# Patient Record
Sex: MALE | Race: OTHER | HISPANIC OR LATINO | Employment: OTHER | URBAN - METROPOLITAN AREA
[De-identification: names, ages, dates, MRNs, and addresses within clinical notes are randomized per-mention and may not be internally consistent; named-entity substitution may affect disease eponyms.]

---

## 2020-05-01 ENCOUNTER — HOSPITAL ENCOUNTER (INPATIENT)
Facility: HOSPITAL | Age: 70
LOS: 7 days | Discharge: HOME HEALTH | DRG: 871 | End: 2020-05-09
Attending: EMERGENCY MEDICINE | Admitting: FAMILY MEDICINE
Payer: MEDICARE

## 2020-05-01 DIAGNOSIS — R79.89 ELEVATED D-DIMER: ICD-10-CM

## 2020-05-01 DIAGNOSIS — J96.01 ACUTE HYPOXEMIC RESPIRATORY FAILURE (HCC): Primary | ICD-10-CM

## 2020-05-01 DIAGNOSIS — E87.1 HYPONATREMIA: ICD-10-CM

## 2020-05-01 DIAGNOSIS — D72.829 LEUKOCYTOSIS: ICD-10-CM

## 2020-05-01 DIAGNOSIS — K29.70 GASTRITIS: ICD-10-CM

## 2020-05-01 DIAGNOSIS — J12.82 PNEUMONIA DUE TO COVID-19 VIRUS: ICD-10-CM

## 2020-05-01 DIAGNOSIS — U07.1 PNEUMONIA DUE TO COVID-19 VIRUS: ICD-10-CM

## 2020-05-01 DIAGNOSIS — R94.31 PROLONGED Q-T INTERVAL ON ECG: ICD-10-CM

## 2020-05-01 DIAGNOSIS — I15.9 SECONDARY HYPERTENSION: ICD-10-CM

## 2020-05-01 DIAGNOSIS — J96.01 ACUTE RESPIRATORY FAILURE WITH HYPOXIA (HCC): ICD-10-CM

## 2020-05-01 DIAGNOSIS — R06.02 SHORTNESS OF BREATH: ICD-10-CM

## 2020-05-01 DIAGNOSIS — R79.82 ELEVATED C-REACTIVE PROTEIN (CRP): ICD-10-CM

## 2020-05-01 DIAGNOSIS — E87.2 LACTIC ACIDOSIS: ICD-10-CM

## 2020-05-01 DIAGNOSIS — E87.6 HYPOKALEMIA: ICD-10-CM

## 2020-05-01 PROCEDURE — 84145 PROCALCITONIN (PCT): CPT | Performed by: EMERGENCY MEDICINE

## 2020-05-01 PROCEDURE — 82550 ASSAY OF CK (CPK): CPT | Performed by: EMERGENCY MEDICINE

## 2020-05-01 PROCEDURE — 36415 COLL VENOUS BLD VENIPUNCTURE: CPT | Performed by: EMERGENCY MEDICINE

## 2020-05-01 PROCEDURE — 99285 EMERGENCY DEPT VISIT HI MDM: CPT

## 2020-05-01 PROCEDURE — 85007 BL SMEAR W/DIFF WBC COUNT: CPT | Performed by: EMERGENCY MEDICINE

## 2020-05-01 PROCEDURE — 87040 BLOOD CULTURE FOR BACTERIA: CPT | Performed by: EMERGENCY MEDICINE

## 2020-05-01 PROCEDURE — 85027 COMPLETE CBC AUTOMATED: CPT | Performed by: EMERGENCY MEDICINE

## 2020-05-01 PROCEDURE — 80053 COMPREHEN METABOLIC PANEL: CPT | Performed by: EMERGENCY MEDICINE

## 2020-05-01 PROCEDURE — 83520 IMMUNOASSAY QUANT NOS NONAB: CPT | Performed by: EMERGENCY MEDICINE

## 2020-05-01 PROCEDURE — 93005 ELECTROCARDIOGRAM TRACING: CPT

## 2020-05-01 PROCEDURE — 83605 ASSAY OF LACTIC ACID: CPT | Performed by: EMERGENCY MEDICINE

## 2020-05-01 PROCEDURE — 83735 ASSAY OF MAGNESIUM: CPT | Performed by: EMERGENCY MEDICINE

## 2020-05-01 PROCEDURE — 86140 C-REACTIVE PROTEIN: CPT | Performed by: EMERGENCY MEDICINE

## 2020-05-01 PROCEDURE — 85379 FIBRIN DEGRADATION QUANT: CPT | Performed by: EMERGENCY MEDICINE

## 2020-05-01 PROCEDURE — 82728 ASSAY OF FERRITIN: CPT | Performed by: EMERGENCY MEDICINE

## 2020-05-01 PROCEDURE — 83880 ASSAY OF NATRIURETIC PEPTIDE: CPT | Performed by: EMERGENCY MEDICINE

## 2020-05-01 PROCEDURE — 84484 ASSAY OF TROPONIN QUANT: CPT | Performed by: EMERGENCY MEDICINE

## 2020-05-01 PROCEDURE — 85610 PROTHROMBIN TIME: CPT | Performed by: EMERGENCY MEDICINE

## 2020-05-02 ENCOUNTER — APPOINTMENT (INPATIENT)
Dept: CT IMAGING | Facility: HOSPITAL | Age: 70
DRG: 871 | End: 2020-05-02
Payer: MEDICARE

## 2020-05-02 ENCOUNTER — APPOINTMENT (EMERGENCY)
Dept: RADIOLOGY | Facility: HOSPITAL | Age: 70
DRG: 871 | End: 2020-05-02
Attending: RADIOLOGY
Payer: MEDICARE

## 2020-05-02 PROBLEM — R06.02 SHORTNESS OF BREATH: Status: ACTIVE | Noted: 2020-05-02

## 2020-05-02 PROBLEM — Z20.822 SUSPECTED COVID-19 VIRUS INFECTION: Status: ACTIVE | Noted: 2020-05-02

## 2020-05-02 PROBLEM — J96.00 ACUTE RESPIRATORY FAILURE (HCC): Status: ACTIVE | Noted: 2020-05-02

## 2020-05-02 PROBLEM — A41.9 SEPSIS (HCC): Status: ACTIVE | Noted: 2020-05-02

## 2020-05-02 PROBLEM — I10 HYPERTENSION: Status: ACTIVE | Noted: 2020-05-02

## 2020-05-02 PROBLEM — R94.31 PROLONGED Q-T INTERVAL ON ECG: Status: ACTIVE | Noted: 2020-05-02

## 2020-05-02 LAB
ALBUMIN SERPL BCP-MCNC: 2.4 G/DL (ref 3.5–5)
ALBUMIN SERPL BCP-MCNC: 3 G/DL (ref 3.5–5)
ALP SERPL-CCNC: 72 U/L (ref 46–116)
ALP SERPL-CCNC: 76 U/L (ref 46–116)
ALT SERPL W P-5'-P-CCNC: 20 U/L (ref 12–78)
ALT SERPL W P-5'-P-CCNC: 26 U/L (ref 12–78)
ANION GAP SERPL CALCULATED.3IONS-SCNC: 10 MMOL/L (ref 4–13)
ANION GAP SERPL CALCULATED.3IONS-SCNC: 11 MMOL/L (ref 4–13)
AST SERPL W P-5'-P-CCNC: 35 U/L (ref 5–45)
AST SERPL W P-5'-P-CCNC: 38 U/L (ref 5–45)
ATRIAL RATE: 80 BPM
BASOPHILS # BLD MANUAL: 0 THOUSAND/UL (ref 0–0.1)
BASOPHILS NFR MAR MANUAL: 0 % (ref 0–1)
BILIRUB SERPL-MCNC: 0.5 MG/DL (ref 0.2–1)
BILIRUB SERPL-MCNC: 0.7 MG/DL (ref 0.2–1)
BUN SERPL-MCNC: 19 MG/DL (ref 5–25)
BUN SERPL-MCNC: 21 MG/DL (ref 5–25)
CALCIUM SERPL-MCNC: 8.7 MG/DL (ref 8.3–10.1)
CALCIUM SERPL-MCNC: 9.4 MG/DL (ref 8.3–10.1)
CHLORIDE SERPL-SCNC: 97 MMOL/L (ref 100–108)
CHLORIDE SERPL-SCNC: 99 MMOL/L (ref 100–108)
CK SERPL-CCNC: 149 U/L (ref 39–308)
CO2 SERPL-SCNC: 25 MMOL/L (ref 21–32)
CO2 SERPL-SCNC: 27 MMOL/L (ref 21–32)
CREAT SERPL-MCNC: 1.02 MG/DL (ref 0.6–1.3)
CREAT SERPL-MCNC: 1.27 MG/DL (ref 0.6–1.3)
CRP SERPL QL: 132.1 MG/L
D DIMER PPP FEU-MCNC: 2.29 UG/ML FEU
EOSINOPHIL # BLD MANUAL: 0 THOUSAND/UL (ref 0–0.4)
EOSINOPHIL NFR BLD MANUAL: 0 % (ref 0–6)
ERYTHROCYTE [DISTWIDTH] IN BLOOD BY AUTOMATED COUNT: 13.6 % (ref 11.6–15.1)
FERRITIN SERPL-MCNC: 744 NG/ML (ref 8–388)
GFR SERPL CREATININE-BSD FRML MDRD: 56 ML/MIN/1.73SQ M
GFR SERPL CREATININE-BSD FRML MDRD: 74 ML/MIN/1.73SQ M
GLUCOSE SERPL-MCNC: 125 MG/DL (ref 65–140)
GLUCOSE SERPL-MCNC: 99 MG/DL (ref 65–140)
HCT VFR BLD AUTO: 44.5 % (ref 36.5–49.3)
HGB BLD-MCNC: 14.6 G/DL (ref 12–17)
INR PPP: 0.95 (ref 0.84–1.19)
L PNEUMO1 AG UR QL IA.RAPID: NEGATIVE
LACTATE SERPL-SCNC: 1.8 MMOL/L (ref 0.5–2)
LACTATE SERPL-SCNC: 2.2 MMOL/L (ref 0.5–2)
LG PLATELETS BLD QL SMEAR: PRESENT
LYMPHOCYTES # BLD AUTO: 0.87 THOUSAND/UL (ref 0.6–4.47)
LYMPHOCYTES # BLD AUTO: 7 % (ref 14–44)
MAGNESIUM SERPL-MCNC: 2.4 MG/DL (ref 1.6–2.6)
MCH RBC QN AUTO: 30.5 PG (ref 26.8–34.3)
MCHC RBC AUTO-ENTMCNC: 32.8 G/DL (ref 31.4–37.4)
MCV RBC AUTO: 93 FL (ref 82–98)
MONOCYTES # BLD AUTO: 1.25 THOUSAND/UL (ref 0–1.22)
MONOCYTES NFR BLD: 10 % (ref 4–12)
NEUTROPHILS # BLD MANUAL: 9.98 THOUSAND/UL (ref 1.85–7.62)
NEUTS SEG NFR BLD AUTO: 80 % (ref 43–75)
NRBC BLD AUTO-RTO: 0 /100 WBCS
NT-PROBNP SERPL-MCNC: 151 PG/ML
P AXIS: 63 DEGREES
PLATELET # BLD AUTO: 190 THOUSANDS/UL (ref 149–390)
PLATELET # BLD AUTO: 202 THOUSANDS/UL (ref 149–390)
PLATELET BLD QL SMEAR: ADEQUATE
PMV BLD AUTO: 13.1 FL (ref 8.9–12.7)
PMV BLD AUTO: 13.3 FL (ref 8.9–12.7)
POTASSIUM SERPL-SCNC: 3.2 MMOL/L (ref 3.5–5.3)
POTASSIUM SERPL-SCNC: 3.3 MMOL/L (ref 3.5–5.3)
PR INTERVAL: 146 MS
PROCALCITONIN SERPL-MCNC: 0.15 NG/ML
PROCALCITONIN SERPL-MCNC: 0.21 NG/ML
PROT SERPL-MCNC: 7.5 G/DL (ref 6.4–8.2)
PROT SERPL-MCNC: 8.6 G/DL (ref 6.4–8.2)
PROTHROMBIN TIME: 12.6 SECONDS (ref 11.6–14.5)
QRS AXIS: 16 DEGREES
QRSD INTERVAL: 92 MS
QT INTERVAL: 410 MS
QTC INTERVAL: 472 MS
RBC # BLD AUTO: 4.78 MILLION/UL (ref 3.88–5.62)
S PNEUM AG UR QL: NEGATIVE
SARS-COV-2 RNA RESP QL NAA+PROBE: NEGATIVE
SODIUM SERPL-SCNC: 134 MMOL/L (ref 136–145)
SODIUM SERPL-SCNC: 135 MMOL/L (ref 136–145)
T WAVE AXIS: 50 DEGREES
TOTAL CELLS COUNTED SPEC: 100
TROPONIN I SERPL-MCNC: <0.02 NG/ML
VARIANT LYMPHS # BLD AUTO: 3 %
VENTRICULAR RATE: 80 BPM
WBC # BLD AUTO: 12.47 THOUSAND/UL (ref 4.31–10.16)

## 2020-05-02 PROCEDURE — 83605 ASSAY OF LACTIC ACID: CPT | Performed by: PHYSICIAN ASSISTANT

## 2020-05-02 PROCEDURE — 93010 ELECTROCARDIOGRAM REPORT: CPT | Performed by: INTERNAL MEDICINE

## 2020-05-02 PROCEDURE — 96374 THER/PROPH/DIAG INJ IV PUSH: CPT

## 2020-05-02 PROCEDURE — 80053 COMPREHEN METABOLIC PANEL: CPT | Performed by: PHYSICIAN ASSISTANT

## 2020-05-02 PROCEDURE — 99223 1ST HOSP IP/OBS HIGH 75: CPT | Performed by: FAMILY MEDICINE

## 2020-05-02 PROCEDURE — 87449 NOS EACH ORGANISM AG IA: CPT | Performed by: PHYSICIAN ASSISTANT

## 2020-05-02 PROCEDURE — 71275 CT ANGIOGRAPHY CHEST: CPT

## 2020-05-02 PROCEDURE — 85049 AUTOMATED PLATELET COUNT: CPT | Performed by: FAMILY MEDICINE

## 2020-05-02 PROCEDURE — 99222 1ST HOSP IP/OBS MODERATE 55: CPT | Performed by: INTERNAL MEDICINE

## 2020-05-02 PROCEDURE — 87205 SMEAR GRAM STAIN: CPT | Performed by: PHYSICIAN ASSISTANT

## 2020-05-02 PROCEDURE — 87070 CULTURE OTHR SPECIMN AEROBIC: CPT | Performed by: PHYSICIAN ASSISTANT

## 2020-05-02 PROCEDURE — 99291 CRITICAL CARE FIRST HOUR: CPT | Performed by: EMERGENCY MEDICINE

## 2020-05-02 PROCEDURE — 87635 SARS-COV-2 COVID-19 AMP PRB: CPT | Performed by: EMERGENCY MEDICINE

## 2020-05-02 PROCEDURE — 84145 PROCALCITONIN (PCT): CPT | Performed by: FAMILY MEDICINE

## 2020-05-02 PROCEDURE — 93005 ELECTROCARDIOGRAM TRACING: CPT

## 2020-05-02 RX ORDER — SIMVASTATIN 10 MG
20 TABLET ORAL
COMMUNITY
End: 2020-06-10 | Stop reason: SDUPTHER

## 2020-05-02 RX ORDER — ASPIRIN 81 MG/1
81 TABLET, CHEWABLE ORAL DAILY
Status: CANCELLED | OUTPATIENT
Start: 2020-05-02

## 2020-05-02 RX ORDER — HYDROXYCHLOROQUINE SULFATE 200 MG/1
200 TABLET, FILM COATED ORAL EVERY 12 HOURS
Status: COMPLETED | OUTPATIENT
Start: 2020-05-03 | End: 2020-05-08

## 2020-05-02 RX ORDER — GUAIFENESIN 600 MG
600 TABLET, EXTENDED RELEASE 12 HR ORAL 2 TIMES DAILY
Status: DISCONTINUED | OUTPATIENT
Start: 2020-05-02 | End: 2020-05-09 | Stop reason: HOSPADM

## 2020-05-02 RX ORDER — HYDROXYCHLOROQUINE SULFATE 200 MG/1
800 TABLET, FILM COATED ORAL EVERY 24 HOURS
Status: COMPLETED | OUTPATIENT
Start: 2020-05-02 | End: 2020-05-02

## 2020-05-02 RX ORDER — POTASSIUM CHLORIDE 20 MEQ/1
40 TABLET, EXTENDED RELEASE ORAL ONCE
Status: COMPLETED | OUTPATIENT
Start: 2020-05-02 | End: 2020-05-02

## 2020-05-02 RX ORDER — ALLOPURINOL 300 MG/1
300 TABLET ORAL DAILY
Status: CANCELLED | OUTPATIENT
Start: 2020-05-02

## 2020-05-02 RX ORDER — ATORVASTATIN CALCIUM 40 MG/1
40 TABLET, FILM COATED ORAL
Status: DISCONTINUED | OUTPATIENT
Start: 2020-05-02 | End: 2020-05-09 | Stop reason: HOSPADM

## 2020-05-02 RX ORDER — ASPIRIN 81 MG/1
81 TABLET, CHEWABLE ORAL DAILY
Status: DISCONTINUED | OUTPATIENT
Start: 2020-05-02 | End: 2020-05-09 | Stop reason: HOSPADM

## 2020-05-02 RX ORDER — ZINC SULFATE 50(220)MG
220 CAPSULE ORAL DAILY
Status: COMPLETED | OUTPATIENT
Start: 2020-05-02 | End: 2020-05-08

## 2020-05-02 RX ORDER — ONDANSETRON 2 MG/ML
4 INJECTION INTRAMUSCULAR; INTRAVENOUS EVERY 6 HOURS PRN
Status: DISCONTINUED | OUTPATIENT
Start: 2020-05-02 | End: 2020-05-09 | Stop reason: HOSPADM

## 2020-05-02 RX ORDER — METOPROLOL SUCCINATE 50 MG/1
100 TABLET, EXTENDED RELEASE ORAL DAILY
COMMUNITY
End: 2020-06-19 | Stop reason: SDUPTHER

## 2020-05-02 RX ORDER — ALLOPURINOL 300 MG/1
300 TABLET ORAL DAILY
Status: DISCONTINUED | OUTPATIENT
Start: 2020-05-02 | End: 2020-05-09 | Stop reason: HOSPADM

## 2020-05-02 RX ORDER — INDOMETHACIN 25 MG/1
50 CAPSULE ORAL 2 TIMES DAILY WITH MEALS
COMMUNITY
End: 2020-05-09 | Stop reason: HOSPADM

## 2020-05-02 RX ORDER — METHYLPREDNISOLONE SODIUM SUCCINATE 125 MG/2ML
45 INJECTION, POWDER, LYOPHILIZED, FOR SOLUTION INTRAMUSCULAR; INTRAVENOUS DAILY
Status: DISCONTINUED | OUTPATIENT
Start: 2020-05-02 | End: 2020-05-02

## 2020-05-02 RX ORDER — MELATONIN
2000 DAILY
Status: DISCONTINUED | OUTPATIENT
Start: 2020-05-02 | End: 2020-05-09 | Stop reason: HOSPADM

## 2020-05-02 RX ORDER — ASCORBIC ACID 500 MG
1000 TABLET ORAL EVERY 12 HOURS SCHEDULED
Status: COMPLETED | OUTPATIENT
Start: 2020-05-02 | End: 2020-05-08

## 2020-05-02 RX ORDER — INDOMETHACIN 25 MG/1
50 CAPSULE ORAL 2 TIMES DAILY WITH MEALS
Status: CANCELLED | OUTPATIENT
Start: 2020-05-02

## 2020-05-02 RX ORDER — METHYLPREDNISOLONE SODIUM SUCCINATE 125 MG/2ML
50 INJECTION, POWDER, LYOPHILIZED, FOR SOLUTION INTRAMUSCULAR; INTRAVENOUS DAILY
Status: DISCONTINUED | OUTPATIENT
Start: 2020-05-02 | End: 2020-05-03

## 2020-05-02 RX ORDER — MULTIVITAMIN/IRON/FOLIC ACID 18MG-0.4MG
1 TABLET ORAL DAILY
Status: DISCONTINUED | OUTPATIENT
Start: 2020-05-09 | End: 2020-05-09 | Stop reason: HOSPADM

## 2020-05-02 RX ORDER — DOXYCYCLINE HYCLATE 100 MG/1
100 CAPSULE ORAL EVERY 12 HOURS
Status: DISCONTINUED | OUTPATIENT
Start: 2020-05-02 | End: 2020-05-03

## 2020-05-02 RX ORDER — METOPROLOL SUCCINATE 100 MG/1
100 TABLET, EXTENDED RELEASE ORAL DAILY
Status: DISCONTINUED | OUTPATIENT
Start: 2020-05-02 | End: 2020-05-09 | Stop reason: HOSPADM

## 2020-05-02 RX ORDER — ACETAMINOPHEN 325 MG/1
650 TABLET ORAL EVERY 6 HOURS PRN
Status: DISCONTINUED | OUTPATIENT
Start: 2020-05-02 | End: 2020-05-09 | Stop reason: HOSPADM

## 2020-05-02 RX ORDER — DOCUSATE SODIUM 100 MG/1
100 CAPSULE, LIQUID FILLED ORAL 2 TIMES DAILY
Status: DISCONTINUED | OUTPATIENT
Start: 2020-05-02 | End: 2020-05-09 | Stop reason: HOSPADM

## 2020-05-02 RX ORDER — OXYBUTYNIN CHLORIDE 5 MG/1
TABLET ORAL 3 TIMES DAILY
COMMUNITY
End: 2020-05-02

## 2020-05-02 RX ORDER — ALLOPURINOL 100 MG/1
300 TABLET ORAL DAILY
Status: ON HOLD | COMMUNITY

## 2020-05-02 RX ORDER — DOXYCYCLINE HYCLATE 100 MG/1
100 CAPSULE ORAL ONCE
Status: COMPLETED | OUTPATIENT
Start: 2020-05-02 | End: 2020-05-02

## 2020-05-02 RX ORDER — ASPIRIN 81 MG/1
81 TABLET, CHEWABLE ORAL DAILY
Status: ON HOLD | COMMUNITY

## 2020-05-02 RX ADMIN — POTASSIUM CHLORIDE 40 MEQ: 1500 TABLET, EXTENDED RELEASE ORAL at 13:05

## 2020-05-02 RX ADMIN — METOPROLOL SUCCINATE 100 MG: 100 TABLET, EXTENDED RELEASE ORAL at 09:56

## 2020-05-02 RX ADMIN — OXYCODONE HYDROCHLORIDE AND ACETAMINOPHEN 1000 MG: 500 TABLET ORAL at 21:06

## 2020-05-02 RX ADMIN — GUAIFENESIN 600 MG: 600 TABLET ORAL at 17:31

## 2020-05-02 RX ADMIN — SODIUM CHLORIDE 1000 ML: 0.9 INJECTION, SOLUTION INTRAVENOUS at 00:52

## 2020-05-02 RX ADMIN — DOCUSATE SODIUM 100 MG: 100 CAPSULE, LIQUID FILLED ORAL at 09:57

## 2020-05-02 RX ADMIN — GUAIFENESIN 600 MG: 600 TABLET ORAL at 09:56

## 2020-05-02 RX ADMIN — ZINC SULFATE 220 MG (50 MG) CAPSULE 220 MG: CAPSULE at 09:56

## 2020-05-02 RX ADMIN — HYDROXYCHLOROQUINE SULFATE 800 MG: 200 TABLET, FILM COATED ORAL at 03:36

## 2020-05-02 RX ADMIN — OXYCODONE HYDROCHLORIDE AND ACETAMINOPHEN 1000 MG: 500 TABLET ORAL at 09:57

## 2020-05-02 RX ADMIN — DOXYCYCLINE 100 MG: 100 CAPSULE ORAL at 13:05

## 2020-05-02 RX ADMIN — ASPIRIN 81 MG 81 MG: 81 TABLET ORAL at 09:57

## 2020-05-02 RX ADMIN — MELATONIN 2000 UNITS: at 09:57

## 2020-05-02 RX ADMIN — ATORVASTATIN CALCIUM 40 MG: 40 TABLET, FILM COATED ORAL at 21:06

## 2020-05-02 RX ADMIN — DOXYCYCLINE 100 MG: 100 CAPSULE ORAL at 01:14

## 2020-05-02 RX ADMIN — POTASSIUM CHLORIDE 40 MEQ: 1500 TABLET, EXTENDED RELEASE ORAL at 03:36

## 2020-05-02 RX ADMIN — ALLOPURINOL 300 MG: 300 TABLET ORAL at 09:56

## 2020-05-02 RX ADMIN — METHYLPREDNISOLONE SODIUM SUCCINATE 50 MG: 125 INJECTION, POWDER, FOR SOLUTION INTRAMUSCULAR; INTRAVENOUS at 09:56

## 2020-05-02 RX ADMIN — DOCUSATE SODIUM 100 MG: 100 CAPSULE, LIQUID FILLED ORAL at 17:31

## 2020-05-02 RX ADMIN — ENOXAPARIN SODIUM 40 MG: 40 INJECTION SUBCUTANEOUS at 09:56

## 2020-05-02 RX ADMIN — IOHEXOL 90 ML: 350 INJECTION, SOLUTION INTRAVENOUS at 03:32

## 2020-05-02 RX ADMIN — CEFTRIAXONE SODIUM 1000 MG: 10 INJECTION, POWDER, FOR SOLUTION INTRAVENOUS at 01:14

## 2020-05-03 LAB
ALBUMIN SERPL BCP-MCNC: 2.3 G/DL (ref 3.5–5)
ALP SERPL-CCNC: 67 U/L (ref 46–116)
ALT SERPL W P-5'-P-CCNC: 22 U/L (ref 12–78)
ANION GAP SERPL CALCULATED.3IONS-SCNC: 9 MMOL/L (ref 4–13)
AST SERPL W P-5'-P-CCNC: 21 U/L (ref 5–45)
ATRIAL RATE: 69 BPM
ATRIAL RATE: 71 BPM
BASOPHILS # BLD AUTO: 0.01 THOUSANDS/ΜL (ref 0–0.1)
BASOPHILS NFR BLD AUTO: 0 % (ref 0–1)
BILIRUB SERPL-MCNC: 0.5 MG/DL (ref 0.2–1)
BUN SERPL-MCNC: 13 MG/DL (ref 5–25)
CALCIUM SERPL-MCNC: 9 MG/DL (ref 8.3–10.1)
CHLORIDE SERPL-SCNC: 103 MMOL/L (ref 100–108)
CO2 SERPL-SCNC: 25 MMOL/L (ref 21–32)
CREAT SERPL-MCNC: 0.72 MG/DL (ref 0.6–1.3)
CRP SERPL QL: 114.8 MG/L
D DIMER PPP FEU-MCNC: 1.27 UG/ML FEU
EOSINOPHIL # BLD AUTO: 0 THOUSAND/ΜL (ref 0–0.61)
EOSINOPHIL NFR BLD AUTO: 0 % (ref 0–6)
ERYTHROCYTE [DISTWIDTH] IN BLOOD BY AUTOMATED COUNT: 13.6 % (ref 11.6–15.1)
FERRITIN SERPL-MCNC: 626 NG/ML (ref 8–388)
GFR SERPL CREATININE-BSD FRML MDRD: 94 ML/MIN/1.73SQ M
GLUCOSE SERPL-MCNC: 129 MG/DL (ref 65–140)
HCT VFR BLD AUTO: 39.5 % (ref 36.5–49.3)
HGB BLD-MCNC: 13.3 G/DL (ref 12–17)
IMM GRANULOCYTES # BLD AUTO: 0.16 THOUSAND/UL (ref 0–0.2)
IMM GRANULOCYTES NFR BLD AUTO: 1 % (ref 0–2)
LYMPHOCYTES # BLD AUTO: 1.08 THOUSANDS/ΜL (ref 0.6–4.47)
LYMPHOCYTES NFR BLD AUTO: 9 % (ref 14–44)
MCH RBC QN AUTO: 30.9 PG (ref 26.8–34.3)
MCHC RBC AUTO-ENTMCNC: 33.7 G/DL (ref 31.4–37.4)
MCV RBC AUTO: 92 FL (ref 82–98)
MONOCYTES # BLD AUTO: 0.89 THOUSAND/ΜL (ref 0.17–1.22)
MONOCYTES NFR BLD AUTO: 8 % (ref 4–12)
NEUTROPHILS # BLD AUTO: 9.45 THOUSANDS/ΜL (ref 1.85–7.62)
NEUTS SEG NFR BLD AUTO: 82 % (ref 43–75)
NRBC BLD AUTO-RTO: 0 /100 WBCS
NT-PROBNP SERPL-MCNC: 360 PG/ML
P AXIS: 58 DEGREES
P AXIS: 68 DEGREES
PLATELET # BLD AUTO: 226 THOUSANDS/UL (ref 149–390)
PMV BLD AUTO: 12.9 FL (ref 8.9–12.7)
POTASSIUM SERPL-SCNC: 4.1 MMOL/L (ref 3.5–5.3)
PR INTERVAL: 144 MS
PR INTERVAL: 144 MS
PROCALCITONIN SERPL-MCNC: 0.05 NG/ML
PROT SERPL-MCNC: 7.6 G/DL (ref 6.4–8.2)
QRS AXIS: 15 DEGREES
QRS AXIS: 16 DEGREES
QRSD INTERVAL: 102 MS
QRSD INTERVAL: 94 MS
QT INTERVAL: 432 MS
QT INTERVAL: 434 MS
QTC INTERVAL: 462 MS
QTC INTERVAL: 471 MS
RBC # BLD AUTO: 4.3 MILLION/UL (ref 3.88–5.62)
SARS-COV-2 RNA RESP QL NAA+PROBE: NEGATIVE
SODIUM SERPL-SCNC: 137 MMOL/L (ref 136–145)
T WAVE AXIS: 51 DEGREES
T WAVE AXIS: 55 DEGREES
TROPONIN I SERPL-MCNC: <0.02 NG/ML
VENTRICULAR RATE: 69 BPM
VENTRICULAR RATE: 71 BPM
WBC # BLD AUTO: 11.59 THOUSAND/UL (ref 4.31–10.16)

## 2020-05-03 PROCEDURE — 82728 ASSAY OF FERRITIN: CPT | Performed by: PHYSICIAN ASSISTANT

## 2020-05-03 PROCEDURE — 87635 SARS-COV-2 COVID-19 AMP PRB: CPT | Performed by: PHYSICIAN ASSISTANT

## 2020-05-03 PROCEDURE — 84145 PROCALCITONIN (PCT): CPT | Performed by: FAMILY MEDICINE

## 2020-05-03 PROCEDURE — 80053 COMPREHEN METABOLIC PANEL: CPT | Performed by: PHYSICIAN ASSISTANT

## 2020-05-03 PROCEDURE — 93010 ELECTROCARDIOGRAM REPORT: CPT | Performed by: INTERNAL MEDICINE

## 2020-05-03 PROCEDURE — 86140 C-REACTIVE PROTEIN: CPT | Performed by: PHYSICIAN ASSISTANT

## 2020-05-03 PROCEDURE — 83880 ASSAY OF NATRIURETIC PEPTIDE: CPT | Performed by: PHYSICIAN ASSISTANT

## 2020-05-03 PROCEDURE — 85025 COMPLETE CBC W/AUTO DIFF WBC: CPT | Performed by: PHYSICIAN ASSISTANT

## 2020-05-03 PROCEDURE — 93005 ELECTROCARDIOGRAM TRACING: CPT

## 2020-05-03 PROCEDURE — 84484 ASSAY OF TROPONIN QUANT: CPT | Performed by: PHYSICIAN ASSISTANT

## 2020-05-03 PROCEDURE — 99232 SBSQ HOSP IP/OBS MODERATE 35: CPT | Performed by: INTERNAL MEDICINE

## 2020-05-03 PROCEDURE — 99232 SBSQ HOSP IP/OBS MODERATE 35: CPT | Performed by: FAMILY MEDICINE

## 2020-05-03 PROCEDURE — 85379 FIBRIN DEGRADATION QUANT: CPT | Performed by: PHYSICIAN ASSISTANT

## 2020-05-03 RX ORDER — METHYLPREDNISOLONE SODIUM SUCCINATE 40 MG/ML
40 INJECTION, POWDER, LYOPHILIZED, FOR SOLUTION INTRAMUSCULAR; INTRAVENOUS EVERY 12 HOURS SCHEDULED
Status: DISCONTINUED | OUTPATIENT
Start: 2020-05-03 | End: 2020-05-07

## 2020-05-03 RX ADMIN — MELATONIN 2000 UNITS: at 09:36

## 2020-05-03 RX ADMIN — DOCUSATE SODIUM 100 MG: 100 CAPSULE, LIQUID FILLED ORAL at 09:36

## 2020-05-03 RX ADMIN — OXYCODONE HYDROCHLORIDE AND ACETAMINOPHEN 1000 MG: 500 TABLET ORAL at 21:19

## 2020-05-03 RX ADMIN — ASPIRIN 81 MG 81 MG: 81 TABLET ORAL at 09:36

## 2020-05-03 RX ADMIN — ZINC SULFATE 220 MG (50 MG) CAPSULE 220 MG: CAPSULE at 09:36

## 2020-05-03 RX ADMIN — CEFTRIAXONE SODIUM 1000 MG: 10 INJECTION, POWDER, FOR SOLUTION INTRAVENOUS at 01:02

## 2020-05-03 RX ADMIN — METOPROLOL SUCCINATE 100 MG: 100 TABLET, EXTENDED RELEASE ORAL at 09:36

## 2020-05-03 RX ADMIN — DOXYCYCLINE 100 MG: 100 CAPSULE ORAL at 01:01

## 2020-05-03 RX ADMIN — HYDROXYCHLOROQUINE SULFATE 200 MG: 200 TABLET, FILM COATED ORAL at 16:00

## 2020-05-03 RX ADMIN — METHYLPREDNISOLONE SODIUM SUCCINATE 50 MG: 125 INJECTION, POWDER, FOR SOLUTION INTRAMUSCULAR; INTRAVENOUS at 09:35

## 2020-05-03 RX ADMIN — ALLOPURINOL 300 MG: 300 TABLET ORAL at 09:36

## 2020-05-03 RX ADMIN — OXYCODONE HYDROCHLORIDE AND ACETAMINOPHEN 1000 MG: 500 TABLET ORAL at 09:36

## 2020-05-03 RX ADMIN — ATORVASTATIN CALCIUM 40 MG: 40 TABLET, FILM COATED ORAL at 21:19

## 2020-05-03 RX ADMIN — ENOXAPARIN SODIUM 40 MG: 40 INJECTION SUBCUTANEOUS at 09:35

## 2020-05-03 RX ADMIN — GUAIFENESIN 600 MG: 600 TABLET ORAL at 09:36

## 2020-05-03 RX ADMIN — HYDROXYCHLOROQUINE SULFATE 200 MG: 200 TABLET, FILM COATED ORAL at 02:05

## 2020-05-03 RX ADMIN — GUAIFENESIN 600 MG: 600 TABLET ORAL at 17:18

## 2020-05-03 RX ADMIN — METHYLPREDNISOLONE SODIUM SUCCINATE 40 MG: 40 INJECTION, POWDER, FOR SOLUTION INTRAMUSCULAR; INTRAVENOUS at 21:19

## 2020-05-03 RX ADMIN — DOCUSATE SODIUM 100 MG: 100 CAPSULE, LIQUID FILLED ORAL at 17:18

## 2020-05-04 LAB
ATRIAL RATE: 65 BPM
BACTERIA SPT RESP CULT: ABNORMAL
BACTERIA SPT RESP CULT: ABNORMAL
CRP SERPL QL: 46.1 MG/L
ERYTHROCYTE [DISTWIDTH] IN BLOOD BY AUTOMATED COUNT: 13.6 % (ref 11.6–15.1)
GRAM STN SPEC: ABNORMAL
HCT VFR BLD AUTO: 39 % (ref 36.5–49.3)
HGB BLD-MCNC: 13.1 G/DL (ref 12–17)
IL6 SERPL-MCNC: 87.1 PG/ML (ref 0–15.5)
MCH RBC QN AUTO: 30.8 PG (ref 26.8–34.3)
MCHC RBC AUTO-ENTMCNC: 33.6 G/DL (ref 31.4–37.4)
MCV RBC AUTO: 92 FL (ref 82–98)
P AXIS: 70 DEGREES
PLATELET # BLD AUTO: 273 THOUSANDS/UL (ref 149–390)
PMV BLD AUTO: 12.9 FL (ref 8.9–12.7)
PR INTERVAL: 152 MS
QRS AXIS: 26 DEGREES
QRSD INTERVAL: 96 MS
QT INTERVAL: 452 MS
QTC INTERVAL: 470 MS
RBC # BLD AUTO: 4.26 MILLION/UL (ref 3.88–5.62)
T WAVE AXIS: 32 DEGREES
VENTRICULAR RATE: 65 BPM
WBC # BLD AUTO: 12.26 THOUSAND/UL (ref 4.31–10.16)

## 2020-05-04 PROCEDURE — 99223 1ST HOSP IP/OBS HIGH 75: CPT | Performed by: INTERNAL MEDICINE

## 2020-05-04 PROCEDURE — 93010 ELECTROCARDIOGRAM REPORT: CPT | Performed by: INTERNAL MEDICINE

## 2020-05-04 PROCEDURE — 97163 PT EVAL HIGH COMPLEX 45 MIN: CPT

## 2020-05-04 PROCEDURE — 99232 SBSQ HOSP IP/OBS MODERATE 35: CPT | Performed by: INTERNAL MEDICINE

## 2020-05-04 PROCEDURE — 99232 SBSQ HOSP IP/OBS MODERATE 35: CPT | Performed by: FAMILY MEDICINE

## 2020-05-04 PROCEDURE — 85027 COMPLETE CBC AUTOMATED: CPT | Performed by: FAMILY MEDICINE

## 2020-05-04 PROCEDURE — 93005 ELECTROCARDIOGRAM TRACING: CPT

## 2020-05-04 PROCEDURE — 86140 C-REACTIVE PROTEIN: CPT | Performed by: FAMILY MEDICINE

## 2020-05-04 RX ORDER — LISINOPRIL 5 MG/1
5 TABLET ORAL DAILY
Status: DISCONTINUED | OUTPATIENT
Start: 2020-05-04 | End: 2020-05-07

## 2020-05-04 RX ADMIN — METOPROLOL SUCCINATE 100 MG: 100 TABLET, EXTENDED RELEASE ORAL at 09:25

## 2020-05-04 RX ADMIN — LISINOPRIL 5 MG: 5 TABLET ORAL at 17:28

## 2020-05-04 RX ADMIN — OXYCODONE HYDROCHLORIDE AND ACETAMINOPHEN 1000 MG: 500 TABLET ORAL at 20:37

## 2020-05-04 RX ADMIN — METHYLPREDNISOLONE SODIUM SUCCINATE 40 MG: 40 INJECTION, POWDER, FOR SOLUTION INTRAMUSCULAR; INTRAVENOUS at 09:25

## 2020-05-04 RX ADMIN — MELATONIN 2000 UNITS: at 09:26

## 2020-05-04 RX ADMIN — DOCUSATE SODIUM 100 MG: 100 CAPSULE, LIQUID FILLED ORAL at 09:26

## 2020-05-04 RX ADMIN — GUAIFENESIN 600 MG: 600 TABLET ORAL at 17:26

## 2020-05-04 RX ADMIN — OXYCODONE HYDROCHLORIDE AND ACETAMINOPHEN 1000 MG: 500 TABLET ORAL at 09:26

## 2020-05-04 RX ADMIN — ZINC SULFATE 220 MG (50 MG) CAPSULE 220 MG: CAPSULE at 09:26

## 2020-05-04 RX ADMIN — HYDROXYCHLOROQUINE SULFATE 200 MG: 200 TABLET, FILM COATED ORAL at 17:26

## 2020-05-04 RX ADMIN — ATORVASTATIN CALCIUM 40 MG: 40 TABLET, FILM COATED ORAL at 20:37

## 2020-05-04 RX ADMIN — HYDROXYCHLOROQUINE SULFATE 200 MG: 200 TABLET, FILM COATED ORAL at 03:11

## 2020-05-04 RX ADMIN — ENOXAPARIN SODIUM 40 MG: 40 INJECTION SUBCUTANEOUS at 09:25

## 2020-05-04 RX ADMIN — GUAIFENESIN 600 MG: 600 TABLET ORAL at 09:26

## 2020-05-04 RX ADMIN — METHYLPREDNISOLONE SODIUM SUCCINATE 40 MG: 40 INJECTION, POWDER, FOR SOLUTION INTRAMUSCULAR; INTRAVENOUS at 20:37

## 2020-05-04 RX ADMIN — DOCUSATE SODIUM 100 MG: 100 CAPSULE, LIQUID FILLED ORAL at 17:26

## 2020-05-04 RX ADMIN — ASPIRIN 81 MG 81 MG: 81 TABLET ORAL at 09:26

## 2020-05-04 RX ADMIN — ALLOPURINOL 300 MG: 300 TABLET ORAL at 09:26

## 2020-05-05 PROBLEM — A41.9 SEPSIS (HCC): Status: RESOLVED | Noted: 2020-05-02 | Resolved: 2020-05-05

## 2020-05-05 PROBLEM — J96.01 ACUTE RESPIRATORY FAILURE WITH HYPOXIA (HCC): Status: ACTIVE | Noted: 2020-05-02

## 2020-05-05 PROCEDURE — 99232 SBSQ HOSP IP/OBS MODERATE 35: CPT | Performed by: PHYSICIAN ASSISTANT

## 2020-05-05 PROCEDURE — 99233 SBSQ HOSP IP/OBS HIGH 50: CPT | Performed by: INTERNAL MEDICINE

## 2020-05-05 RX ADMIN — GUAIFENESIN 600 MG: 600 TABLET ORAL at 08:50

## 2020-05-05 RX ADMIN — METHYLPREDNISOLONE SODIUM SUCCINATE 40 MG: 40 INJECTION, POWDER, FOR SOLUTION INTRAMUSCULAR; INTRAVENOUS at 21:43

## 2020-05-05 RX ADMIN — ATORVASTATIN CALCIUM 40 MG: 40 TABLET, FILM COATED ORAL at 21:42

## 2020-05-05 RX ADMIN — DOCUSATE SODIUM 100 MG: 100 CAPSULE, LIQUID FILLED ORAL at 08:50

## 2020-05-05 RX ADMIN — ENOXAPARIN SODIUM 40 MG: 40 INJECTION SUBCUTANEOUS at 08:50

## 2020-05-05 RX ADMIN — ZINC SULFATE 220 MG (50 MG) CAPSULE 220 MG: CAPSULE at 08:49

## 2020-05-05 RX ADMIN — HYDROXYCHLOROQUINE SULFATE 200 MG: 200 TABLET, FILM COATED ORAL at 03:13

## 2020-05-05 RX ADMIN — MELATONIN 2000 UNITS: at 08:50

## 2020-05-05 RX ADMIN — HYDROXYCHLOROQUINE SULFATE 200 MG: 200 TABLET, FILM COATED ORAL at 15:55

## 2020-05-05 RX ADMIN — METOPROLOL SUCCINATE 100 MG: 100 TABLET, EXTENDED RELEASE ORAL at 08:50

## 2020-05-05 RX ADMIN — ASPIRIN 81 MG 81 MG: 81 TABLET ORAL at 08:50

## 2020-05-05 RX ADMIN — ENOXAPARIN SODIUM 90 MG: 100 INJECTION SUBCUTANEOUS at 21:41

## 2020-05-05 RX ADMIN — OXYCODONE HYDROCHLORIDE AND ACETAMINOPHEN 1000 MG: 500 TABLET ORAL at 21:41

## 2020-05-05 RX ADMIN — OXYCODONE HYDROCHLORIDE AND ACETAMINOPHEN 1000 MG: 500 TABLET ORAL at 08:49

## 2020-05-05 RX ADMIN — METHYLPREDNISOLONE SODIUM SUCCINATE 40 MG: 40 INJECTION, POWDER, FOR SOLUTION INTRAMUSCULAR; INTRAVENOUS at 08:50

## 2020-05-05 RX ADMIN — LISINOPRIL 5 MG: 5 TABLET ORAL at 08:50

## 2020-05-05 RX ADMIN — ALLOPURINOL 300 MG: 300 TABLET ORAL at 08:50

## 2020-05-06 LAB
ALBUMIN SERPL BCP-MCNC: 2.4 G/DL (ref 3.5–5)
ALP SERPL-CCNC: 63 U/L (ref 46–116)
ALT SERPL W P-5'-P-CCNC: 29 U/L (ref 12–78)
ANION GAP SERPL CALCULATED.3IONS-SCNC: 10 MMOL/L (ref 4–13)
AST SERPL W P-5'-P-CCNC: 18 U/L (ref 5–45)
BILIRUB SERPL-MCNC: 0.3 MG/DL (ref 0.2–1)
BUN SERPL-MCNC: 15 MG/DL (ref 5–25)
CALCIUM SERPL-MCNC: 9 MG/DL (ref 8.3–10.1)
CHLORIDE SERPL-SCNC: 106 MMOL/L (ref 100–108)
CO2 SERPL-SCNC: 25 MMOL/L (ref 21–32)
CREAT SERPL-MCNC: 0.7 MG/DL (ref 0.6–1.3)
D DIMER PPP FEU-MCNC: 0.67 UG/ML FEU
ERYTHROCYTE [DISTWIDTH] IN BLOOD BY AUTOMATED COUNT: 13.9 % (ref 11.6–15.1)
GFR SERPL CREATININE-BSD FRML MDRD: 95 ML/MIN/1.73SQ M
GLUCOSE SERPL-MCNC: 126 MG/DL (ref 65–140)
HCT VFR BLD AUTO: 43.6 % (ref 36.5–49.3)
HGB BLD-MCNC: 14.1 G/DL (ref 12–17)
MCH RBC QN AUTO: 30.1 PG (ref 26.8–34.3)
MCHC RBC AUTO-ENTMCNC: 32.3 G/DL (ref 31.4–37.4)
MCV RBC AUTO: 93 FL (ref 82–98)
PLATELET # BLD AUTO: 302 THOUSANDS/UL (ref 149–390)
PMV BLD AUTO: 12.5 FL (ref 8.9–12.7)
POTASSIUM SERPL-SCNC: 4.3 MMOL/L (ref 3.5–5.3)
PROT SERPL-MCNC: 7.2 G/DL (ref 6.4–8.2)
RBC # BLD AUTO: 4.69 MILLION/UL (ref 3.88–5.62)
SODIUM SERPL-SCNC: 141 MMOL/L (ref 136–145)
WBC # BLD AUTO: 12.54 THOUSAND/UL (ref 4.31–10.16)

## 2020-05-06 PROCEDURE — 99232 SBSQ HOSP IP/OBS MODERATE 35: CPT | Performed by: PHYSICIAN ASSISTANT

## 2020-05-06 PROCEDURE — 80053 COMPREHEN METABOLIC PANEL: CPT | Performed by: INTERNAL MEDICINE

## 2020-05-06 PROCEDURE — 99233 SBSQ HOSP IP/OBS HIGH 50: CPT | Performed by: INTERNAL MEDICINE

## 2020-05-06 PROCEDURE — 85027 COMPLETE CBC AUTOMATED: CPT | Performed by: INTERNAL MEDICINE

## 2020-05-06 PROCEDURE — 85379 FIBRIN DEGRADATION QUANT: CPT | Performed by: INTERNAL MEDICINE

## 2020-05-06 RX ADMIN — HYDROXYCHLOROQUINE SULFATE 200 MG: 200 TABLET, FILM COATED ORAL at 03:29

## 2020-05-06 RX ADMIN — GUAIFENESIN 600 MG: 600 TABLET ORAL at 09:53

## 2020-05-06 RX ADMIN — ZINC SULFATE 220 MG (50 MG) CAPSULE 220 MG: CAPSULE at 09:53

## 2020-05-06 RX ADMIN — GUAIFENESIN 600 MG: 600 TABLET ORAL at 17:13

## 2020-05-06 RX ADMIN — LISINOPRIL 5 MG: 5 TABLET ORAL at 09:54

## 2020-05-06 RX ADMIN — OXYCODONE HYDROCHLORIDE AND ACETAMINOPHEN 1000 MG: 500 TABLET ORAL at 22:08

## 2020-05-06 RX ADMIN — OXYCODONE HYDROCHLORIDE AND ACETAMINOPHEN 1000 MG: 500 TABLET ORAL at 09:53

## 2020-05-06 RX ADMIN — DOCUSATE SODIUM 100 MG: 100 CAPSULE, LIQUID FILLED ORAL at 09:53

## 2020-05-06 RX ADMIN — METHYLPREDNISOLONE SODIUM SUCCINATE 40 MG: 40 INJECTION, POWDER, FOR SOLUTION INTRAMUSCULAR; INTRAVENOUS at 09:53

## 2020-05-06 RX ADMIN — DOCUSATE SODIUM 100 MG: 100 CAPSULE, LIQUID FILLED ORAL at 17:13

## 2020-05-06 RX ADMIN — METHYLPREDNISOLONE SODIUM SUCCINATE 40 MG: 40 INJECTION, POWDER, FOR SOLUTION INTRAMUSCULAR; INTRAVENOUS at 22:14

## 2020-05-06 RX ADMIN — METOPROLOL SUCCINATE 100 MG: 100 TABLET, EXTENDED RELEASE ORAL at 09:54

## 2020-05-06 RX ADMIN — ATORVASTATIN CALCIUM 40 MG: 40 TABLET, FILM COATED ORAL at 22:14

## 2020-05-06 RX ADMIN — ENOXAPARIN SODIUM 90 MG: 100 INJECTION SUBCUTANEOUS at 22:14

## 2020-05-06 RX ADMIN — ASPIRIN 81 MG 81 MG: 81 TABLET ORAL at 09:54

## 2020-05-06 RX ADMIN — MELATONIN 2000 UNITS: at 09:54

## 2020-05-06 RX ADMIN — HYDROXYCHLOROQUINE SULFATE 200 MG: 200 TABLET, FILM COATED ORAL at 16:58

## 2020-05-06 RX ADMIN — ALLOPURINOL 300 MG: 300 TABLET ORAL at 09:53

## 2020-05-06 RX ADMIN — ENOXAPARIN SODIUM 90 MG: 100 INJECTION SUBCUTANEOUS at 09:53

## 2020-05-07 LAB
ALBUMIN SERPL BCP-MCNC: 2.5 G/DL (ref 3.5–5)
ALP SERPL-CCNC: 61 U/L (ref 46–116)
ALT SERPL W P-5'-P-CCNC: 29 U/L (ref 12–78)
ANION GAP SERPL CALCULATED.3IONS-SCNC: 9 MMOL/L (ref 4–13)
AST SERPL W P-5'-P-CCNC: 19 U/L (ref 5–45)
ATRIAL RATE: 55 BPM
BACTERIA BLD CULT: NORMAL
BACTERIA BLD CULT: NORMAL
BILIRUB SERPL-MCNC: 0.4 MG/DL (ref 0.2–1)
BUN SERPL-MCNC: 15 MG/DL (ref 5–25)
CALCIUM SERPL-MCNC: 8.7 MG/DL (ref 8.3–10.1)
CHLORIDE SERPL-SCNC: 103 MMOL/L (ref 100–108)
CO2 SERPL-SCNC: 27 MMOL/L (ref 21–32)
CREAT SERPL-MCNC: 0.69 MG/DL (ref 0.6–1.3)
ERYTHROCYTE [DISTWIDTH] IN BLOOD BY AUTOMATED COUNT: 13.8 % (ref 11.6–15.1)
GFR SERPL CREATININE-BSD FRML MDRD: 96 ML/MIN/1.73SQ M
GLUCOSE SERPL-MCNC: 136 MG/DL (ref 65–140)
HCT VFR BLD AUTO: 45.2 % (ref 36.5–49.3)
HGB BLD-MCNC: 14.8 G/DL (ref 12–17)
MCH RBC QN AUTO: 30.6 PG (ref 26.8–34.3)
MCHC RBC AUTO-ENTMCNC: 32.7 G/DL (ref 31.4–37.4)
MCV RBC AUTO: 93 FL (ref 82–98)
P AXIS: 62 DEGREES
PLATELET # BLD AUTO: 308 THOUSANDS/UL (ref 149–390)
PMV BLD AUTO: 12.1 FL (ref 8.9–12.7)
POTASSIUM SERPL-SCNC: 4.4 MMOL/L (ref 3.5–5.3)
PR INTERVAL: 146 MS
PROT SERPL-MCNC: 7 G/DL (ref 6.4–8.2)
QRS AXIS: 8 DEGREES
QRSD INTERVAL: 104 MS
QT INTERVAL: 456 MS
QTC INTERVAL: 436 MS
RBC # BLD AUTO: 4.84 MILLION/UL (ref 3.88–5.62)
SODIUM SERPL-SCNC: 139 MMOL/L (ref 136–145)
T WAVE AXIS: 37 DEGREES
VENTRICULAR RATE: 55 BPM
WBC # BLD AUTO: 13.85 THOUSAND/UL (ref 4.31–10.16)

## 2020-05-07 PROCEDURE — 99232 SBSQ HOSP IP/OBS MODERATE 35: CPT | Performed by: PHYSICIAN ASSISTANT

## 2020-05-07 PROCEDURE — 99233 SBSQ HOSP IP/OBS HIGH 50: CPT | Performed by: INTERNAL MEDICINE

## 2020-05-07 PROCEDURE — 80053 COMPREHEN METABOLIC PANEL: CPT | Performed by: INTERNAL MEDICINE

## 2020-05-07 PROCEDURE — 85027 COMPLETE CBC AUTOMATED: CPT | Performed by: INTERNAL MEDICINE

## 2020-05-07 PROCEDURE — 93010 ELECTROCARDIOGRAM REPORT: CPT | Performed by: INTERNAL MEDICINE

## 2020-05-07 PROCEDURE — 93005 ELECTROCARDIOGRAM TRACING: CPT

## 2020-05-07 RX ORDER — METHYLPREDNISOLONE SODIUM SUCCINATE 40 MG/ML
40 INJECTION, POWDER, LYOPHILIZED, FOR SOLUTION INTRAMUSCULAR; INTRAVENOUS DAILY
Status: DISCONTINUED | OUTPATIENT
Start: 2020-05-08 | End: 2020-05-09

## 2020-05-07 RX ORDER — LISINOPRIL 10 MG/1
10 TABLET ORAL DAILY
Status: DISCONTINUED | OUTPATIENT
Start: 2020-05-07 | End: 2020-05-09 | Stop reason: HOSPADM

## 2020-05-07 RX ADMIN — ALLOPURINOL 300 MG: 300 TABLET ORAL at 08:53

## 2020-05-07 RX ADMIN — ZINC SULFATE 220 MG (50 MG) CAPSULE 220 MG: CAPSULE at 08:53

## 2020-05-07 RX ADMIN — ATORVASTATIN CALCIUM 40 MG: 40 TABLET, FILM COATED ORAL at 23:21

## 2020-05-07 RX ADMIN — HYDROXYCHLOROQUINE SULFATE 200 MG: 200 TABLET, FILM COATED ORAL at 04:19

## 2020-05-07 RX ADMIN — ENOXAPARIN SODIUM 90 MG: 100 INJECTION SUBCUTANEOUS at 23:21

## 2020-05-07 RX ADMIN — ENOXAPARIN SODIUM 90 MG: 100 INJECTION SUBCUTANEOUS at 08:53

## 2020-05-07 RX ADMIN — METHYLPREDNISOLONE SODIUM SUCCINATE 40 MG: 40 INJECTION, POWDER, FOR SOLUTION INTRAMUSCULAR; INTRAVENOUS at 08:53

## 2020-05-07 RX ADMIN — DOCUSATE SODIUM 100 MG: 100 CAPSULE, LIQUID FILLED ORAL at 17:25

## 2020-05-07 RX ADMIN — METOPROLOL SUCCINATE 100 MG: 100 TABLET, EXTENDED RELEASE ORAL at 08:53

## 2020-05-07 RX ADMIN — GUAIFENESIN 600 MG: 600 TABLET ORAL at 08:53

## 2020-05-07 RX ADMIN — OXYCODONE HYDROCHLORIDE AND ACETAMINOPHEN 1000 MG: 500 TABLET ORAL at 08:53

## 2020-05-07 RX ADMIN — OXYCODONE HYDROCHLORIDE AND ACETAMINOPHEN 1000 MG: 500 TABLET ORAL at 23:20

## 2020-05-07 RX ADMIN — MELATONIN 2000 UNITS: at 08:53

## 2020-05-07 RX ADMIN — ASPIRIN 81 MG 81 MG: 81 TABLET ORAL at 08:53

## 2020-05-07 RX ADMIN — GUAIFENESIN 600 MG: 600 TABLET ORAL at 17:25

## 2020-05-07 RX ADMIN — HYDROXYCHLOROQUINE SULFATE 200 MG: 200 TABLET, FILM COATED ORAL at 17:25

## 2020-05-07 RX ADMIN — LISINOPRIL 10 MG: 10 TABLET ORAL at 08:53

## 2020-05-08 LAB
ALBUMIN SERPL BCP-MCNC: 2.4 G/DL (ref 3.5–5)
ALP SERPL-CCNC: 62 U/L (ref 46–116)
ALT SERPL W P-5'-P-CCNC: 49 U/L (ref 12–78)
ANION GAP SERPL CALCULATED.3IONS-SCNC: 10 MMOL/L (ref 4–13)
AST SERPL W P-5'-P-CCNC: 35 U/L (ref 5–45)
BILIRUB SERPL-MCNC: 0.4 MG/DL (ref 0.2–1)
BUN SERPL-MCNC: 18 MG/DL (ref 5–25)
CALCIUM SERPL-MCNC: 8.5 MG/DL (ref 8.3–10.1)
CHLORIDE SERPL-SCNC: 105 MMOL/L (ref 100–108)
CO2 SERPL-SCNC: 26 MMOL/L (ref 21–32)
CREAT SERPL-MCNC: 0.73 MG/DL (ref 0.6–1.3)
ERYTHROCYTE [DISTWIDTH] IN BLOOD BY AUTOMATED COUNT: 14.3 % (ref 11.6–15.1)
GFR SERPL CREATININE-BSD FRML MDRD: 93 ML/MIN/1.73SQ M
GLUCOSE SERPL-MCNC: 99 MG/DL (ref 65–140)
HCT VFR BLD AUTO: 47.2 % (ref 36.5–49.3)
HGB BLD-MCNC: 15.2 G/DL (ref 12–17)
MCH RBC QN AUTO: 30.9 PG (ref 26.8–34.3)
MCHC RBC AUTO-ENTMCNC: 32.2 G/DL (ref 31.4–37.4)
MCV RBC AUTO: 96 FL (ref 82–98)
PLATELET # BLD AUTO: 296 THOUSANDS/UL (ref 149–390)
PMV BLD AUTO: 12.6 FL (ref 8.9–12.7)
POTASSIUM SERPL-SCNC: 4.2 MMOL/L (ref 3.5–5.3)
PROT SERPL-MCNC: 6.7 G/DL (ref 6.4–8.2)
RBC # BLD AUTO: 4.92 MILLION/UL (ref 3.88–5.62)
SODIUM SERPL-SCNC: 141 MMOL/L (ref 136–145)
WBC # BLD AUTO: 14.81 THOUSAND/UL (ref 4.31–10.16)

## 2020-05-08 PROCEDURE — 80053 COMPREHEN METABOLIC PANEL: CPT | Performed by: INTERNAL MEDICINE

## 2020-05-08 PROCEDURE — 93005 ELECTROCARDIOGRAM TRACING: CPT

## 2020-05-08 PROCEDURE — 85027 COMPLETE CBC AUTOMATED: CPT | Performed by: INTERNAL MEDICINE

## 2020-05-08 PROCEDURE — 99232 SBSQ HOSP IP/OBS MODERATE 35: CPT | Performed by: INTERNAL MEDICINE

## 2020-05-08 PROCEDURE — 99232 SBSQ HOSP IP/OBS MODERATE 35: CPT | Performed by: PHYSICIAN ASSISTANT

## 2020-05-08 RX ADMIN — METOPROLOL SUCCINATE 100 MG: 100 TABLET, EXTENDED RELEASE ORAL at 08:43

## 2020-05-08 RX ADMIN — ENOXAPARIN SODIUM 40 MG: 100 INJECTION SUBCUTANEOUS at 08:44

## 2020-05-08 RX ADMIN — DOCUSATE SODIUM 100 MG: 100 CAPSULE, LIQUID FILLED ORAL at 08:42

## 2020-05-08 RX ADMIN — GUAIFENESIN 600 MG: 600 TABLET ORAL at 17:18

## 2020-05-08 RX ADMIN — ALLOPURINOL 300 MG: 300 TABLET ORAL at 08:43

## 2020-05-08 RX ADMIN — OXYCODONE HYDROCHLORIDE AND ACETAMINOPHEN 1000 MG: 500 TABLET ORAL at 20:10

## 2020-05-08 RX ADMIN — HYDROXYCHLOROQUINE SULFATE 200 MG: 200 TABLET, FILM COATED ORAL at 17:00

## 2020-05-08 RX ADMIN — MELATONIN 2000 UNITS: at 08:43

## 2020-05-08 RX ADMIN — ATORVASTATIN CALCIUM 40 MG: 40 TABLET, FILM COATED ORAL at 20:10

## 2020-05-08 RX ADMIN — DOCUSATE SODIUM 100 MG: 100 CAPSULE, LIQUID FILLED ORAL at 17:18

## 2020-05-08 RX ADMIN — OXYCODONE HYDROCHLORIDE AND ACETAMINOPHEN 1000 MG: 500 TABLET ORAL at 08:42

## 2020-05-08 RX ADMIN — METHYLPREDNISOLONE SODIUM SUCCINATE 40 MG: 40 INJECTION, POWDER, FOR SOLUTION INTRAMUSCULAR; INTRAVENOUS at 08:42

## 2020-05-08 RX ADMIN — HYDROXYCHLOROQUINE SULFATE 200 MG: 200 TABLET, FILM COATED ORAL at 05:11

## 2020-05-08 RX ADMIN — ENOXAPARIN SODIUM 30 MG: 30 INJECTION SUBCUTANEOUS at 20:10

## 2020-05-08 RX ADMIN — GUAIFENESIN 600 MG: 600 TABLET ORAL at 08:43

## 2020-05-08 RX ADMIN — ZINC SULFATE 220 MG (50 MG) CAPSULE 220 MG: CAPSULE at 08:43

## 2020-05-08 RX ADMIN — LISINOPRIL 10 MG: 10 TABLET ORAL at 08:46

## 2020-05-08 RX ADMIN — ASPIRIN 81 MG 81 MG: 81 TABLET ORAL at 08:42

## 2020-05-09 VITALS
HEIGHT: 70 IN | DIASTOLIC BLOOD PRESSURE: 68 MMHG | BODY MASS INDEX: 29.79 KG/M2 | SYSTOLIC BLOOD PRESSURE: 125 MMHG | HEART RATE: 57 BPM | RESPIRATION RATE: 18 BRPM | OXYGEN SATURATION: 97 % | WEIGHT: 208.11 LBS | TEMPERATURE: 98 F

## 2020-05-09 PROCEDURE — 99239 HOSP IP/OBS DSCHRG MGMT >30: CPT | Performed by: INTERNAL MEDICINE

## 2020-05-09 RX ORDER — GUAIFENESIN 600 MG
600 TABLET, EXTENDED RELEASE 12 HR ORAL 2 TIMES DAILY
Qty: 20 TABLET | Refills: 0 | Status: SHIPPED | OUTPATIENT
Start: 2020-05-09 | End: 2020-05-19

## 2020-05-09 RX ORDER — PANTOPRAZOLE SODIUM 40 MG/1
40 TABLET, DELAYED RELEASE ORAL DAILY
Qty: 30 TABLET | Refills: 0 | Status: ON HOLD | OUTPATIENT
Start: 2020-05-09 | End: 2020-06-08

## 2020-05-09 RX ORDER — PREDNISONE 20 MG/1
40 TABLET ORAL DAILY
Status: DISCONTINUED | OUTPATIENT
Start: 2020-05-10 | End: 2020-05-09 | Stop reason: HOSPADM

## 2020-05-09 RX ORDER — DIPHENOXYLATE HYDROCHLORIDE AND ATROPINE SULFATE 2.5; .025 MG/1; MG/1
1 TABLET ORAL DAILY
Qty: 30 TABLET | Refills: 0 | Status: ON HOLD | OUTPATIENT
Start: 2020-05-09

## 2020-05-09 RX ORDER — LISINOPRIL 10 MG/1
10 TABLET ORAL DAILY
Qty: 30 TABLET | Refills: 2 | Status: ON HOLD | OUTPATIENT
Start: 2020-05-10

## 2020-05-09 RX ORDER — PREDNISONE 20 MG/1
40 TABLET ORAL DAILY
Qty: 30 TABLET | Refills: 0 | Status: SHIPPED | OUTPATIENT
Start: 2020-05-10 | End: 2020-05-13

## 2020-05-09 RX ADMIN — MELATONIN 2000 UNITS: at 08:46

## 2020-05-09 RX ADMIN — DOCUSATE SODIUM 100 MG: 100 CAPSULE, LIQUID FILLED ORAL at 08:46

## 2020-05-09 RX ADMIN — GUAIFENESIN 600 MG: 600 TABLET ORAL at 08:46

## 2020-05-09 RX ADMIN — Medication 1 TABLET: at 08:46

## 2020-05-09 RX ADMIN — ASPIRIN 81 MG 81 MG: 81 TABLET ORAL at 08:46

## 2020-05-09 RX ADMIN — ENOXAPARIN SODIUM 30 MG: 30 INJECTION SUBCUTANEOUS at 08:45

## 2020-05-09 RX ADMIN — ALLOPURINOL 300 MG: 300 TABLET ORAL at 08:46

## 2020-05-09 RX ADMIN — METHYLPREDNISOLONE SODIUM SUCCINATE 40 MG: 40 INJECTION, POWDER, FOR SOLUTION INTRAMUSCULAR; INTRAVENOUS at 08:46

## 2020-05-09 RX ADMIN — LISINOPRIL 10 MG: 10 TABLET ORAL at 08:46

## 2020-05-09 RX ADMIN — METOPROLOL SUCCINATE 100 MG: 100 TABLET, EXTENDED RELEASE ORAL at 08:46

## 2020-05-11 LAB
ATRIAL RATE: 52 BPM
P AXIS: 57 DEGREES
PR INTERVAL: 150 MS
QRS AXIS: 9 DEGREES
QRSD INTERVAL: 108 MS
QT INTERVAL: 468 MS
QTC INTERVAL: 435 MS
T WAVE AXIS: 21 DEGREES
VENTRICULAR RATE: 52 BPM

## 2020-05-11 PROCEDURE — 93010 ELECTROCARDIOGRAM REPORT: CPT | Performed by: INTERNAL MEDICINE

## 2020-06-07 ENCOUNTER — APPOINTMENT (OUTPATIENT)
Dept: RADIOLOGY | Facility: CLINIC | Age: 70
End: 2020-06-07
Payer: MEDICARE

## 2020-06-07 ENCOUNTER — OFFICE VISIT (OUTPATIENT)
Dept: URGENT CARE | Facility: CLINIC | Age: 70
End: 2020-06-07
Payer: MEDICARE

## 2020-06-07 ENCOUNTER — TELEPHONE (OUTPATIENT)
Dept: URGENT CARE | Facility: CLINIC | Age: 70
End: 2020-06-07

## 2020-06-07 VITALS
RESPIRATION RATE: 20 BRPM | SYSTOLIC BLOOD PRESSURE: 160 MMHG | TEMPERATURE: 98.3 F | HEART RATE: 88 BPM | DIASTOLIC BLOOD PRESSURE: 84 MMHG | OXYGEN SATURATION: 96 %

## 2020-06-07 DIAGNOSIS — R05.9 COUGH: ICD-10-CM

## 2020-06-07 DIAGNOSIS — R05.9 COUGH: Primary | ICD-10-CM

## 2020-06-07 PROCEDURE — 99203 OFFICE O/P NEW LOW 30 MIN: CPT | Performed by: PREVENTIVE MEDICINE

## 2020-06-07 PROCEDURE — G0463 HOSPITAL OUTPT CLINIC VISIT: HCPCS | Performed by: PREVENTIVE MEDICINE

## 2020-06-07 PROCEDURE — U0003 INFECTIOUS AGENT DETECTION BY NUCLEIC ACID (DNA OR RNA); SEVERE ACUTE RESPIRATORY SYNDROME CORONAVIRUS 2 (SARS-COV-2) (CORONAVIRUS DISEASE [COVID-19]), AMPLIFIED PROBE TECHNIQUE, MAKING USE OF HIGH THROUGHPUT TECHNOLOGIES AS DESCRIBED BY CMS-2020-01-R: HCPCS | Performed by: PREVENTIVE MEDICINE

## 2020-06-07 PROCEDURE — 71046 X-RAY EXAM CHEST 2 VIEWS: CPT

## 2020-06-07 RX ORDER — BROMPHENIRAMINE MALEATE, PSEUDOEPHEDRINE HYDROCHLORIDE, AND DEXTROMETHORPHAN HYDROBROMIDE 2; 30; 10 MG/5ML; MG/5ML; MG/5ML
5 SYRUP ORAL 4 TIMES DAILY PRN
Qty: 120 ML | Refills: 0 | Status: SHIPPED | OUTPATIENT
Start: 2020-06-07 | End: 2020-06-10 | Stop reason: SDUPTHER

## 2020-06-07 RX ORDER — AZITHROMYCIN 250 MG/1
TABLET, FILM COATED ORAL
Qty: 6 TABLET | Refills: 0 | Status: SHIPPED | OUTPATIENT
Start: 2020-06-07 | End: 2020-06-11

## 2020-06-07 RX ORDER — METHYLPREDNISOLONE 4 MG/1
TABLET ORAL
Qty: 21 EACH | Refills: 0 | Status: SHIPPED | OUTPATIENT
Start: 2020-06-07 | End: 2020-06-19 | Stop reason: SDUPTHER

## 2020-06-08 ENCOUNTER — TELEPHONE (OUTPATIENT)
Dept: URGENT CARE | Facility: CLINIC | Age: 70
End: 2020-06-08

## 2020-06-09 ENCOUNTER — TELEPHONE (OUTPATIENT)
Dept: URGENT CARE | Facility: CLINIC | Age: 70
End: 2020-06-09

## 2020-06-09 LAB — SARS-COV-2 RNA SPEC QL NAA+PROBE: NOT DETECTED

## 2020-06-10 ENCOUNTER — TELEPHONE (OUTPATIENT)
Dept: URGENT CARE | Facility: CLINIC | Age: 70
End: 2020-06-10

## 2020-06-10 ENCOUNTER — OFFICE VISIT (OUTPATIENT)
Dept: FAMILY MEDICINE CLINIC | Facility: CLINIC | Age: 70
End: 2020-06-10
Payer: MEDICARE

## 2020-06-10 VITALS
HEART RATE: 78 BPM | HEIGHT: 70 IN | OXYGEN SATURATION: 96 % | WEIGHT: 215.6 LBS | RESPIRATION RATE: 13 BRPM | SYSTOLIC BLOOD PRESSURE: 110 MMHG | DIASTOLIC BLOOD PRESSURE: 70 MMHG | BODY MASS INDEX: 30.86 KG/M2

## 2020-06-10 DIAGNOSIS — J96.01 ACUTE RESPIRATORY FAILURE WITH HYPOXIA (HCC): ICD-10-CM

## 2020-06-10 DIAGNOSIS — Z12.11 COLON CANCER SCREENING: Primary | ICD-10-CM

## 2020-06-10 DIAGNOSIS — Z00.00 HEALTH CARE MAINTENANCE: ICD-10-CM

## 2020-06-10 DIAGNOSIS — R05.9 COUGH: ICD-10-CM

## 2020-06-10 DIAGNOSIS — I15.9 SECONDARY HYPERTENSION: ICD-10-CM

## 2020-06-10 DIAGNOSIS — Z11.59 NEED FOR HEPATITIS C SCREENING TEST: ICD-10-CM

## 2020-06-10 DIAGNOSIS — Z76.89 ENCOUNTER TO ESTABLISH CARE: ICD-10-CM

## 2020-06-10 DIAGNOSIS — E78.49 OTHER HYPERLIPIDEMIA: ICD-10-CM

## 2020-06-10 PROCEDURE — 1036F TOBACCO NON-USER: CPT | Performed by: NURSE PRACTITIONER

## 2020-06-10 PROCEDURE — 3008F BODY MASS INDEX DOCD: CPT | Performed by: NURSE PRACTITIONER

## 2020-06-10 PROCEDURE — 3074F SYST BP LT 130 MM HG: CPT | Performed by: NURSE PRACTITIONER

## 2020-06-10 PROCEDURE — 99204 OFFICE O/P NEW MOD 45 MIN: CPT | Performed by: NURSE PRACTITIONER

## 2020-06-10 PROCEDURE — 3078F DIAST BP <80 MM HG: CPT | Performed by: NURSE PRACTITIONER

## 2020-06-10 PROCEDURE — 1160F RVW MEDS BY RX/DR IN RCRD: CPT | Performed by: NURSE PRACTITIONER

## 2020-06-10 PROCEDURE — 1111F DSCHRG MED/CURRENT MED MERGE: CPT | Performed by: NURSE PRACTITIONER

## 2020-06-10 RX ORDER — OXYBUTYNIN CHLORIDE 5 MG/1
5 TABLET ORAL DAILY
Status: ON HOLD | COMMUNITY

## 2020-06-10 RX ORDER — TAMSULOSIN HYDROCHLORIDE 0.4 MG/1
0.4 CAPSULE ORAL
Status: ON HOLD | COMMUNITY

## 2020-06-10 RX ORDER — ALLOPURINOL 300 MG/1
300 TABLET ORAL DAILY
Status: ON HOLD | COMMUNITY

## 2020-06-10 RX ORDER — BROMPHENIRAMINE MALEATE, PSEUDOEPHEDRINE HYDROCHLORIDE, AND DEXTROMETHORPHAN HYDROBROMIDE 2; 30; 10 MG/5ML; MG/5ML; MG/5ML
5 SYRUP ORAL 4 TIMES DAILY PRN
Qty: 120 ML | Refills: 0 | Status: SHIPPED | OUTPATIENT
Start: 2020-06-10 | End: 2020-06-19 | Stop reason: SDUPTHER

## 2020-06-10 RX ORDER — SIMVASTATIN 10 MG
20 TABLET ORAL
Qty: 30 TABLET | Refills: 3 | Status: ON HOLD | OUTPATIENT
Start: 2020-06-10

## 2020-06-11 ENCOUNTER — APPOINTMENT (OUTPATIENT)
Dept: LAB | Facility: HOSPITAL | Age: 70
End: 2020-06-11
Payer: MEDICARE

## 2020-06-11 DIAGNOSIS — Z00.00 HEALTH CARE MAINTENANCE: ICD-10-CM

## 2020-06-11 DIAGNOSIS — I15.9 SECONDARY HYPERTENSION: ICD-10-CM

## 2020-06-11 DIAGNOSIS — Z11.59 NEED FOR HEPATITIS C SCREENING TEST: ICD-10-CM

## 2020-06-11 LAB
ALBUMIN SERPL BCP-MCNC: 3.4 G/DL (ref 3.5–5)
ALP SERPL-CCNC: 71 U/L (ref 46–116)
ALT SERPL W P-5'-P-CCNC: 48 U/L (ref 12–78)
ANION GAP SERPL CALCULATED.3IONS-SCNC: 8 MMOL/L (ref 4–13)
AST SERPL W P-5'-P-CCNC: 19 U/L (ref 5–45)
BASOPHILS # BLD MANUAL: 0 THOUSAND/UL (ref 0–0.1)
BASOPHILS NFR MAR MANUAL: 0 % (ref 0–1)
BILIRUB SERPL-MCNC: 0.4 MG/DL (ref 0.2–1)
BUN SERPL-MCNC: 15 MG/DL (ref 5–25)
CALCIUM SERPL-MCNC: 9.2 MG/DL (ref 8.3–10.1)
CHLORIDE SERPL-SCNC: 99 MMOL/L (ref 100–108)
CHOLEST SERPL-MCNC: 214 MG/DL (ref 50–200)
CO2 SERPL-SCNC: 30 MMOL/L (ref 21–32)
CREAT SERPL-MCNC: 0.63 MG/DL (ref 0.6–1.3)
CREAT UR-MCNC: 69.4 MG/DL
EOSINOPHIL # BLD MANUAL: 0 THOUSAND/UL (ref 0–0.4)
EOSINOPHIL NFR BLD MANUAL: 0 % (ref 0–6)
ERYTHROCYTE [DISTWIDTH] IN BLOOD BY AUTOMATED COUNT: 15.8 % (ref 11.6–15.1)
GFR SERPL CREATININE-BSD FRML MDRD: 99 ML/MIN/1.73SQ M
GLUCOSE P FAST SERPL-MCNC: 97 MG/DL (ref 65–99)
HCT VFR BLD AUTO: 44.7 % (ref 36.5–49.3)
HCV AB SER QL: NORMAL
HDLC SERPL-MCNC: 96 MG/DL
HGB BLD-MCNC: 14.4 G/DL (ref 12–17)
LDLC SERPL CALC-MCNC: 109 MG/DL (ref 0–100)
LYMPHOCYTES # BLD AUTO: 3.48 THOUSAND/UL (ref 0.6–4.47)
LYMPHOCYTES # BLD AUTO: 32 % (ref 14–44)
MCH RBC QN AUTO: 30.5 PG (ref 26.8–34.3)
MCHC RBC AUTO-ENTMCNC: 32.2 G/DL (ref 31.4–37.4)
MCV RBC AUTO: 95 FL (ref 82–98)
MICROALBUMIN UR-MCNC: 29 MG/L (ref 0–20)
MICROALBUMIN/CREAT 24H UR: 42 MG/G CREATININE (ref 0–30)
MONOCYTES # BLD AUTO: 0.87 THOUSAND/UL (ref 0–1.22)
MONOCYTES NFR BLD: 8 % (ref 4–12)
NEUTROPHILS # BLD MANUAL: 6.52 THOUSAND/UL (ref 1.85–7.62)
NEUTS BAND NFR BLD MANUAL: 1 % (ref 0–8)
NEUTS SEG NFR BLD AUTO: 59 % (ref 43–75)
NONHDLC SERPL-MCNC: 118 MG/DL
NRBC BLD AUTO-RTO: 0 /100 WBCS
PLATELET # BLD AUTO: 198 THOUSANDS/UL (ref 149–390)
PLATELET BLD QL SMEAR: ADEQUATE
PMV BLD AUTO: 12.1 FL (ref 8.9–12.7)
POTASSIUM SERPL-SCNC: 4.2 MMOL/L (ref 3.5–5.3)
PROT SERPL-MCNC: 7.7 G/DL (ref 6.4–8.2)
RBC # BLD AUTO: 4.72 MILLION/UL (ref 3.88–5.62)
SODIUM SERPL-SCNC: 137 MMOL/L (ref 136–145)
TOTAL CELLS COUNTED SPEC: 100
TRIGL SERPL-MCNC: 45 MG/DL
TSH SERPL DL<=0.05 MIU/L-ACNC: 1.45 UIU/ML (ref 0.36–3.74)
WBC # BLD AUTO: 10.87 THOUSAND/UL (ref 4.31–10.16)

## 2020-06-11 PROCEDURE — 82043 UR ALBUMIN QUANTITATIVE: CPT | Performed by: NURSE PRACTITIONER

## 2020-06-11 PROCEDURE — 85027 COMPLETE CBC AUTOMATED: CPT

## 2020-06-11 PROCEDURE — 84443 ASSAY THYROID STIM HORMONE: CPT

## 2020-06-11 PROCEDURE — 80053 COMPREHEN METABOLIC PANEL: CPT

## 2020-06-11 PROCEDURE — 80061 LIPID PANEL: CPT

## 2020-06-11 PROCEDURE — 86803 HEPATITIS C AB TEST: CPT

## 2020-06-11 PROCEDURE — 36415 COLL VENOUS BLD VENIPUNCTURE: CPT

## 2020-06-11 PROCEDURE — 85007 BL SMEAR W/DIFF WBC COUNT: CPT

## 2020-06-11 PROCEDURE — 82570 ASSAY OF URINE CREATININE: CPT | Performed by: NURSE PRACTITIONER

## 2020-06-18 ENCOUNTER — TELEPHONE (OUTPATIENT)
Dept: FAMILY MEDICINE CLINIC | Facility: CLINIC | Age: 70
End: 2020-06-18

## 2020-06-19 ENCOUNTER — OFFICE VISIT (OUTPATIENT)
Dept: FAMILY MEDICINE CLINIC | Facility: CLINIC | Age: 70
End: 2020-06-19
Payer: MEDICARE

## 2020-06-19 ENCOUNTER — HOSPITAL ENCOUNTER (OUTPATIENT)
Dept: RADIOLOGY | Facility: HOSPITAL | Age: 70
Discharge: HOME | End: 2020-06-19
Payer: MEDICARE

## 2020-06-19 VITALS
TEMPERATURE: 98.5 F | SYSTOLIC BLOOD PRESSURE: 132 MMHG | WEIGHT: 213.4 LBS | HEART RATE: 73 BPM | BODY MASS INDEX: 30.55 KG/M2 | DIASTOLIC BLOOD PRESSURE: 86 MMHG | HEIGHT: 70 IN | OXYGEN SATURATION: 97 %

## 2020-06-19 DIAGNOSIS — Z00.00 MEDICARE ANNUAL WELLNESS VISIT, SUBSEQUENT: ICD-10-CM

## 2020-06-19 DIAGNOSIS — R05.9 COUGH: ICD-10-CM

## 2020-06-19 DIAGNOSIS — Z12.2 ENCOUNTER FOR SCREENING FOR LUNG CANCER: ICD-10-CM

## 2020-06-19 DIAGNOSIS — J96.01 ACUTE RESPIRATORY FAILURE WITH HYPOXIA (HCC): ICD-10-CM

## 2020-06-19 DIAGNOSIS — I15.9 SECONDARY HYPERTENSION: ICD-10-CM

## 2020-06-19 DIAGNOSIS — I15.9 SECONDARY HYPERTENSION: Primary | ICD-10-CM

## 2020-06-19 PROCEDURE — G0439 PPPS, SUBSEQ VISIT: HCPCS | Performed by: NURSE PRACTITIONER

## 2020-06-19 PROCEDURE — 3079F DIAST BP 80-89 MM HG: CPT | Performed by: NURSE PRACTITIONER

## 2020-06-19 PROCEDURE — 1170F FXNL STATUS ASSESSED: CPT | Performed by: NURSE PRACTITIONER

## 2020-06-19 PROCEDURE — 1036F TOBACCO NON-USER: CPT | Performed by: NURSE PRACTITIONER

## 2020-06-19 PROCEDURE — 3075F SYST BP GE 130 - 139MM HG: CPT | Performed by: NURSE PRACTITIONER

## 2020-06-19 PROCEDURE — 1111F DSCHRG MED/CURRENT MED MERGE: CPT | Performed by: NURSE PRACTITIONER

## 2020-06-19 PROCEDURE — 1160F RVW MEDS BY RX/DR IN RCRD: CPT | Performed by: NURSE PRACTITIONER

## 2020-06-19 PROCEDURE — 71046 X-RAY EXAM CHEST 2 VIEWS: CPT

## 2020-06-19 PROCEDURE — 1125F AMNT PAIN NOTED PAIN PRSNT: CPT | Performed by: NURSE PRACTITIONER

## 2020-06-19 RX ORDER — METOPROLOL SUCCINATE 50 MG/1
100 TABLET, EXTENDED RELEASE ORAL DAILY
Qty: 10 TABLET | Refills: 0 | OUTPATIENT
Start: 2020-06-19

## 2020-06-19 RX ORDER — PROMETHAZINE HYDROCHLORIDE AND CODEINE PHOSPHATE 6.25; 1 MG/5ML; MG/5ML
5 SYRUP ORAL EVERY 4 HOURS PRN
Qty: 60 ML | Refills: 0 | Status: SHIPPED | OUTPATIENT
Start: 2020-06-19 | End: 2020-07-07 | Stop reason: SDUPTHER

## 2020-06-19 RX ORDER — METOPROLOL SUCCINATE 50 MG/1
100 TABLET, EXTENDED RELEASE ORAL DAILY
Qty: 10 TABLET | Refills: 0 | Status: ON HOLD | OUTPATIENT
Start: 2020-06-19

## 2020-06-19 RX ORDER — BROMPHENIRAMINE MALEATE, PSEUDOEPHEDRINE HYDROCHLORIDE, AND DEXTROMETHORPHAN HYDROBROMIDE 2; 30; 10 MG/5ML; MG/5ML; MG/5ML
5 SYRUP ORAL 4 TIMES DAILY PRN
Qty: 120 ML | Refills: 0 | Status: SHIPPED | OUTPATIENT
Start: 2020-06-19 | End: 2020-07-07 | Stop reason: SDUPTHER

## 2020-06-19 RX ORDER — BROMPHENIRAMINE MALEATE, PSEUDOEPHEDRINE HYDROCHLORIDE, AND DEXTROMETHORPHAN HYDROBROMIDE 2; 30; 10 MG/5ML; MG/5ML; MG/5ML
5 SYRUP ORAL 4 TIMES DAILY PRN
Qty: 120 ML | Refills: 0 | OUTPATIENT
Start: 2020-06-19

## 2020-06-19 RX ORDER — METHYLPREDNISOLONE 4 MG/1
TABLET ORAL
Qty: 21 EACH | Refills: 0 | Status: ON HOLD | OUTPATIENT
Start: 2020-06-19

## 2020-06-22 ENCOUNTER — HOSPITAL ENCOUNTER (OUTPATIENT)
Dept: CT IMAGING | Facility: HOSPITAL | Age: 70
Discharge: HOME | End: 2020-06-22
Payer: COMMERCIAL

## 2020-06-22 DIAGNOSIS — Z12.2 ENCOUNTER FOR SCREENING FOR LUNG CANCER: ICD-10-CM

## 2020-06-22 PROCEDURE — G0297 LDCT FOR LUNG CA SCREEN: HCPCS

## 2020-07-07 ENCOUNTER — OFFICE VISIT (OUTPATIENT)
Dept: FAMILY MEDICINE CLINIC | Facility: CLINIC | Age: 70
End: 2020-07-07
Payer: MEDICARE

## 2020-07-07 VITALS
RESPIRATION RATE: 17 BRPM | WEIGHT: 217.4 LBS | DIASTOLIC BLOOD PRESSURE: 70 MMHG | TEMPERATURE: 96.2 F | BODY MASS INDEX: 31.12 KG/M2 | SYSTOLIC BLOOD PRESSURE: 130 MMHG | HEIGHT: 70 IN | HEART RATE: 78 BPM | OXYGEN SATURATION: 94 %

## 2020-07-07 DIAGNOSIS — R09.81 SINUS CONGESTION: ICD-10-CM

## 2020-07-07 DIAGNOSIS — Z23 NEED FOR VACCINATION: ICD-10-CM

## 2020-07-07 DIAGNOSIS — R53.1 GENERALIZED WEAKNESS: Primary | ICD-10-CM

## 2020-07-07 DIAGNOSIS — R05.9 COUGH: ICD-10-CM

## 2020-07-07 PROCEDURE — 4040F PNEUMOC VAC/ADMIN/RCVD: CPT | Performed by: NURSE PRACTITIONER

## 2020-07-07 PROCEDURE — 90670 PCV13 VACCINE IM: CPT

## 2020-07-07 PROCEDURE — G0009 ADMIN PNEUMOCOCCAL VACCINE: HCPCS

## 2020-07-07 PROCEDURE — 3078F DIAST BP <80 MM HG: CPT | Performed by: NURSE PRACTITIONER

## 2020-07-07 PROCEDURE — 99214 OFFICE O/P EST MOD 30 MIN: CPT | Performed by: NURSE PRACTITIONER

## 2020-07-07 PROCEDURE — 3008F BODY MASS INDEX DOCD: CPT | Performed by: NURSE PRACTITIONER

## 2020-07-07 PROCEDURE — 1160F RVW MEDS BY RX/DR IN RCRD: CPT | Performed by: NURSE PRACTITIONER

## 2020-07-07 PROCEDURE — 1111F DSCHRG MED/CURRENT MED MERGE: CPT | Performed by: NURSE PRACTITIONER

## 2020-07-07 PROCEDURE — 1170F FXNL STATUS ASSESSED: CPT | Performed by: NURSE PRACTITIONER

## 2020-07-07 PROCEDURE — 1036F TOBACCO NON-USER: CPT | Performed by: NURSE PRACTITIONER

## 2020-07-07 PROCEDURE — 3075F SYST BP GE 130 - 139MM HG: CPT | Performed by: NURSE PRACTITIONER

## 2020-07-07 RX ORDER — FLUTICASONE PROPIONATE 50 MCG
1 SPRAY, SUSPENSION (ML) NASAL DAILY
Qty: 1 BOTTLE | Refills: 0 | Status: ON HOLD | OUTPATIENT
Start: 2020-07-07

## 2020-07-07 RX ORDER — BROMPHENIRAMINE MALEATE, PSEUDOEPHEDRINE HYDROCHLORIDE, AND DEXTROMETHORPHAN HYDROBROMIDE 2; 30; 10 MG/5ML; MG/5ML; MG/5ML
5 SYRUP ORAL 4 TIMES DAILY PRN
Qty: 120 ML | Refills: 0 | Status: ON HOLD | OUTPATIENT
Start: 2020-07-07

## 2020-07-07 RX ORDER — PROMETHAZINE HYDROCHLORIDE AND CODEINE PHOSPHATE 6.25; 1 MG/5ML; MG/5ML
5 SYRUP ORAL EVERY 4 HOURS PRN
Qty: 60 ML | Refills: 0 | Status: ON HOLD | OUTPATIENT
Start: 2020-07-07

## 2020-07-07 NOTE — PATIENT INSTRUCTIONS
Get physical therapy done    Continue cough medicine  Use steam to help decongest   Use Flonase daily increase fluid hydration

## 2020-07-07 NOTE — PROGRESS NOTES
Assessment/Plan:     Generalized weakness  Generalized weakness post covid infection  Physical therapy ordered  Cough  Continues to have a dry nonproductive cough  Advised to use steam   Medication refilled    Sinus congestion  Flonase daily  Use steam to help decongest    Need for vaccination  Education provided  Consent obtained  Vaccine administered         Problem List Items Addressed This Visit        Respiratory    Sinus congestion     Flonase daily  Use steam to help decongest         Relevant Medications    fluticasone (FLONASE) 50 mcg/act nasal spray       Other    Generalized weakness - Primary     Generalized weakness post covid infection  Physical therapy ordered  Relevant Orders    Ambulatory referral to Physical Therapy    Need for vaccination     Education provided  Consent obtained  Vaccine administered         Relevant Orders    PNEUMOCOCCAL CONJUGATE VACCINE 13-VALENT GREATER THAN 6 MONTHS (Completed)    Cough     Continues to have a dry nonproductive cough  Advised to use steam   Medication refilled         Relevant Medications    brompheniramine-pseudoephedrine-DM 30-2-10 MG/5ML syrup    promethazine-codeine (PHENERGAN WITH CODEINE) 6 25-10 mg/5 mL syrup            Subjective:      Patient ID: Joshua Garcia is a 69 y o  male  Patient is here with continued complaints of a cough  Was positive for call with virus  Also complains of generalized weakness  States that he walks  a lot in the city  Does not do that as much out here  Also needs to get the pneumonia vaccine        The following portions of the patient's history were reviewed and updated as appropriate: allergies, current medications, past family history, past medical history, past social history, past surgical history and problem list     Review of Systems   Constitutional: Negative  Negative for chills and fever  HENT: Negative  Eyes: Negative      Respiratory: Positive for cough (Nonproductive)  Negative for choking, shortness of breath and wheezing  Cardiovascular: Negative  Gastrointestinal: Negative  Endocrine: Negative  Genitourinary: Negative  Musculoskeletal: Positive for myalgias ( generalized weakness)  Skin: Negative  Allergic/Immunologic: Negative  Neurological: Negative  Hematological: Negative  Psychiatric/Behavioral: Negative  Objective:      /70 (BP Location: Right arm, Patient Position: Sitting, Cuff Size: Adult)   Pulse 78   Temp (!) 96 2 °F (35 7 °C)   Resp 17   Ht 5' 10" (1 778 m)   Wt 98 6 kg (217 lb 6 4 oz)   SpO2 94%   BMI 31 19 kg/m²          Physical Exam   Constitutional: He is oriented to person, place, and time  He appears well-developed and well-nourished  HENT:   Head: Normocephalic and atraumatic  Eyes: Pupils are equal, round, and reactive to light  Neck: Normal range of motion  Cardiovascular: Normal rate and regular rhythm  Pulmonary/Chest: Effort normal and breath sounds normal  He has no wheezes  He exhibits no tenderness  Musculoskeletal: Normal range of motion  Neurological: He is oriented to person, place, and time  Skin: Skin is warm and dry  Psychiatric: He has a normal mood and affect  His behavior is normal  Judgment and thought content normal    Nursing note and vitals reviewed          Labs:    Lab Results   Component Value Date    WBC 10 87 (H) 06/11/2020    HGB 14 4 06/11/2020    HCT 44 7 06/11/2020    MCV 95 06/11/2020     06/11/2020     Lab Results   Component Value Date    K 4 2 06/11/2020    CL 99 (L) 06/11/2020    CO2 30 06/11/2020    BUN 15 06/11/2020    CREATININE 0 63 06/11/2020    GLUF 97 06/11/2020    CALCIUM 9 2 06/11/2020    AST 19 06/11/2020    ALT 48 06/11/2020    ALKPHOS 71 06/11/2020    EGFR 99 06/11/2020     Lab Results   Component Value Date    CALCIUM 9 2 06/11/2020    K 4 2 06/11/2020    CO2 30 06/11/2020    CL 99 (L) 06/11/2020    BUN 15 06/11/2020 CREATININE 0 63 06/11/2020

## 2021-12-02 ENCOUNTER — TELEPHONE (OUTPATIENT)
Dept: FAMILY MEDICINE CLINIC | Facility: CLINIC | Age: 71
End: 2021-12-02

## 2022-01-11 PROBLEM — Z00.00 ENCOUNTER FOR PREVENTIVE HEALTH EXAMINATION: Status: ACTIVE | Noted: 2022-01-11

## 2022-01-13 ENCOUNTER — APPOINTMENT (OUTPATIENT)
Dept: UROLOGY | Facility: CLINIC | Age: 72
End: 2022-01-13
Payer: MEDICARE

## 2022-01-13 VITALS
HEIGHT: 70 IN | SYSTOLIC BLOOD PRESSURE: 146 MMHG | TEMPERATURE: 97.2 F | WEIGHT: 215 LBS | BODY MASS INDEX: 30.78 KG/M2 | HEART RATE: 68 BPM | DIASTOLIC BLOOD PRESSURE: 82 MMHG

## 2022-01-13 PROCEDURE — 51702 INSERT TEMP BLADDER CATH: CPT

## 2022-01-13 PROCEDURE — 99204 OFFICE O/P NEW MOD 45 MIN: CPT | Mod: 25

## 2022-01-13 NOTE — ASSESSMENT
[FreeTextEntry1] : urinary retention\par elevated PSA\par ED\par voiding trial today\par void 150cc \par PVR r/o retention 132cc\par continue flomax 0.8\par f/u tmorrow repeat PVR\par consider repeat leal\par will need new PSA vs MRI\par considering IPP

## 2022-01-13 NOTE — HISTORY OF PRESENT ILLNESS
[FreeTextEntry1] : 72 y/o male with HTN, HLD, here to establish care for BPH + LUTS and a recent episode of acute urinary retention. \par  Patient was in [facility] 2 months ago when he developed symptoms of acute urinary retention. He sough medical care and had an indwelling catheter placed. He returned to the  and followed up with Dr. Mcwilliams.. at [facility]. He was then scheduled for TURP 12/23 which was canceled d/t surgeon's illness. Catheter was changed 1/3/2021. \par \par Prior to this episode of retention, pt has had urinary frequency for ?? years, treated with Tamsulosin. \par Endorses frequency, urgency, nocturia x8, weak stream, incomplete emptying. \par Denies hematuria\par \par PSA  10/27/21 10.5  (this is prior to retention episode)\par biopsy naive \par \par now on flomax 0.8

## 2022-01-14 ENCOUNTER — APPOINTMENT (OUTPATIENT)
Dept: UROLOGY | Facility: CLINIC | Age: 72
End: 2022-01-14
Payer: MEDICARE

## 2022-01-14 VITALS — TEMPERATURE: 97.1 F | HEART RATE: 67 BPM | DIASTOLIC BLOOD PRESSURE: 84 MMHG | SYSTOLIC BLOOD PRESSURE: 135 MMHG

## 2022-01-14 PROCEDURE — 99214 OFFICE O/P EST MOD 30 MIN: CPT

## 2022-01-14 RX ORDER — FINASTERIDE 5 MG/1
5 TABLET, FILM COATED ORAL DAILY
Qty: 90 | Refills: 3 | Status: ACTIVE | COMMUNITY
Start: 2022-01-14 | End: 1900-01-01

## 2022-01-14 NOTE — ASSESSMENT
[FreeTextEntry1] : BPH\par cont flomax 0.8\par stop antiocholinergics\par add finasterdie\par given comorbidites I will hold off on biopsy\par leal was removed yesterday - will check PSA (this will likely be eelvated)\par STRONG interest in IPP\par f/u 1 months\par UA UCX today\par A1c

## 2022-01-14 NOTE — HISTORY OF PRESENT ILLNESS
[FreeTextEntry1] : brings in meds\par was on solifenacin and oxybutynin\par PVR today 293\par voiding q45 minutes\par but much happier without catheter\par +difficulty with control\par on lasix\par PSA 10\par mild SOB\par

## 2022-01-17 LAB — BACTERIA UR CULT: ABNORMAL

## 2022-01-18 DIAGNOSIS — N39.0 URINARY TRACT INFECTION, SITE NOT SPECIFIED: ICD-10-CM

## 2022-01-18 LAB
APPEARANCE: CLEAR
BACTERIA: ABNORMAL
BILIRUBIN URINE: NEGATIVE
BLOOD URINE: ABNORMAL
COLOR: NORMAL
ESTIMATED AVERAGE GLUCOSE: 108 MG/DL
GLUCOSE QUALITATIVE U: NEGATIVE
HBA1C MFR BLD HPLC: 5.4 %
HYALINE CASTS: 0 /LPF
KETONES URINE: NEGATIVE
LEUKOCYTE ESTERASE URINE: ABNORMAL
MICROSCOPIC-UA: NORMAL
NITRITE URINE: POSITIVE
PH URINE: 6
PROTEIN URINE: NEGATIVE
PSA SERPL-MCNC: 24.4 NG/ML
RED BLOOD CELLS URINE: 13 /HPF
SPECIFIC GRAVITY URINE: 1.02
SQUAMOUS EPITHELIAL CELLS: 2 /HPF
UROBILINOGEN URINE: NORMAL
WHITE BLOOD CELLS URINE: 111 /HPF

## 2022-01-18 RX ORDER — SULFAMETHOXAZOLE AND TRIMETHOPRIM 800; 160 MG/1; MG/1
800-160 TABLET ORAL
Qty: 28 | Refills: 0 | Status: ACTIVE | COMMUNITY
Start: 2022-01-18 | End: 1900-01-01

## 2022-04-28 ENCOUNTER — APPOINTMENT (OUTPATIENT)
Dept: UROLOGY | Facility: CLINIC | Age: 72
End: 2022-04-28
Payer: MEDICARE

## 2022-04-28 ENCOUNTER — LABORATORY RESULT (OUTPATIENT)
Age: 72
End: 2022-04-28

## 2022-04-28 VITALS — SYSTOLIC BLOOD PRESSURE: 146 MMHG | DIASTOLIC BLOOD PRESSURE: 81 MMHG | TEMPERATURE: 97.7 F | HEART RATE: 63 BPM

## 2022-04-28 DIAGNOSIS — N40.1 BENIGN PROSTATIC HYPERPLASIA WITH LOWER URINARY TRACT SYMPMS: ICD-10-CM

## 2022-04-28 DIAGNOSIS — N13.8 BENIGN PROSTATIC HYPERPLASIA WITH LOWER URINARY TRACT SYMPMS: ICD-10-CM

## 2022-04-28 PROCEDURE — 99214 OFFICE O/P EST MOD 30 MIN: CPT

## 2022-04-28 NOTE — HISTORY OF PRESENT ILLNESS
[Currently Experiencing ___] :  [unfilled] [Urinary Urgency] : urinary urgency [Urinary Frequency] : urinary frequency [Nocturia] : nocturia [Weak Stream] : weak stream [Post-Void Dribbling] : post-void dribbling [None] : None [FreeTextEntry1] : 71 y/o male with hx of  HTN, HLD, ED,   BPH + LUTS, previous urinary retention. \par Returns for follow up \par Complains of urgency,  frequency q1hr , Nocturia X7 \par bothersome symptoms - no improvement \par Currently on Flomax 0.8 mg  and Finasteride 5 mg \par PSA 24.40 Jan 18, 2022\par         10.5 Oct 27, 2021\par A1c 5.4 Jan 2022\par Cancelled TURP 12/23/2021 at [facility] \par Failed PDE5 medication for ED\par denies dysuria, hematuria, flank pain \par

## 2022-04-28 NOTE — PHYSICAL EXAM
[General Appearance - Well Developed] : well developed [General Appearance - Well Nourished] : well nourished [Normal Appearance] : normal appearance [Well Groomed] : well groomed [General Appearance - In No Acute Distress] : no acute distress [Abdomen Soft] : soft [Abdomen Tenderness] : non-tender [Costovertebral Angle Tenderness] : no ~M costovertebral angle tenderness [Urethral Meatus] : meatus normal [Urinary Bladder Findings] : the bladder was normal on palpation [Scrotum] : the scrotum was normal [Testes Mass (___cm)] : there were no testicular masses [] : no rash [Edema] : no peripheral edema [Exaggerated Use Of Accessory Muscles For Inspiration] : no accessory muscle use [Oriented To Time, Place, And Person] : oriented to person, place, and time [Affect] : the affect was normal [Mood] : the mood was normal [Not Anxious] : not anxious [Normal Station and Gait] : the gait and station were normal for the patient's age [No Focal Deficits] : no focal deficits [No Palpable Adenopathy] : no palpable adenopathy [FreeTextEntry1] : Mild SOB

## 2022-04-28 NOTE — ASSESSMENT
[FreeTextEntry1] : BPH with LUTS\par Reviewed treatment options\par previously schedule for TURP in 12/23/2021 at  [facility] but was cancelled \par Failed medical therapy - tamsulosin 0.8 and Finasteride 5 mg \par PSA 24.40 Jan 18, 2022\par A1c 5.4 Jan 2022\par PVR today - r/o urinary retention = 102 ml \par \par Erectile Dysfunction\par Discussed treatment options, \par failed PDE5, no interest in ICI\par Strong interest in IPP, advised to hold IPP given worsening LUTS. \par \par Labs: Preop\par PSA . \par CBC, BMP, PT/INR, PTT\par UA, UC, CXR \par \par Follow up cystoscopy and Transrectal Ultrasound \par \par  Aakash ID# [id number]

## 2022-04-29 LAB
ANION GAP SERPL CALC-SCNC: 14 MMOL/L
APPEARANCE: CLEAR
APTT BLD: 29.6 SEC
BASOPHILS # BLD AUTO: 0.04 K/UL
BASOPHILS NFR BLD AUTO: 0.5 %
BILIRUBIN URINE: NEGATIVE
BLOOD URINE: ABNORMAL
BUN SERPL-MCNC: 27 MG/DL
CALCIUM SERPL-MCNC: 9.3 MG/DL
CHLORIDE SERPL-SCNC: 106 MMOL/L
CO2 SERPL-SCNC: 18 MMOL/L
COLOR: YELLOW
CREAT SERPL-MCNC: 1.26 MG/DL
EGFR: 60 ML/MIN/1.73M2
EOSINOPHIL # BLD AUTO: 0.28 K/UL
EOSINOPHIL NFR BLD AUTO: 3.3 %
GLUCOSE QUALITATIVE U: NEGATIVE
GLUCOSE SERPL-MCNC: 97 MG/DL
HCT VFR BLD CALC: 34.6 %
HGB BLD-MCNC: 11 G/DL
IMM GRANULOCYTES NFR BLD AUTO: 0.4 %
INR PPP: 1.01 RATIO
KETONES URINE: NORMAL
LEUKOCYTE ESTERASE URINE: NEGATIVE
LYMPHOCYTES # BLD AUTO: 1.82 K/UL
LYMPHOCYTES NFR BLD AUTO: 21.6 %
MAN DIFF?: NORMAL
MCHC RBC-ENTMCNC: 29.6 PG
MCHC RBC-ENTMCNC: 31.8 GM/DL
MCV RBC AUTO: 93 FL
MONOCYTES # BLD AUTO: 0.55 K/UL
MONOCYTES NFR BLD AUTO: 6.5 %
NEUTROPHILS # BLD AUTO: 5.71 K/UL
NEUTROPHILS NFR BLD AUTO: 67.7 %
NITRITE URINE: NEGATIVE
PH URINE: 6
PLATELET # BLD AUTO: 159 K/UL
POTASSIUM SERPL-SCNC: 5.7 MMOL/L
PROTEIN URINE: NORMAL
PSA FREE FLD-MCNC: 25 %
PSA FREE SERPL-MCNC: 20.4 NG/ML
PSA SERPL-MCNC: 80.3 NG/ML
PT BLD: 11.7 SEC
RBC # BLD: 3.72 M/UL
RBC # FLD: 15.9 %
SODIUM SERPL-SCNC: 139 MMOL/L
SPECIFIC GRAVITY URINE: 1.02
UROBILINOGEN URINE: NORMAL
WBC # FLD AUTO: 8.43 K/UL

## 2022-05-02 LAB — BACTERIA UR CULT: ABNORMAL

## 2022-05-19 ENCOUNTER — APPOINTMENT (OUTPATIENT)
Dept: UROLOGY | Facility: CLINIC | Age: 72
End: 2022-05-19

## 2022-05-24 ENCOUNTER — OUTPATIENT (OUTPATIENT)
Dept: OUTPATIENT SERVICES | Facility: HOSPITAL | Age: 72
LOS: 1 days | End: 2022-05-24
Payer: MEDICARE

## 2022-05-24 ENCOUNTER — APPOINTMENT (OUTPATIENT)
Dept: UROLOGY | Facility: CLINIC | Age: 72
End: 2022-05-24
Payer: MEDICARE

## 2022-05-24 VITALS — SYSTOLIC BLOOD PRESSURE: 167 MMHG | TEMPERATURE: 97.6 F | HEART RATE: 76 BPM | DIASTOLIC BLOOD PRESSURE: 91 MMHG

## 2022-05-24 DIAGNOSIS — M47.814 SPONDYLOSIS WITHOUT MYELOPATHY OR RADICULOPATHY, THORACIC REGION: ICD-10-CM

## 2022-05-24 DIAGNOSIS — J98.11 ATELECTASIS: ICD-10-CM

## 2022-05-24 DIAGNOSIS — N40.1 BENIGN PROSTATIC HYPERPLASIA WITH LOWER URINARY TRACT SYMPMS: ICD-10-CM

## 2022-05-24 DIAGNOSIS — R35.1 BENIGN PROSTATIC HYPERPLASIA WITH LOWER URINARY TRACT SYMPMS: ICD-10-CM

## 2022-05-24 DIAGNOSIS — I51.7 CARDIOMEGALY: ICD-10-CM

## 2022-05-24 DIAGNOSIS — Z01.818 ENCOUNTER FOR OTHER PREPROCEDURAL EXAMINATION: ICD-10-CM

## 2022-05-24 PROCEDURE — 71046 X-RAY EXAM CHEST 2 VIEWS: CPT | Mod: 26

## 2022-05-24 PROCEDURE — 71046 X-RAY EXAM CHEST 2 VIEWS: CPT

## 2022-05-24 PROCEDURE — 99214 OFFICE O/P EST MOD 30 MIN: CPT | Mod: 25,57

## 2022-05-24 PROCEDURE — 52000 CYSTOURETHROSCOPY: CPT

## 2022-05-24 PROCEDURE — 76872 US TRANSRECTAL: CPT

## 2022-05-25 ENCOUNTER — NON-APPOINTMENT (OUTPATIENT)
Age: 72
End: 2022-05-25

## 2022-05-25 ENCOUNTER — APPOINTMENT (OUTPATIENT)
Dept: HEART AND VASCULAR | Facility: CLINIC | Age: 72
End: 2022-05-25
Payer: MEDICARE

## 2022-05-25 VITALS
SYSTOLIC BLOOD PRESSURE: 112 MMHG | HEIGHT: 70 IN | DIASTOLIC BLOOD PRESSURE: 69 MMHG | BODY MASS INDEX: 30.78 KG/M2 | OXYGEN SATURATION: 98 % | TEMPERATURE: 97.2 F | WEIGHT: 215 LBS | HEART RATE: 78 BPM

## 2022-05-25 DIAGNOSIS — Z79.2 LONG TERM (CURRENT) USE OF ANTIBIOTICS: ICD-10-CM

## 2022-05-25 DIAGNOSIS — E78.49 OTHER HYPERLIPIDEMIA: ICD-10-CM

## 2022-05-25 DIAGNOSIS — Z87.39 PERSONAL HISTORY OF OTHER DISEASES OF THE MUSCULOSKELETAL SYSTEM AND CONNECTIVE TISSUE: ICD-10-CM

## 2022-05-25 DIAGNOSIS — I10 ESSENTIAL (PRIMARY) HYPERTENSION: ICD-10-CM

## 2022-05-25 LAB
ANION GAP SERPL CALC-SCNC: 15 MMOL/L
APPEARANCE: ABNORMAL
APTT BLD: 28.8 SEC
BACTERIA: NEGATIVE
BASOPHILS # BLD AUTO: 0.04 K/UL
BASOPHILS NFR BLD AUTO: 0.4 %
BILIRUBIN URINE: NEGATIVE
BLOOD URINE: ABNORMAL
BUN SERPL-MCNC: 21 MG/DL
CALCIUM OXALATE CRYSTALS: ABNORMAL
CALCIUM SERPL-MCNC: 9.4 MG/DL
CHLORIDE SERPL-SCNC: 103 MMOL/L
CO2 SERPL-SCNC: 20 MMOL/L
COLOR: ABNORMAL
CREAT SERPL-MCNC: 1.24 MG/DL
EGFR: 61 ML/MIN/1.73M2
EOSINOPHIL # BLD AUTO: 0.11 K/UL
EOSINOPHIL NFR BLD AUTO: 1.2 %
GLUCOSE QUALITATIVE U: NEGATIVE
GLUCOSE SERPL-MCNC: 93 MG/DL
HCT VFR BLD CALC: 34.6 %
HGB BLD-MCNC: 11 G/DL
HYALINE CASTS: 0 /LPF
IMM GRANULOCYTES NFR BLD AUTO: 0.3 %
INR PPP: 1.06 RATIO
KETONES URINE: NEGATIVE
LEUKOCYTE ESTERASE URINE: ABNORMAL
LYMPHOCYTES # BLD AUTO: 2.69 K/UL
LYMPHOCYTES NFR BLD AUTO: 28.7 %
MAN DIFF?: NORMAL
MCHC RBC-ENTMCNC: 29.6 PG
MCHC RBC-ENTMCNC: 31.8 GM/DL
MCV RBC AUTO: 93 FL
MICROSCOPIC-UA: NORMAL
MONOCYTES # BLD AUTO: 0.67 K/UL
MONOCYTES NFR BLD AUTO: 7.2 %
NEUTROPHILS # BLD AUTO: 5.82 K/UL
NEUTROPHILS NFR BLD AUTO: 62.2 %
NITRITE URINE: NEGATIVE
PH URINE: 5.5
PLATELET # BLD AUTO: 153 K/UL
POTASSIUM SERPL-SCNC: 4.8 MMOL/L
PROTEIN URINE: NORMAL
PT BLD: 12.5 SEC
RBC # BLD: 3.72 M/UL
RBC # FLD: 15.8 %
RED BLOOD CELLS URINE: >720 /HPF
SODIUM SERPL-SCNC: 138 MMOL/L
SPECIFIC GRAVITY URINE: 1.01
SQUAMOUS EPITHELIAL CELLS: 8 /HPF
UROBILINOGEN URINE: NORMAL
WBC # FLD AUTO: 9.36 K/UL
WHITE BLOOD CELLS URINE: 15 /HPF

## 2022-05-25 PROCEDURE — 99204 OFFICE O/P NEW MOD 45 MIN: CPT | Mod: 25

## 2022-05-25 PROCEDURE — 93000 ELECTROCARDIOGRAM COMPLETE: CPT

## 2022-05-25 RX ORDER — FUROSEMIDE 20 MG/1
20 TABLET ORAL
Refills: 0 | Status: ACTIVE | COMMUNITY

## 2022-05-25 RX ORDER — SULFAMETHOXAZOLE AND TRIMETHOPRIM 800; 160 MG/1; MG/1
800-160 TABLET ORAL TWICE DAILY
Qty: 14 | Refills: 0 | Status: ACTIVE | COMMUNITY
Start: 2022-05-25 | End: 1900-01-01

## 2022-05-25 RX ORDER — ALLOPURINOL 100 MG/1
100 TABLET ORAL
Refills: 0 | Status: ACTIVE | COMMUNITY

## 2022-05-25 RX ORDER — SIMVASTATIN 20 MG/1
20 TABLET, FILM COATED ORAL
Refills: 0 | Status: ACTIVE | COMMUNITY

## 2022-05-25 NOTE — DISCUSSION/SUMMARY
[FreeTextEntry1] : EKG: Normal Sinus  Rhythm 70/min\par \par 1. Worsening TORRES - possible impaired relaxation vs ischemia - plan for CCTA/TTE\par 2. HLD - cont simvastatin\par 3. HTN - cont meds, BP diary\par 4. RTC 1 month

## 2022-05-25 NOTE — HISTORY OF PRESENT ILLNESS
[FreeTextEntry1] : cysto today\par previous history urinary retention\par significant urinary bother\par strong interest in IPP\par here to discuss

## 2022-05-25 NOTE — ASSESSMENT
[FreeTextEntry1] : BPH/retention\par ED\par strongly recommend treating BPH complaint first\par The risks and benefits of transurethral resection of the prostate were discussed at length today. Given his symptoms and exam findings, I told him he has an approximately 70% likelihood of improvement in his LUTS/urinary retention in the postoperative period. He understands that this means that some men do not improve after surgery. The role of postop catheterization, and the potential for a longer term leal were discussed at length. The risk of urinary tract infection and possible sepsis along with its associated sequelae were discussed. The very low risk of urinary incontinence as well as bladder overactivity were discussed. The risk of gross hematuria, and the fact that he cannot perform strenous activity for one month were also reported. He understands that he is likely to have retrograde ejaculation following this procedure.  He opts to proceed.\par \par for IPP once recovered from TUR

## 2022-05-25 NOTE — PHYSICAL EXAM
[Well Developed] : well developed [Normal Conjunctiva] : normal conjunctiva [Normal Venous Pressure] : normal venous pressure [Normal S1, S2] : normal S1, S2 [No Murmur] : no murmur [Clear Lung Fields] : clear lung fields [Good Air Entry] : good air entry [Soft] : abdomen soft [Non Tender] : non-tender [Normal Gait] : normal gait [No Edema] : no edema [No Rash] : no rash [Moves all extremities] : moves all extremities [Alert and Oriented] : alert and oriented

## 2022-05-25 NOTE — HISTORY OF PRESENT ILLNESS
[FreeTextEntry1] : Patient comes for evaluation of TORRES. TORRES after 1-2 blocks, started recently (< 2months). Pt has hx of HLD and HTN.

## 2022-05-25 NOTE — REVIEW OF SYSTEMS
[Fever] : no fever [Blurry Vision] : no blurred vision [Earache] : no earache [SOB] : no shortness of breath [Dyspnea on exertion] : dyspnea during exertion [Chest Discomfort] : no chest discomfort [Leg Claudication] : no intermittent leg claudication [Cough] : no cough [Abdominal Pain] : no abdominal pain [Urinary Frequency] : no change in urinary frequency [Joint Pain] : no joint pain [Rash] : no rash [Dizziness] : no dizziness [Confusion] : no confusion was observed [Easy Bleeding] : no tendency for easy bleeding

## 2022-05-26 LAB — BACTERIA UR CULT: ABNORMAL

## 2022-06-01 ENCOUNTER — APPOINTMENT (OUTPATIENT)
Dept: PULMONOLOGY | Facility: CLINIC | Age: 72
End: 2022-06-01
Payer: MEDICARE

## 2022-06-01 VITALS
WEIGHT: 213 LBS | HEIGHT: 70 IN | BODY MASS INDEX: 30.49 KG/M2 | SYSTOLIC BLOOD PRESSURE: 125 MMHG | HEART RATE: 61 BPM | TEMPERATURE: 97.2 F | OXYGEN SATURATION: 99 % | DIASTOLIC BLOOD PRESSURE: 77 MMHG

## 2022-06-01 DIAGNOSIS — Z01.811 ENCOUNTER FOR PREPROCEDURAL RESPIRATORY EXAMINATION: ICD-10-CM

## 2022-06-01 PROCEDURE — 99204 OFFICE O/P NEW MOD 45 MIN: CPT

## 2022-06-01 NOTE — PHYSICAL EXAM
[No Acute Distress] : no acute distress [IV] : Mallampati Class: IV [Normal Rate/Rhythm] : normal rate/rhythm [Normal S1, S2] : normal s1, s2 [No Murmurs] : no murmurs [No Resp Distress] : no resp distress [Clear to Auscultation Bilaterally] : clear to auscultation bilaterally [Gait - Sufficient For Exercise Testing] : gait sufficient for exercise testing [No Clubbing] : no clubbing [No Edema] : no edema

## 2022-06-02 ENCOUNTER — APPOINTMENT (OUTPATIENT)
Dept: HEART AND VASCULAR | Facility: CLINIC | Age: 72
End: 2022-06-02

## 2022-06-02 LAB — SARS-COV-2 N GENE NPH QL NAA+PROBE: NOT DETECTED

## 2022-06-03 ENCOUNTER — OUTPATIENT (OUTPATIENT)
Dept: OUTPATIENT SERVICES | Facility: HOSPITAL | Age: 72
LOS: 1 days | End: 2022-06-03
Payer: MEDICARE

## 2022-06-03 ENCOUNTER — APPOINTMENT (OUTPATIENT)
Dept: CT IMAGING | Facility: HOSPITAL | Age: 72
End: 2022-06-03

## 2022-06-03 LAB — POCT ISTAT CREATININE: 1.5 MG/DL — HIGH (ref 0.5–1.3)

## 2022-06-03 PROCEDURE — G1004: CPT

## 2022-06-03 PROCEDURE — 75574 CT ANGIO HRT W/3D IMAGE: CPT | Mod: 26,ME

## 2022-06-03 PROCEDURE — 82565 ASSAY OF CREATININE: CPT

## 2022-06-03 PROCEDURE — 75574 CT ANGIO HRT W/3D IMAGE: CPT | Mod: ME

## 2022-06-06 ENCOUNTER — APPOINTMENT (OUTPATIENT)
Dept: PULMONOLOGY | Facility: CLINIC | Age: 72
End: 2022-06-06
Payer: MEDICARE

## 2022-06-06 PROCEDURE — 94727 GAS DIL/WSHOT DETER LNG VOL: CPT

## 2022-06-06 PROCEDURE — 94060 EVALUATION OF WHEEZING: CPT

## 2022-06-06 PROCEDURE — 94729 DIFFUSING CAPACITY: CPT

## 2022-06-07 NOTE — ASSESSMENT
[FreeTextEntry1] : Data Reviewed: \par cbc no peripheral eosinophilia \par \par XR 5-24-22: cardiomegaly, right basilar focal atelectasis\par \par Impression/Plan:\par 1. Dyspnea on exertion\par 2. Pre op optimization\par \par - will get PFT\par - ECHO and CCTA pending per cards\par - will review above prio to clerance\par \par 3. Obesity at risk for Barbara\par - EPworth 0\par \par Addendum 6-7-22\par PFT 6-6-22: normal\par - ARISCAT score is 19  which puts him at an intermediate risk for in hospital post op pulmonary complications (composite including respiratory failure, respiratory infections, pleural effusion, atelectasis, pneumothorax, bronchospasm and aspiration pneumonitis). He is optimized from a pulmonary standpoint for planned procedure. Would ensure early mobility, pain control, incentive spirometry and DVT ppx when deemed appropriate by the surgical team.\par \par

## 2022-06-07 NOTE — CONSULT LETTER
[Dear  ___] : Dear  [unfilled], [Consult Letter:] : I had the pleasure of evaluating your patient, [unfilled]. [Please see my note below.] : Please see my note below. [Consult Closing:] : Thank you very much for allowing me to participate in the care of this patient.  If you have any questions, please do not hesitate to contact me. [Sincerely,] : Sincerely, [DrLida  ___] : Dr. RUEDA [FreeTextEntry3] : Dr. Hdez

## 2022-06-07 NOTE — REASON FOR VISIT
[Initial] : an initial visit [Shortness of Breath] : shortness of breath [Pre-op Risk Stratification] : pre-op risk stratification [TextBox_13] : Dr. Millan

## 2022-06-07 NOTE — REVIEW OF SYSTEMS
[SOB on Exertion] : sob on exertion [Obesity] : obesity [Negative] : Gastrointestinal [Cough] : no cough [Sputum] : no sputum [Headache] : no headache [Dizziness] : no dizziness

## 2022-06-08 ENCOUNTER — APPOINTMENT (OUTPATIENT)
Dept: UROLOGY | Facility: AMBULATORY SURGERY CENTER | Age: 72
End: 2022-06-08

## 2022-06-09 VITALS
HEART RATE: 59 BPM | SYSTOLIC BLOOD PRESSURE: 149 MMHG | TEMPERATURE: 98 F | OXYGEN SATURATION: 100 % | RESPIRATION RATE: 18 BRPM | HEIGHT: 70 IN | WEIGHT: 212.97 LBS | DIASTOLIC BLOOD PRESSURE: 70 MMHG

## 2022-06-09 RX ORDER — CHLORHEXIDINE GLUCONATE 213 G/1000ML
1 SOLUTION TOPICAL ONCE
Refills: 0 | Status: DISCONTINUED | OUTPATIENT
Start: 2022-06-14 | End: 2022-06-28

## 2022-06-09 NOTE — H&P ADULT - MUSCULOSKELETAL
normal/ROM intact/normal gait/strength 5/5 bilateral upper extremities/strength 5/5 bilateral lower extremities

## 2022-06-09 NOTE — H&P ADULT - ADDITIONAL PE
ASA: II  Mallampati: II   12 Lead EKG PENDING ASA: II  Mallampati: II   12 Lead EKG: Sinus Bradycardia @ 59 BPM, without ischemia

## 2022-06-09 NOTE — H&P ADULT - ASSESSMENT
71 y/o Czech speaking Male w/ PMHx of HTN, HLD, BPH and gout presents for cardiac catheterization w/ possible intervention if clinically indicated due to patient's risk factors, abnormal CCTA results, and CCS class 3 anginal/anginal-equivalent symptoms.     Risks & benefits of procedure and alternative therapy have been explained to the patient including but not limited to: allergic reaction, bleeding w/possible need for blood transfusion, infection, renal and vascular compromise, limb damage, arrhythmia, stroke, vessel dissection/perforation, Myocardial infarction, emergent CABG. Informed consent obtained and in chart.     -H/H: ____. Pt denies BRBPR, hematuria, hematochezia, melena. Pt loaded 325mg ASA x1 and Plavix 600mg x1  -Cr: _____. EF normal. Euvolemic on exam.   bolus x one given per protocol followed by NS @ 75 cc/hour x two hours   -DM: Insulin Sliding Scale Coverage ordered  -Cardiac Catheterization ordered placed   -Home Medication Confirmed via: Medication bottles at bedside   -Type of sedation: Moderate  -Candidate for sedation: Yes  73 y/o Mohawk speaking Male w/ PMHx of HTN, HLD, BPH and gout presents for cardiac catheterization w/ possible intervention if clinically indicated due to patient's risk factors, abnormal CCTA results, and CCS class 3 anginal/anginal-equivalent symptoms.     Risks & benefits of procedure and alternative therapy have been explained to the patient including but not limited to: allergic reaction, bleeding w/possible need for blood transfusion, infection, renal and vascular compromise, limb damage, arrhythmia, stroke, vessel dissection/perforation, Myocardial infarction, emergent CABG. Informed consent obtained and in chart.     -H/H: 11.1/33.2  Pt denies BRBPR, hematochezia, melena. Does have intermittent hematuria which Dr. Joy aware of. Patient takes ASA 81 mg daily; will give ASA 81 mg x one dose prior to procedure. Per discussion with Dr. Joy; given CR 1.8 and CCTA findings;  no Plavix load given; will be diagnostic case only.  bolus x one given per protocol followed by NS @ 100 cc/hour x two hours.  -BUN/CR: 28/1.81 today (5/24/2022: CR: 1.24); Reviewed with Dr. Joy; has history of BPH (with hematuria--last episode 15 days ago); Takes Lasix 20 mg PO daily at home (did not take today).  bolus x one dose followed by NS @ 100 cc/hour.   -Cardiac Catheterization ordered placed   -Home Medication Confirmed via: patient (able to recall each name and dose of medication).   -Type of sedation: Moderate  -Candidate for sedation: Yes

## 2022-06-09 NOTE — H&P ADULT - HISTORY OF PRESENT ILLNESS
COVID:  Pharmacy:  Escort:  Cardiologist:    *Confirm meds on arrival COVID:  Pharmacy:  Escort:  Cardiologist:    *Confirm meds on arrival  71 y/o Turkish speaking Male w/ PMHx of HTN, HLD, BPH and gout who initially presented to Cardiologist Dr. Joy c/o BRIAN after mild exertion (walking 1-2 blocks). Pt denies chest pain, palpitations, dizziness, LOC, N/V/D, fever/chills/sick contact, diaphoresis, orthopnea/PND, and leg swelling. CXR: Cardiomegaly, otherwise unremarkable. CCTA 6/3/22: Ca score 1181 Agatston units (94th percentile adjusted), mod OM1 stenosis (large caliber vessel), mild-mod mLAD stenosis w/ positive remodeling, mild stenosis of D2, mLCx, RI, dRCA, and RPDA, and motion articact and calcific plaque preclude assessment of dLAD and small caliber OM2; Non-Cardiac: Small hiatal hernia, degenerative changes to thoracic/lumber spine, and multiple solid subcenimeter and micronodules largest measuring 8mm in RLL (short interval surveillance CT in 3-6 months recommended).  In light of patient's risk factors, abnormal CCTA results, and CCS class 3 anginal/anginal-equivalent symptoms, patient is referred to [facility] for cardiac catheterization w/ possible intervention if clinically indicated. COVID:  Pharmacy:  Escort:  Cardiologist: Dr. Joy      73 y/o Central African speaking Male w/ PMHx of HTN, HLD, BPH and gout who initially presented to Cardiologist Dr. Joy c/o TORRES after mild exertion (walking 1-2 blocks). Pt denies chest pain, palpitations, dizziness, LOC, N/V/D, fever/chills/sick contact, diaphoresis, orthopnea/PND, and leg swelling. CXR: Cardiomegaly, otherwise unremarkable. CCTA 6/3/22: Ca score 1181 Agatston units (94th percentile adjusted), mod OM1 stenosis (large caliber vessel), mild-mod mLAD stenosis w/ positive remodeling, mild stenosis of D2, mLCx, RI, dRCA, and RPDA, and motion artifact and calcific plaque preclude assessment of dLAD and small caliber OM2; Non-Cardiac: Small hiatal hernia, degenerative changes to thoracic/lumber spine, and multiple solid subcenimeter and micronodules largest measuring 8mm in RLL (short interval surveillance CT in 3-6 months recommended). In light of patient's risk factors, abnormal CCTA results, and CCS class 3 anginal/anginal-equivalent symptoms, patient is referred to [facility] for cardiac catheterization w/ possible intervention if clinically indicated. COVID-19 PCR: Negative 6/11/2022 (in Chart; from [facility])  Pharmacy: [facility] (Zip Code: [id number])  Escort: Niece  Cardiologist: Dr. Joy      73 y/o Thai speaking Male w/ PMHx of HTN, HLD, BPH and gout who initially presented to Cardiologist Dr. Joy c/o TORRES after mild exertion (walking 1-2 blocks). Pt denies chest pain, palpitations, dizziness, LOC, N/V/D, fever/chills/sick contact, diaphoresis, orthopnea/PND, and leg swelling. CXR: Cardiomegaly, otherwise unremarkable. CCTA 6/3/22: Ca score 1181 Agatston units (94th percentile adjusted), mod OM1 stenosis (large caliber vessel), mild-mod mLAD stenosis w/ positive remodeling, mild stenosis of D2, mLCx, RI, dRCA, and RPDA, and motion artifact and calcific plaque preclude assessment of dLAD and small caliber OM2; Non-Cardiac: Small hiatal hernia, degenerative changes to thoracic/lumber spine, and multiple solid subcenimeter and micronodules largest measuring 8mm in RLL (short interval surveillance CT in 3-6 months recommended). Of note; Patient reports he was initially scheduled for TURP last week due to periodic hematuria but due to need for cardiac clearance this surgery has been postponed. Last episode of hematuria was 15 days ago; reports adherence to ASA 81 mg daily.  In light of patient's risk factors, abnormal CCTA results, and CCS class 3 anginal/anginal-equivalent symptoms, patient is referred to [facility] for cardiac catheterization w/ possible intervention if clinically indicated.

## 2022-06-14 ENCOUNTER — OUTPATIENT (OUTPATIENT)
Dept: OUTPATIENT SERVICES | Facility: HOSPITAL | Age: 72
LOS: 1 days | Discharge: HOME | End: 2022-06-14
Payer: MEDICARE

## 2022-06-14 LAB
A1C WITH ESTIMATED AVERAGE GLUCOSE RESULT: 5.5 % — SIGNIFICANT CHANGE UP (ref 4–5.6)
ANION GAP SERPL CALC-SCNC: 13 MMOL/L — SIGNIFICANT CHANGE UP (ref 5–17)
APTT BLD: 26.9 SEC — LOW (ref 27.5–35.5)
BASOPHILS # BLD AUTO: 0.03 K/UL — SIGNIFICANT CHANGE UP (ref 0–0.2)
BASOPHILS NFR BLD AUTO: 0.4 % — SIGNIFICANT CHANGE UP (ref 0–2)
BUN SERPL-MCNC: 32 MG/DL — HIGH (ref 7–23)
CALCIUM SERPL-MCNC: 9.6 MG/DL — SIGNIFICANT CHANGE UP (ref 8.4–10.5)
CHLORIDE SERPL-SCNC: 101 MMOL/L — SIGNIFICANT CHANGE UP (ref 96–108)
CHOLEST SERPL-MCNC: 136 MG/DL — SIGNIFICANT CHANGE UP
CO2 SERPL-SCNC: 18 MMOL/L — LOW (ref 22–31)
CREAT SERPL-MCNC: 1.81 MG/DL — HIGH (ref 0.5–1.3)
EGFR: 39 ML/MIN/1.73M2 — LOW
EOSINOPHIL # BLD AUTO: 0.12 K/UL — SIGNIFICANT CHANGE UP (ref 0–0.5)
EOSINOPHIL NFR BLD AUTO: 1.5 % — SIGNIFICANT CHANGE UP (ref 0–6)
ESTIMATED AVERAGE GLUCOSE: 111 MG/DL — SIGNIFICANT CHANGE UP (ref 68–114)
GLUCOSE SERPL-MCNC: 106 MG/DL — HIGH (ref 70–99)
HCT VFR BLD CALC: 33.2 % — LOW (ref 39–50)
HDLC SERPL-MCNC: 59 MG/DL — SIGNIFICANT CHANGE UP
HGB BLD-MCNC: 11.1 G/DL — LOW (ref 13–17)
IMM GRANULOCYTES NFR BLD AUTO: 0.2 % — SIGNIFICANT CHANGE UP (ref 0–1.5)
INR BLD: 1.07 — SIGNIFICANT CHANGE UP (ref 0.88–1.16)
ISTAT INR: 0.9 — SIGNIFICANT CHANGE UP (ref 0.88–1.16)
ISTAT PT: 11.1 SEC — SIGNIFICANT CHANGE UP (ref 10–12.9)
LIPID PNL WITH DIRECT LDL SERPL: 63 MG/DL — SIGNIFICANT CHANGE UP
LYMPHOCYTES # BLD AUTO: 2.26 K/UL — SIGNIFICANT CHANGE UP (ref 1–3.3)
LYMPHOCYTES # BLD AUTO: 27.7 % — SIGNIFICANT CHANGE UP (ref 13–44)
MCHC RBC-ENTMCNC: 30.2 PG — SIGNIFICANT CHANGE UP (ref 27–34)
MCHC RBC-ENTMCNC: 33.4 GM/DL — SIGNIFICANT CHANGE UP (ref 32–36)
MCV RBC AUTO: 90.2 FL — SIGNIFICANT CHANGE UP (ref 80–100)
MONOCYTES # BLD AUTO: 0.78 K/UL — SIGNIFICANT CHANGE UP (ref 0–0.9)
MONOCYTES NFR BLD AUTO: 9.6 % — SIGNIFICANT CHANGE UP (ref 2–14)
NEUTROPHILS # BLD AUTO: 4.94 K/UL — SIGNIFICANT CHANGE UP (ref 1.8–7.4)
NEUTROPHILS NFR BLD AUTO: 60.6 % — SIGNIFICANT CHANGE UP (ref 43–77)
NON HDL CHOLESTEROL: 77 MG/DL — SIGNIFICANT CHANGE UP
NRBC # BLD: 0 /100 WBCS — SIGNIFICANT CHANGE UP (ref 0–0)
PLATELET # BLD AUTO: 132 K/UL — LOW (ref 150–400)
POCT ISTAT CREATININE: 1.8 MG/DL — HIGH (ref 0.5–1.3)
POTASSIUM SERPL-MCNC: 4.7 MMOL/L — SIGNIFICANT CHANGE UP (ref 3.5–5.3)
POTASSIUM SERPL-SCNC: 4.7 MMOL/L — SIGNIFICANT CHANGE UP (ref 3.5–5.3)
PROTHROM AB SERPL-ACNC: 12.7 SEC — SIGNIFICANT CHANGE UP (ref 10.5–13.4)
RBC # BLD: 3.68 M/UL — LOW (ref 4.2–5.8)
RBC # FLD: 15.5 % — HIGH (ref 10.3–14.5)
SODIUM SERPL-SCNC: 132 MMOL/L — LOW (ref 135–145)
TRIGL SERPL-MCNC: 70 MG/DL — SIGNIFICANT CHANGE UP
WBC # BLD: 8.15 K/UL — SIGNIFICANT CHANGE UP (ref 3.8–10.5)
WBC # FLD AUTO: 8.15 K/UL — SIGNIFICANT CHANGE UP (ref 3.8–10.5)

## 2022-06-14 PROCEDURE — 85730 THROMBOPLASTIN TIME PARTIAL: CPT

## 2022-06-14 PROCEDURE — 93010 ELECTROCARDIOGRAM REPORT: CPT

## 2022-06-14 PROCEDURE — C1894: CPT

## 2022-06-14 PROCEDURE — 82565 ASSAY OF CREATININE: CPT

## 2022-06-14 PROCEDURE — 93458 L HRT ARTERY/VENTRICLE ANGIO: CPT

## 2022-06-14 PROCEDURE — 83036 HEMOGLOBIN GLYCOSYLATED A1C: CPT

## 2022-06-14 PROCEDURE — 80048 BASIC METABOLIC PNL TOTAL CA: CPT

## 2022-06-14 PROCEDURE — 85610 PROTHROMBIN TIME: CPT

## 2022-06-14 PROCEDURE — 80061 LIPID PANEL: CPT

## 2022-06-14 PROCEDURE — C1769: CPT

## 2022-06-14 PROCEDURE — C1887: CPT

## 2022-06-14 PROCEDURE — 93458 L HRT ARTERY/VENTRICLE ANGIO: CPT | Mod: 26

## 2022-06-14 PROCEDURE — 85025 COMPLETE CBC W/AUTO DIFF WBC: CPT

## 2022-06-14 PROCEDURE — 93005 ELECTROCARDIOGRAM TRACING: CPT

## 2022-06-14 RX ORDER — SODIUM CHLORIDE 9 MG/ML
250 INJECTION INTRAMUSCULAR; INTRAVENOUS; SUBCUTANEOUS ONCE
Refills: 0 | Status: COMPLETED | OUTPATIENT
Start: 2022-06-14 | End: 2022-06-14

## 2022-06-14 RX ORDER — ALLOPURINOL 300 MG
1 TABLET ORAL
Qty: 0 | Refills: 0 | DISCHARGE

## 2022-06-14 RX ORDER — ASPIRIN/CALCIUM CARB/MAGNESIUM 324 MG
81 TABLET ORAL ONCE
Refills: 0 | Status: COMPLETED | OUTPATIENT
Start: 2022-06-14 | End: 2022-06-14

## 2022-06-14 RX ORDER — FUROSEMIDE 40 MG
1 TABLET ORAL
Qty: 0 | Refills: 0 | DISCHARGE

## 2022-06-14 RX ORDER — FINASTERIDE 5 MG/1
1 TABLET, FILM COATED ORAL
Qty: 0 | Refills: 0 | DISCHARGE

## 2022-06-14 RX ORDER — SODIUM CHLORIDE 9 MG/ML
500 INJECTION INTRAMUSCULAR; INTRAVENOUS; SUBCUTANEOUS
Refills: 0 | Status: DISCONTINUED | OUTPATIENT
Start: 2022-06-14 | End: 2022-06-28

## 2022-06-14 RX ORDER — SODIUM CHLORIDE 9 MG/ML
500 INJECTION INTRAMUSCULAR; INTRAVENOUS; SUBCUTANEOUS
Refills: 0 | Status: DISCONTINUED | OUTPATIENT
Start: 2022-06-14 | End: 2022-06-14

## 2022-06-14 RX ORDER — CLOPIDOGREL BISULFATE 75 MG/1
600 TABLET, FILM COATED ORAL ONCE
Refills: 0 | Status: DISCONTINUED | OUTPATIENT
Start: 2022-06-14 | End: 2022-06-14

## 2022-06-14 RX ORDER — METOPROLOL TARTRATE 50 MG
1 TABLET ORAL
Qty: 0 | Refills: 0 | DISCHARGE

## 2022-06-14 RX ORDER — SIMVASTATIN 20 MG/1
1 TABLET, FILM COATED ORAL
Qty: 0 | Refills: 0 | DISCHARGE

## 2022-06-14 RX ORDER — DICLOFENAC SODIUM 75 MG/1
1 TABLET, DELAYED RELEASE ORAL
Qty: 0 | Refills: 0 | DISCHARGE

## 2022-06-14 RX ORDER — ASPIRIN/CALCIUM CARB/MAGNESIUM 324 MG
1 TABLET ORAL
Qty: 0 | Refills: 0 | DISCHARGE

## 2022-06-14 RX ADMIN — Medication 81 MILLIGRAM(S): at 08:47

## 2022-06-14 RX ADMIN — SODIUM CHLORIDE 75 MILLILITER(S): 9 INJECTION INTRAMUSCULAR; INTRAVENOUS; SUBCUTANEOUS at 10:59

## 2022-06-14 RX ADMIN — SODIUM CHLORIDE 100 MILLILITER(S): 9 INJECTION INTRAMUSCULAR; INTRAVENOUS; SUBCUTANEOUS at 08:48

## 2022-06-14 RX ADMIN — SODIUM CHLORIDE 500 MILLILITER(S): 9 INJECTION INTRAMUSCULAR; INTRAVENOUS; SUBCUTANEOUS at 08:41

## 2022-06-14 NOTE — PROGRESS NOTE ADULT - SUBJECTIVE AND OBJECTIVE BOX
Interventional Cardiology PA SDA Discharge Note    cardiac cath 6/14/22 via [facility] revealed non obstructive coronaries ( luminal irregularities only    Patient without complaints. Ambulated and voided without difficulties    Afebrile, VSS    Ext:  Right Radial : no   hematoma,  no  bleeding, dressing; C/D/I      Pulses:    intact RAD 2+     A/P:  71 y/o Pashto speaking Male w/ PMHx of HTN, HLD, BPH and gout presents for cardiac catheterization w/ possible intervention if clinically indicated due to patient's risk factors, abnormal CCTA results, and CCS class 3 anginal/anginal-equivalent symptoms.       cardiac cath today revealed non obstructive coronaries ( luminal irregularities only), patient is cleared for TURP. Can stop asa prior to TURP.        1.	Stable for discharge as per attending Dr. Joy  after bed rest, pt voids, groin/wrist stable and 30 minutes of ambulation.  2.	Follow-up with PMD/Cardiologist _Dr Joy  in 1-2 weeks  3.	Discharged forms signed and copies in chart    Interventional Cardiology PA SDA Discharge Note    cardiac cath 6/14/22 via [facility] revealed non obstructive coronaries ( luminal irregularities only), EDP 14mmHg, EF unknown    Patient without complaints. Ambulated and voided without difficulties    Afebrile, VSS    Ext:  Right Radial : no   hematoma,  no  bleeding, dressing; C/D/I      Pulses:    intact RAD 2+     A/P:  71 y/o Yemeni speaking Male w/ PMHx of HTN, HLD, BPH and gout presents for cardiac catheterization w/ possible intervention if clinically indicated due to patient's risk factors, abnormal CCTA results, and CCS class 3 anginal/anginal-equivalent symptoms.     cardiac cath today revealed non obstructive coronaries ( luminal irregularities only), patient is cleared for TURP. Can stop asa prior to TURP.        1.	Stable for discharge as per attending Dr. Joy  after bed rest, pt voids,  wrist stable and 30 minutes of ambulation.  2.	Follow-up with PMD/Cardiologist _Dr Joy  in 1-2 weeks  3.	Discharged forms signed and copies in chart

## 2022-06-17 DIAGNOSIS — R94.39 ABNORMAL RESULT OF OTHER CARDIOVASCULAR FUNCTION STUDY: ICD-10-CM

## 2022-06-17 DIAGNOSIS — I25.118 ATHEROSCLEROTIC HEART DISEASE OF NATIVE CORONARY ARTERY WITH OTHER FORMS OF ANGINA PECTORIS: ICD-10-CM

## 2022-06-29 ENCOUNTER — NON-APPOINTMENT (OUTPATIENT)
Age: 72
End: 2022-06-29

## 2022-06-29 ENCOUNTER — APPOINTMENT (OUTPATIENT)
Dept: HEART AND VASCULAR | Facility: CLINIC | Age: 72
End: 2022-06-29
Payer: MEDICARE

## 2022-06-29 ENCOUNTER — APPOINTMENT (OUTPATIENT)
Dept: HEART AND VASCULAR | Facility: CLINIC | Age: 72
End: 2022-06-29

## 2022-06-29 VITALS
HEIGHT: 70 IN | HEART RATE: 75 BPM | OXYGEN SATURATION: 98 % | WEIGHT: 209 LBS | SYSTOLIC BLOOD PRESSURE: 103 MMHG | BODY MASS INDEX: 29.92 KG/M2 | DIASTOLIC BLOOD PRESSURE: 67 MMHG | TEMPERATURE: 96.8 F

## 2022-06-29 DIAGNOSIS — R06.00 DYSPNEA, UNSPECIFIED: ICD-10-CM

## 2022-06-29 PROBLEM — M10.9 GOUT, UNSPECIFIED: Chronic | Status: ACTIVE | Noted: 2022-06-14

## 2022-06-29 PROBLEM — I10 ESSENTIAL (PRIMARY) HYPERTENSION: Chronic | Status: ACTIVE | Noted: 2022-06-14

## 2022-06-29 PROBLEM — E78.5 HYPERLIPIDEMIA, UNSPECIFIED: Chronic | Status: ACTIVE | Noted: 2022-06-14

## 2022-06-29 PROBLEM — Z87.438 PERSONAL HISTORY OF OTHER DISEASES OF MALE GENITAL ORGANS: Chronic | Status: ACTIVE | Noted: 2022-06-14

## 2022-06-29 PROCEDURE — 99214 OFFICE O/P EST MOD 30 MIN: CPT | Mod: 25

## 2022-06-29 PROCEDURE — 93306 TTE W/DOPPLER COMPLETE: CPT

## 2022-06-29 PROCEDURE — ZZZZZ: CPT

## 2022-06-29 PROCEDURE — 93000 ELECTROCARDIOGRAM COMPLETE: CPT

## 2022-06-29 RX ORDER — ASPIRIN 81 MG/1
81 TABLET ORAL DAILY
Qty: 90 | Refills: 3 | Status: ACTIVE | COMMUNITY
Start: 2022-06-29 | End: 1900-01-01

## 2022-06-29 NOTE — REVIEW OF SYSTEMS
[Dyspnea on exertion] : dyspnea during exertion [Fever] : no fever [Blurry Vision] : no blurred vision [Earache] : no earache [SOB] : no shortness of breath [Chest Discomfort] : no chest discomfort [Leg Claudication] : no intermittent leg claudication [Cough] : no cough [Abdominal Pain] : no abdominal pain [Urinary Frequency] : no change in urinary frequency [Joint Pain] : no joint pain [Rash] : no rash [Dizziness] : no dizziness [Confusion] : no confusion was observed [Easy Bleeding] : no tendency for easy bleeding

## 2022-06-29 NOTE — HISTORY OF PRESENT ILLNESS
[FreeTextEntry1] : Patient comes for evaluation of TORRES. s/p LHC - 1V CAD - Med Rx. Pt has hx of HLD and HTN.

## 2022-06-29 NOTE — DISCUSSION/SUMMARY
[FreeTextEntry1] : EKG: Normal Sinus  Rhythm 75/min\par CCTA: CAC 1181, 3V CAD\par Cath: 1V CAD (om1 - 50%)\par ECHO: LV EF normal, mild TR, mild MR, aortic valve sclerosis, no stenosis\par \par 1. TORRES - likely impaired relaxation\par 2. HLD - cont simvastatin\par 3. HTN - cont meds, BP diary\par 4. CAD - cont primary prevention, start ASA 81mg po daily\par 5. RTC 3 months\par \par \par Patient is at low-medium risk for planned prostate surgery from cardiac standpoint. He should continue all his cardiac medications perioperatively. He can stop ASA 5 days prior to the procedure if major bleeding is anticipated.

## 2022-07-27 ENCOUNTER — HOSPITAL ENCOUNTER (INPATIENT)
Facility: HOSPITAL | Age: 72
LOS: 15 days | Discharge: NURSING, REHAB OR LONG-TERM CARE | DRG: 715 | End: 2022-08-12
Attending: EMERGENCY MEDICINE | Admitting: INTERNAL MEDICINE
Payer: MEDICARE

## 2022-07-27 DIAGNOSIS — C61 PROSTATE CANCER METASTATIC TO BONE (HCC): ICD-10-CM

## 2022-07-27 DIAGNOSIS — D62 ACUTE BLOOD LOSS ANEMIA: ICD-10-CM

## 2022-07-27 DIAGNOSIS — R31.9 HEMATURIA: Primary | ICD-10-CM

## 2022-07-27 DIAGNOSIS — C79.9: ICD-10-CM

## 2022-07-27 DIAGNOSIS — D64.9 ANEMIA: ICD-10-CM

## 2022-07-27 DIAGNOSIS — C61 PROSTATE CANCER (HCC): ICD-10-CM

## 2022-07-27 DIAGNOSIS — C79.51 PROSTATE CANCER METASTATIC TO BONE (HCC): ICD-10-CM

## 2022-07-27 DIAGNOSIS — N40.0 BENIGN PROSTATIC HYPERPLASIA WITHOUT LOWER URINARY TRACT SYMPTOMS: ICD-10-CM

## 2022-07-27 DIAGNOSIS — N13.39 OTHER HYDRONEPHROSIS: ICD-10-CM

## 2022-07-27 DIAGNOSIS — C79.51 BONE METASTASIS (HCC): ICD-10-CM

## 2022-07-27 LAB
ALBUMIN SERPL BCP-MCNC: 3.4 G/DL (ref 3.5–5)
ALP SERPL-CCNC: 1044 U/L (ref 46–116)
ALT SERPL W P-5'-P-CCNC: 11 U/L (ref 12–78)
ANION GAP SERPL CALCULATED.3IONS-SCNC: 12 MMOL/L (ref 4–13)
AST SERPL W P-5'-P-CCNC: 41 U/L (ref 5–45)
BASOPHILS # BLD AUTO: 0.03 THOUSANDS/ÂΜL (ref 0–0.1)
BASOPHILS NFR BLD AUTO: 0 % (ref 0–1)
BILIRUB SERPL-MCNC: 0.31 MG/DL (ref 0.2–1)
BUN SERPL-MCNC: 31 MG/DL (ref 5–25)
CALCIUM ALBUM COR SERPL-MCNC: 9.3 MG/DL (ref 8.3–10.1)
CALCIUM SERPL-MCNC: 8.8 MG/DL (ref 8.3–10.1)
CARDIAC TROPONIN I PNL SERPL HS: 7 NG/L
CHLORIDE SERPL-SCNC: 108 MMOL/L (ref 96–108)
CO2 SERPL-SCNC: 19 MMOL/L (ref 21–32)
CREAT SERPL-MCNC: 1.5 MG/DL (ref 0.6–1.3)
EOSINOPHIL # BLD AUTO: 0.18 THOUSAND/ÂΜL (ref 0–0.61)
EOSINOPHIL NFR BLD AUTO: 2 % (ref 0–6)
ERYTHROCYTE [DISTWIDTH] IN BLOOD BY AUTOMATED COUNT: 17.1 % (ref 11.6–15.1)
GFR SERPL CREATININE-BSD FRML MDRD: 45 ML/MIN/1.73SQ M
GLUCOSE SERPL-MCNC: 103 MG/DL (ref 65–140)
HCT VFR BLD AUTO: 20.6 % (ref 36.5–49.3)
HGB BLD-MCNC: 6.6 G/DL (ref 12–17)
IMM GRANULOCYTES # BLD AUTO: 0.05 THOUSAND/UL (ref 0–0.2)
IMM GRANULOCYTES NFR BLD AUTO: 1 % (ref 0–2)
LYMPHOCYTES # BLD AUTO: 2.02 THOUSANDS/ÂΜL (ref 0.6–4.47)
LYMPHOCYTES NFR BLD AUTO: 21 % (ref 14–44)
MCH RBC QN AUTO: 29.1 PG (ref 26.8–34.3)
MCHC RBC AUTO-ENTMCNC: 31.6 G/DL (ref 31.4–37.4)
MCV RBC AUTO: 92 FL (ref 82–98)
MONOCYTES # BLD AUTO: 0.64 THOUSAND/ÂΜL (ref 0.17–1.22)
MONOCYTES NFR BLD AUTO: 7 % (ref 4–12)
NEUTROPHILS # BLD AUTO: 6.5 THOUSANDS/ÂΜL (ref 1.85–7.62)
NEUTS SEG NFR BLD AUTO: 69 % (ref 43–75)
NRBC BLD AUTO-RTO: 0 /100 WBCS
PLATELET # BLD AUTO: 141 THOUSANDS/UL (ref 149–390)
PMV BLD AUTO: 12.8 FL (ref 8.9–12.7)
POTASSIUM SERPL-SCNC: 4.1 MMOL/L (ref 3.5–5.3)
PROT SERPL-MCNC: 6.9 G/DL (ref 6.4–8.4)
RBC # BLD AUTO: 2.23 MILLION/UL (ref 3.88–5.62)
SODIUM SERPL-SCNC: 139 MMOL/L (ref 135–147)
WBC # BLD AUTO: 9.42 THOUSAND/UL (ref 4.31–10.16)

## 2022-07-27 PROCEDURE — 93005 ELECTROCARDIOGRAM TRACING: CPT

## 2022-07-27 PROCEDURE — 36430 TRANSFUSION BLD/BLD COMPNT: CPT

## 2022-07-27 PROCEDURE — 85730 THROMBOPLASTIN TIME PARTIAL: CPT | Performed by: EMERGENCY MEDICINE

## 2022-07-27 PROCEDURE — 85025 COMPLETE CBC W/AUTO DIFF WBC: CPT | Performed by: EMERGENCY MEDICINE

## 2022-07-27 PROCEDURE — 99285 EMERGENCY DEPT VISIT HI MDM: CPT

## 2022-07-27 PROCEDURE — 36415 COLL VENOUS BLD VENIPUNCTURE: CPT

## 2022-07-27 PROCEDURE — 84484 ASSAY OF TROPONIN QUANT: CPT | Performed by: EMERGENCY MEDICINE

## 2022-07-27 PROCEDURE — 85610 PROTHROMBIN TIME: CPT | Performed by: EMERGENCY MEDICINE

## 2022-07-27 PROCEDURE — 80053 COMPREHEN METABOLIC PANEL: CPT | Performed by: EMERGENCY MEDICINE

## 2022-07-28 ENCOUNTER — APPOINTMENT (OUTPATIENT)
Dept: NON INVASIVE DIAGNOSTICS | Facility: HOSPITAL | Age: 72
DRG: 715 | End: 2022-07-28
Attending: RADIOLOGY
Payer: MEDICARE

## 2022-07-28 ENCOUNTER — ANESTHESIA (OUTPATIENT)
Dept: NON INVASIVE DIAGNOSTICS | Facility: HOSPITAL | Age: 72
DRG: 715 | End: 2022-07-28
Payer: MEDICARE

## 2022-07-28 ENCOUNTER — APPOINTMENT (EMERGENCY)
Dept: RADIOLOGY | Facility: HOSPITAL | Age: 72
DRG: 715 | End: 2022-07-28
Payer: MEDICARE

## 2022-07-28 ENCOUNTER — APPOINTMENT (OUTPATIENT)
Dept: RADIOLOGY | Facility: HOSPITAL | Age: 72
DRG: 715 | End: 2022-07-28
Payer: MEDICARE

## 2022-07-28 ENCOUNTER — APPOINTMENT (EMERGENCY)
Dept: CT IMAGING | Facility: HOSPITAL | Age: 72
DRG: 715 | End: 2022-07-28
Payer: MEDICARE

## 2022-07-28 PROBLEM — D62 ACUTE BLOOD LOSS ANEMIA: Status: ACTIVE | Noted: 2022-07-28

## 2022-07-28 PROBLEM — C79.9 METASTASIS OF UNKNOWN ORIGIN (HCC): Status: ACTIVE | Noted: 2022-07-28

## 2022-07-28 PROBLEM — R31.0 GROSS HEMATURIA: Status: ACTIVE | Noted: 2022-07-28

## 2022-07-28 PROBLEM — M25.511 RIGHT SHOULDER PAIN: Status: ACTIVE | Noted: 2022-07-28

## 2022-07-28 PROBLEM — N17.9 AKI (ACUTE KIDNEY INJURY) (HCC): Status: ACTIVE | Noted: 2022-07-28

## 2022-07-28 LAB
2HR DELTA HS TROPONIN: 1 NG/L
4HR DELTA HS TROPONIN: 3 NG/L
ABO GROUP BLD: NORMAL
ABO GROUP BLD: NORMAL
ANION GAP SERPL CALCULATED.3IONS-SCNC: 12 MMOL/L (ref 4–13)
ANION GAP SERPL CALCULATED.3IONS-SCNC: 9 MMOL/L (ref 4–13)
APTT PPP: 27 SECONDS (ref 23–37)
BACTERIA UR QL AUTO: ABNORMAL /HPF
BASOPHILS # BLD AUTO: 0.02 THOUSANDS/ÂΜL (ref 0–0.1)
BASOPHILS NFR BLD AUTO: 0 % (ref 0–1)
BILIRUB UR QL STRIP: NEGATIVE
BLD GP AB SCN SERPL QL: NEGATIVE
BUN SERPL-MCNC: 22 MG/DL (ref 5–25)
BUN SERPL-MCNC: 27 MG/DL (ref 5–25)
CALCIUM SERPL-MCNC: 8.2 MG/DL (ref 8.3–10.1)
CALCIUM SERPL-MCNC: 8.5 MG/DL (ref 8.3–10.1)
CARDIAC TROPONIN I PNL SERPL HS: 10 NG/L
CARDIAC TROPONIN I PNL SERPL HS: 8 NG/L
CHLORIDE SERPL-SCNC: 110 MMOL/L (ref 96–108)
CHLORIDE SERPL-SCNC: 111 MMOL/L (ref 96–108)
CLARITY UR: CLEAR
CO2 SERPL-SCNC: 17 MMOL/L (ref 21–32)
CO2 SERPL-SCNC: 19 MMOL/L (ref 21–32)
COLOR UR: YELLOW
CREAT SERPL-MCNC: 1.07 MG/DL (ref 0.6–1.3)
CREAT SERPL-MCNC: 1.18 MG/DL (ref 0.6–1.3)
EOSINOPHIL # BLD AUTO: 0.25 THOUSAND/ÂΜL (ref 0–0.61)
EOSINOPHIL NFR BLD AUTO: 3 % (ref 0–6)
ERYTHROCYTE [DISTWIDTH] IN BLOOD BY AUTOMATED COUNT: 17.2 % (ref 11.6–15.1)
GFR SERPL CREATININE-BSD FRML MDRD: 60 ML/MIN/1.73SQ M
GFR SERPL CREATININE-BSD FRML MDRD: 68 ML/MIN/1.73SQ M
GLUCOSE P FAST SERPL-MCNC: 84 MG/DL (ref 65–99)
GLUCOSE SERPL-MCNC: 103 MG/DL (ref 65–140)
GLUCOSE SERPL-MCNC: 84 MG/DL (ref 65–140)
GLUCOSE UR STRIP-MCNC: NEGATIVE MG/DL
HCT VFR BLD AUTO: 17.9 % (ref 36.5–49.3)
HCT VFR BLD AUTO: 22.2 % (ref 36.5–49.3)
HGB BLD-MCNC: 5.8 G/DL (ref 12–17)
HGB BLD-MCNC: 6.8 G/DL (ref 12–17)
HGB UR QL STRIP.AUTO: ABNORMAL
IMM GRANULOCYTES # BLD AUTO: 0.04 THOUSAND/UL (ref 0–0.2)
IMM GRANULOCYTES NFR BLD AUTO: 1 % (ref 0–2)
INR PPP: 1.18 (ref 0.84–1.19)
KETONES UR STRIP-MCNC: NEGATIVE MG/DL
LEUKOCYTE ESTERASE UR QL STRIP: ABNORMAL
LYMPHOCYTES # BLD AUTO: 2.23 THOUSANDS/ÂΜL (ref 0.6–4.47)
LYMPHOCYTES NFR BLD AUTO: 27 % (ref 14–44)
MCH RBC QN AUTO: 29.6 PG (ref 26.8–34.3)
MCHC RBC AUTO-ENTMCNC: 32.4 G/DL (ref 31.4–37.4)
MCV RBC AUTO: 91 FL (ref 82–98)
MONOCYTES # BLD AUTO: 0.92 THOUSAND/ÂΜL (ref 0.17–1.22)
MONOCYTES NFR BLD AUTO: 11 % (ref 4–12)
NEUTROPHILS # BLD AUTO: 4.96 THOUSANDS/ÂΜL (ref 1.85–7.62)
NEUTS SEG NFR BLD AUTO: 58 % (ref 43–75)
NITRITE UR QL STRIP: NEGATIVE
NON-SQ EPI CELLS URNS QL MICRO: ABNORMAL /HPF
NRBC BLD AUTO-RTO: 0 /100 WBCS
PH UR STRIP.AUTO: 5.5 [PH]
PLATELET # BLD AUTO: 126 THOUSANDS/UL (ref 149–390)
PMV BLD AUTO: 12.3 FL (ref 8.9–12.7)
POTASSIUM SERPL-SCNC: 4.2 MMOL/L (ref 3.5–5.3)
POTASSIUM SERPL-SCNC: 4.7 MMOL/L (ref 3.5–5.3)
PROT UR STRIP-MCNC: ABNORMAL MG/DL
PROTHROMBIN TIME: 14.8 SECONDS (ref 11.6–14.5)
RBC # BLD AUTO: 1.96 MILLION/UL (ref 3.88–5.62)
RBC #/AREA URNS AUTO: ABNORMAL /HPF
RH BLD: POSITIVE
RH BLD: POSITIVE
SODIUM SERPL-SCNC: 139 MMOL/L (ref 135–147)
SODIUM SERPL-SCNC: 139 MMOL/L (ref 135–147)
SP GR UR STRIP.AUTO: 1.02 (ref 1–1.03)
SPECIMEN EXPIRATION DATE: NORMAL
UROBILINOGEN UR QL STRIP.AUTO: 0.2 E.U./DL
WBC # BLD AUTO: 8.42 THOUSAND/UL (ref 4.31–10.16)
WBC #/AREA URNS AUTO: ABNORMAL /HPF
WBC CLUMPS # UR AUTO: PRESENT /UL

## 2022-07-28 PROCEDURE — 81001 URINALYSIS AUTO W/SCOPE: CPT | Performed by: EMERGENCY MEDICINE

## 2022-07-28 PROCEDURE — 86850 RBC ANTIBODY SCREEN: CPT | Performed by: EMERGENCY MEDICINE

## 2022-07-28 PROCEDURE — BT141ZZ FLUOROSCOPY OF KIDNEYS, URETERS AND BLADDER USING LOW OSMOLAR CONTRAST: ICD-10-PCS | Performed by: RADIOLOGY

## 2022-07-28 PROCEDURE — 86920 COMPATIBILITY TEST SPIN: CPT

## 2022-07-28 PROCEDURE — 0T9330Z DRAINAGE OF RIGHT KIDNEY PELVIS WITH DRAINAGE DEVICE, PERCUTANEOUS APPROACH: ICD-10-PCS | Performed by: RADIOLOGY

## 2022-07-28 PROCEDURE — 85025 COMPLETE CBC W/AUTO DIFF WBC: CPT | Performed by: PHYSICIAN ASSISTANT

## 2022-07-28 PROCEDURE — 84153 ASSAY OF PSA TOTAL: CPT | Performed by: NURSE PRACTITIONER

## 2022-07-28 PROCEDURE — C1894 INTRO/SHEATH, NON-LASER: HCPCS

## 2022-07-28 PROCEDURE — 50432 PLMT NEPHROSTOMY CATHETER: CPT | Performed by: RADIOLOGY

## 2022-07-28 PROCEDURE — 99220 PR INITIAL OBSERVATION CARE/DAY 70 MINUTES: CPT | Performed by: INTERNAL MEDICINE

## 2022-07-28 PROCEDURE — 84484 ASSAY OF TROPONIN QUANT: CPT | Performed by: EMERGENCY MEDICINE

## 2022-07-28 PROCEDURE — 87086 URINE CULTURE/COLONY COUNT: CPT | Performed by: NURSE PRACTITIONER

## 2022-07-28 PROCEDURE — 99285 EMERGENCY DEPT VISIT HI MDM: CPT | Performed by: EMERGENCY MEDICINE

## 2022-07-28 PROCEDURE — 36415 COLL VENOUS BLD VENIPUNCTURE: CPT | Performed by: EMERGENCY MEDICINE

## 2022-07-28 PROCEDURE — 99222 1ST HOSP IP/OBS MODERATE 55: CPT | Performed by: NURSE PRACTITIONER

## 2022-07-28 PROCEDURE — 0T9430Z DRAINAGE OF LEFT KIDNEY PELVIS WITH DRAINAGE DEVICE, PERCUTANEOUS APPROACH: ICD-10-PCS | Performed by: RADIOLOGY

## 2022-07-28 PROCEDURE — 50432 PLMT NEPHROSTOMY CATHETER: CPT

## 2022-07-28 PROCEDURE — 87077 CULTURE AEROBIC IDENTIFY: CPT | Performed by: EMERGENCY MEDICINE

## 2022-07-28 PROCEDURE — 80048 BASIC METABOLIC PNL TOTAL CA: CPT | Performed by: PHYSICIAN ASSISTANT

## 2022-07-28 PROCEDURE — 86900 BLOOD TYPING SEROLOGIC ABO: CPT | Performed by: EMERGENCY MEDICINE

## 2022-07-28 PROCEDURE — 71045 X-RAY EXAM CHEST 1 VIEW: CPT

## 2022-07-28 PROCEDURE — 85018 HEMOGLOBIN: CPT | Performed by: INTERNAL MEDICINE

## 2022-07-28 PROCEDURE — 86901 BLOOD TYPING SEROLOGIC RH(D): CPT | Performed by: EMERGENCY MEDICINE

## 2022-07-28 PROCEDURE — NC001 PR NO CHARGE: Performed by: RADIOLOGY

## 2022-07-28 PROCEDURE — C1729 CATH, DRAINAGE: HCPCS

## 2022-07-28 PROCEDURE — C1769 GUIDE WIRE: HCPCS

## 2022-07-28 PROCEDURE — 85014 HEMATOCRIT: CPT | Performed by: INTERNAL MEDICINE

## 2022-07-28 PROCEDURE — 73030 X-RAY EXAM OF SHOULDER: CPT

## 2022-07-28 PROCEDURE — P9016 RBC LEUKOCYTES REDUCED: HCPCS

## 2022-07-28 PROCEDURE — 87086 URINE CULTURE/COLONY COUNT: CPT | Performed by: EMERGENCY MEDICINE

## 2022-07-28 PROCEDURE — 74176 CT ABD & PELVIS W/O CONTRAST: CPT

## 2022-07-28 RX ORDER — PROPOFOL 10 MG/ML
INJECTION, EMULSION INTRAVENOUS CONTINUOUS PRN
Status: DISCONTINUED | OUTPATIENT
Start: 2022-07-28 | End: 2022-07-28

## 2022-07-28 RX ORDER — CEFAZOLIN SODIUM 2 G/50ML
SOLUTION INTRAVENOUS AS NEEDED
Status: DISCONTINUED | OUTPATIENT
Start: 2022-07-28 | End: 2022-07-28

## 2022-07-28 RX ORDER — FINASTERIDE 5 MG/1
5 TABLET, FILM COATED ORAL DAILY
Status: DISCONTINUED | OUTPATIENT
Start: 2022-07-29 | End: 2022-08-12 | Stop reason: HOSPADM

## 2022-07-28 RX ORDER — SODIUM CHLORIDE 9 MG/ML
10 INJECTION INTRAVENOUS DAILY
Qty: 900 ML | Refills: 1 | Status: SHIPPED | OUTPATIENT
Start: 2022-07-28 | End: 2022-10-26

## 2022-07-28 RX ORDER — PRAVASTATIN SODIUM 40 MG
40 TABLET ORAL
Status: DISCONTINUED | OUTPATIENT
Start: 2022-07-28 | End: 2022-08-12 | Stop reason: HOSPADM

## 2022-07-28 RX ORDER — FENTANYL CITRATE 50 UG/ML
INJECTION, SOLUTION INTRAMUSCULAR; INTRAVENOUS AS NEEDED
Status: DISCONTINUED | OUTPATIENT
Start: 2022-07-28 | End: 2022-07-28

## 2022-07-28 RX ORDER — LIDOCAINE HYDROCHLORIDE 10 MG/ML
INJECTION, SOLUTION EPIDURAL; INFILTRATION; INTRACAUDAL; PERINEURAL AS NEEDED
Status: DISCONTINUED | OUTPATIENT
Start: 2022-07-28 | End: 2022-07-28

## 2022-07-28 RX ORDER — METOPROLOL SUCCINATE 100 MG/1
100 TABLET, EXTENDED RELEASE ORAL DAILY
Status: DISCONTINUED | OUTPATIENT
Start: 2022-07-29 | End: 2022-08-12 | Stop reason: HOSPADM

## 2022-07-28 RX ORDER — SODIUM CHLORIDE 9 MG/ML
75 INJECTION, SOLUTION INTRAVENOUS CONTINUOUS
Status: DISCONTINUED | OUTPATIENT
Start: 2022-07-28 | End: 2022-08-02

## 2022-07-28 RX ORDER — SODIUM CHLORIDE, SODIUM LACTATE, POTASSIUM CHLORIDE, CALCIUM CHLORIDE 600; 310; 30; 20 MG/100ML; MG/100ML; MG/100ML; MG/100ML
INJECTION, SOLUTION INTRAVENOUS CONTINUOUS PRN
Status: DISCONTINUED | OUTPATIENT
Start: 2022-07-28 | End: 2022-07-28

## 2022-07-28 RX ORDER — LIDOCAINE WITH 8.4% SOD BICARB 0.9%(10ML)
SYRINGE (ML) INJECTION CODE/TRAUMA/SEDATION MEDICATION
Status: COMPLETED | OUTPATIENT
Start: 2022-07-28 | End: 2022-07-28

## 2022-07-28 RX ORDER — MIDAZOLAM HYDROCHLORIDE 2 MG/2ML
INJECTION, SOLUTION INTRAMUSCULAR; INTRAVENOUS AS NEEDED
Status: DISCONTINUED | OUTPATIENT
Start: 2022-07-28 | End: 2022-07-28

## 2022-07-28 RX ORDER — ALLOPURINOL 300 MG/1
300 TABLET ORAL DAILY
Status: DISCONTINUED | OUTPATIENT
Start: 2022-07-29 | End: 2022-08-12 | Stop reason: HOSPADM

## 2022-07-28 RX ORDER — ACETAMINOPHEN 325 MG/1
650 TABLET ORAL EVERY 6 HOURS PRN
Status: DISCONTINUED | OUTPATIENT
Start: 2022-07-28 | End: 2022-08-12 | Stop reason: HOSPADM

## 2022-07-28 RX ORDER — TAMSULOSIN HYDROCHLORIDE 0.4 MG/1
0.4 CAPSULE ORAL
Status: DISCONTINUED | OUTPATIENT
Start: 2022-07-28 | End: 2022-08-12 | Stop reason: HOSPADM

## 2022-07-28 RX ORDER — SODIUM CHLORIDE 9 MG/ML
10 INJECTION INTRAVENOUS DAILY
Qty: 900 ML | Refills: 2 | Status: SHIPPED | OUTPATIENT
Start: 2022-07-28 | End: 2022-10-26

## 2022-07-28 RX ORDER — ONDANSETRON 2 MG/ML
4 INJECTION INTRAMUSCULAR; INTRAVENOUS ONCE AS NEEDED
Status: DISCONTINUED | OUTPATIENT
Start: 2022-07-28 | End: 2022-07-28 | Stop reason: HOSPADM

## 2022-07-28 RX ORDER — FENTANYL CITRATE/PF 50 MCG/ML
25 SYRINGE (ML) INJECTION
Status: DISCONTINUED | OUTPATIENT
Start: 2022-07-28 | End: 2022-07-28 | Stop reason: HOSPADM

## 2022-07-28 RX ADMIN — Medication 10 ML: at 14:11

## 2022-07-28 RX ADMIN — Medication 10 MG: at 14:07

## 2022-07-28 RX ADMIN — MIDAZOLAM HYDROCHLORIDE 1 MG: 1 INJECTION, SOLUTION INTRAMUSCULAR; INTRAVENOUS at 13:39

## 2022-07-28 RX ADMIN — CEFAZOLIN SODIUM 2000 MG: 2 SOLUTION INTRAVENOUS at 13:40

## 2022-07-28 RX ADMIN — PRAVASTATIN SODIUM 40 MG: 40 TABLET ORAL at 17:32

## 2022-07-28 RX ADMIN — SODIUM CHLORIDE 75 ML/HR: 9 INJECTION, SOLUTION INTRAVENOUS at 18:42

## 2022-07-28 RX ADMIN — Medication 10 MG: at 13:44

## 2022-07-28 RX ADMIN — SODIUM CHLORIDE, POTASSIUM CHLORIDE, SODIUM LACTATE AND CALCIUM CHLORIDE: 600; 310; 30; 20 INJECTION, SOLUTION INTRAVENOUS at 13:33

## 2022-07-28 RX ADMIN — SODIUM CHLORIDE 75 ML/HR: 9 INJECTION, SOLUTION INTRAVENOUS at 05:01

## 2022-07-28 RX ADMIN — Medication 10 MG: at 13:51

## 2022-07-28 RX ADMIN — FENTANYL CITRATE 25 MCG: 50 INJECTION, SOLUTION INTRAMUSCULAR; INTRAVENOUS at 13:51

## 2022-07-28 RX ADMIN — TAMSULOSIN HYDROCHLORIDE 0.4 MG: 0.4 CAPSULE ORAL at 17:32

## 2022-07-28 RX ADMIN — PROPOFOL 40 MCG/KG/MIN: 10 INJECTION, EMULSION INTRAVENOUS at 13:44

## 2022-07-28 RX ADMIN — IOHEXOL 20 ML: 300 INJECTION, SOLUTION INTRAVENOUS at 14:48

## 2022-07-28 RX ADMIN — FENTANYL CITRATE 25 MCG: 50 INJECTION, SOLUTION INTRAMUSCULAR; INTRAVENOUS at 13:44

## 2022-07-28 RX ADMIN — Medication 10 ML: at 14:36

## 2022-07-28 RX ADMIN — LIDOCAINE HYDROCHLORIDE 50 MG: 10 INJECTION, SOLUTION EPIDURAL; INFILTRATION; INTRACAUDAL; PERINEURAL at 13:44

## 2022-07-28 NOTE — ASSESSMENT & PLAN NOTE
· CT a/p 07/27: revealed 1 cm nodule in the right middle lobe not seen on the prior exam from 6/22/2020 concerning for metastasis and diffuse sclerosis throughout the osseous pelvis and in the L3, L2, T12, T11, T10, and T8 vertebral bodies also concerning for metastasis   · Elevated alk phos  · Prostate likely source   · Would benefit from oncology consultation

## 2022-07-28 NOTE — H&P
[address] 1950, 67 y o  male MRN: [id number]  Unit/Bed#: ED 18 Encounter: [id number]  Primary Care Provider: TRICIA Shaffer   Date and time admitted to hospital: 7/27/2022 11:40 PM    * Acute blood loss anemia  Assessment & Plan  hgb 6 6 on admission  Last hgb 13 on labs from 10/2021  Has assoc fatigue   · Likely 2/2 hematuria   · Trend hgb q8hr  · Transfuse for hgb <6 (per blood bank given patients blood type)     Gross hematuria  Assessment & Plan  Patient presents with gross hematuria x 1m  Had had prostate biopsy in past, last bx 10/2021, cytology negative  Follows with a urology in [address] [place] [address]    · CT a/p 07/27: asymmetrically enlarged prostate gland; left side is larger  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  Bilateral hydronephrosis likely from obstruction at the UVJ secondary to enlarged/invading prostate  No renal stones  · Hold aspirin/nsaid   · Urinary retention protocol  · Urology consult in am     MICA (acute kidney injury) ([facility] [address] )  Assessment & Plan  Cr 1 50  no recent baseline   · CT a/p with above findings  bladder wall thickening likely hypertrophy vs cystitis  Posterior aspect of bladder is invaded by the prostate gland  Bilateral hydronephrosis   · Hold lasix, NSAIDs  · gentle IVFs  · Monitor io, avoid nephrotoxins    Right shoulder pain  Assessment & Plan  Limited mobility due to pain  Worse with shoulder extension     · XR R shoulder   · Tylenol prn    Metastasis of unknown origin [facility])  Assessment & Plan  · CT a/p 07/27: revealed 1 cm nodule in the right middle lobe not seen on the prior exam from 6/22/2020 concerning for metastasis and diffuse sclerosis throughout the osseous pelvis and in the L3, L2, T12, T11, T10, and T8 vertebral bodies also concerning for metastasis   · Elevated alk phos  · Prostate likely source   · Would benefit from oncology consultation     VTE Pharmacologic Prophylaxis:   Moderate Risk (Score 3-4) - Pharmacological DVT Prophylaxis Contraindicated  Sequential Compression Devices Ordered  Code Status: Prior   Discussion with family: Updated  (daughter) at bedside  Anticipated Length of Stay: Patient will be admitted on an observation basis with an anticipated length of stay of less than 2 midnights secondary to hematuria  Total Time for Visit, including Counseling / Coordination of Care: 60 minutes Greater than 50% of this total time spent on direct patient counseling and coordination of care  Chief Complaint:   Chief Complaint   Patient presents with    Paralysis     Family states"patient is feeling paralysis in bilateral arms and bilateral legs"  Family states"this has been going for approx 2 weeks"  Patient also c/o gout flare up, frequent urination, constipation,  and arthritis         History of Present Illness:  Joe Smith is a 67 y o  Jordanian speaking male with a PMH of HTN, BPH who presents with weakness, fatigue  Patient is brought in by his daughter with concerns for weakness in upper and lower extremities  Patient reports he has been unable to walk for the past 2 days  He denies pain or numbness and tingling in lower extremities  Patient noted to have gross hematuria  On his report, it has been going on for greater than 1 month  He denies abdominal pain, nausea vomiting  Patient reports history of BPH and has been following with a urologist in [place]  He is supposed to be undergoing some type of urological procedurey however has not been feeling well enough medically to do so  Daughter has brought him to this area for second opinion  He has had his prostate biopsy to approximately 9 months ago, cytology was negative that time  He has developed dysuria approximately 1 week ago and increased urinary frequency  He has pain in his right shoulder which has been ongoing for the past 3 months  No fever or chills    Patient found to have hemoglobin 6 6 in ED  He does admit to having fatigue rather than weakness as previously described when clarified further  Review of Systems:  A 10-point review of systems was obtained  Pertinent positives and negatives are outlined in the HPI above  Remainder of review of systems are otherwise negative  Past Medical and Surgical History:   Past Medical History:   Diagnosis Date    Gout     Hypertension        History reviewed  No pertinent surgical history  Meds/Allergies:  Prior to Admission medications    Medication Sig Start Date End Date Taking?  Authorizing Provider   allopurinol (ZYLOPRIM) 100 mg tablet Take 300 mg by mouth daily     Historical Provider, MD   allopurinol (ZYLOPRIM) 300 mg tablet Take 300 mg by mouth daily    Historical Provider, MD   aspirin 81 mg chewable tablet Chew 81 mg daily    Historical Provider, MD   brompheniramine-pseudoephedrine-DM 30-2-10 MG/5ML syrup Take 5 mL by mouth 4 (four) times a day as needed for cough 7/7/20   TRICIA Chavez   fluticasone (FLONASE) 50 mcg/act nasal spray 1 spray into each nostril daily 7/7/20   TRICIA Chavez   lisinopril (ZESTRIL) 10 mg tablet Take 1 tablet (10 mg total) by mouth daily 5/10/20   Becky Cottrell MD   methylPREDNISolone 4 MG tablet therapy pack Use as directed on package 6/19/20   TRICIA Chavez   metoprolol succinate (TOPROL-XL) 50 mg 24 hr tablet Take 2 tablets (100 mg total) by mouth daily 6/19/20   TRICIA Chavez   multivitamin [facility]) TABS Take 1 tablet by mouth daily 5/9/20   Becky Cottrell MD   oxybutynin (DITROPAN) 5 mg tablet Take 5 mg by mouth daily    Historical Provider, MD   pantoprazole (PROTONIX) 40 mg tablet Take 1 tablet (40 mg total) by mouth daily 5/9/20 6/8/20  Becky Cottrell MD   promethazine-codeine (PHENERGAN WITH CODEINE) 6 25-10 mg/5 mL syrup Take 5 mL by mouth every 4 (four) hours as needed for cough 7/7/20   TRICIA Chavez   simvastatin (ZOCOR) 10 mg tablet Take 2 tablets (20 mg total) by mouth daily at bedtime 6/10/20   Veronika TRICIA Cardenas   tamsulosin (FLOMAX) 0 4 mg Take 0 4 mg by mouth daily with dinner    Historical Provider, MD     I have reviewed home medications with patient family member  Allergies: No Known Allergies    Social History:  Marital Status: Single   Occupation:   Patient Pre-hospital Living Situation: Home  Patient Pre-hospital Level of Mobility: walks  Patient Pre-hospital Diet Restrictions:   Substance Use History:   Social History     Substance and Sexual Activity   Alcohol Use Yes     Social History     Tobacco Use   Smoking Status Former Smoker    Types: Cigarettes   Smokeless Tobacco Never Used   Tobacco Comment    quit 24yrs ago     Social History     Substance and Sexual Activity   Drug Use Never       Family History:  Family History   Problem Relation Age of Onset    Diabetes Mother     Hypertension Mother     Mental illness Mother     Heart attack Son     Heart attack Daughter        Physical Exam:     Vitals:   Blood Pressure: 141/68 (07/28/22 0430)  Pulse: 83 (07/28/22 0430)  Temperature: 98 2 °F (36 8 °C) (07/28/22 0005)  Temp Source: Oral (07/28/22 0005)  Respirations: (!) 28 (07/28/22 0430)  SpO2: 99 % (07/28/22 0430)    Physical Exam  Vitals and nursing note reviewed  Constitutional:       Appearance: He is well-developed  HENT:      Head: Normocephalic and atraumatic  Nose: Nose normal    Eyes:      General: No scleral icterus  Extraocular Movements: Extraocular movements intact  Conjunctiva/sclera: Conjunctivae normal       Pupils: Pupils are equal, round, and reactive to light  Cardiovascular:      Rate and Rhythm: Normal rate and regular rhythm  Heart sounds: Normal heart sounds  No murmur heard  Pulmonary:      Effort: Pulmonary effort is normal  No respiratory distress  Breath sounds: Normal breath sounds  No wheezing  Abdominal:      General: Bowel sounds are normal  There is no distension        Palpations: Abdomen is soft  Tenderness: There is no abdominal tenderness  Musculoskeletal:      Cervical back: Neck supple  Skin:     General: Skin is warm and dry  Neurological:      Mental Status: He is alert and oriented to person, place, and time  Mental status is at baseline  Motor: No weakness  Additional Data:     Lab Results:  Results from last 7 days   Lab Units 07/27/22  2307   WBC Thousand/uL 9 42   HEMOGLOBIN g/dL 6 6*   HEMATOCRIT % 20 6*   PLATELETS Thousands/uL 141*   NEUTROS PCT % 69   LYMPHS PCT % 21   MONOS PCT % 7   EOS PCT % 2     Results from last 7 days   Lab Units 07/27/22  2307   SODIUM mmol/L 139   POTASSIUM mmol/L 4 1   CHLORIDE mmol/L 108   CO2 mmol/L 19*   BUN mg/dL 31*   CREATININE mg/dL 1 50*   ANION GAP mmol/L 12   CALCIUM mg/dL 8 8   ALBUMIN g/dL 3 4*   TOTAL BILIRUBIN mg/dL 0 31   ALK PHOS U/L 1,044*   ALT U/L 11*   AST U/L 41   GLUCOSE RANDOM mg/dL 103     Results from last 7 days   Lab Units 07/27/22  2307   INR  1 18                   Imaging: Reviewed radiology reports from this admission including: abdominal/pelvic CT  CT abdomen pelvis wo contrast   Final Result by Alex Hoffman MD (07/28 5124)      Asymmetrically enlarged prostate gland; left side is larger     Bladder wall thickening likely hypertrophy however may represent cystitis in the appropriate clinical setting  Posterior aspect is invaded by the prostate gland  Bilateral hydronephrosis    likely from obstruction at the UVJ secondary to enlarged/invading prostate  No renal stones  1 cm nodule in the right middle lobe not seen on the prior exam from 6/22/2020 concerning for metastasis  Trace bilateral effusions  Diffuse sclerosis throughout the osseous pelvis and in the L3, L2, T12, T11, T10, and T8 vertebral bodies is concerning for metastasis  There appears to be decreased signal adenopathy measuring 1 6 cm in the short axis (2:61)  Shotty periaortic adenopathy is noted            Workstation performed: [id number]         XR chest 1 view portable    (Results Pending)   XR shoulder 2+ vw right    (Results Pending)       EKG and Other Studies Reviewed on Admission:   · EKG: NSR  HR 77     ** Please Note: This note has been constructed using a voice recognition system   **

## 2022-07-28 NOTE — UTILIZATION REVIEW
Initial Clinical Review    Pt initially admitted as Observation on 7/28 @ 1507  Changed to Inpatient on 7/28 @ [phone]  Pt requiring continued stay d/t ongoing workup and treatment of anemia  Admission: Date/Time/Statement:   Admission Orders (From admission, onward)     Ordered        07/28/22 1507  Inpatient Admission  Once            07/28/22 0347  Place in Observation  Once                      Orders Placed This Encounter   Procedures    Inpatient Admission     Standing Status:   Standing     Number of Occurrences:   1     Order Specific Question:   Level of Care     Answer:   Med Surg [16]     Order Specific Question:   Estimated length of stay     Answer:   More than 2 Midnights     Order Specific Question:   Certification     Answer:   I certify that inpatient services are medically necessary for this patient for a duration of greater than two midnights  See H&P and MD Progress Notes for additional information about the patient's course of treatment  ED Arrival Information     Expected   -    Arrival   7/27/2022 22:50    Acuity   Emergent            Means of arrival   Wheelchair    Escorted by   Family Member    Service   Hospitalist    Admission type   Emergency            Arrival complaint   medical problems           Chief Complaint   Patient presents with    Paralysis     Family states"patient is feeling paralysis in bilateral arms and bilateral legs"  Family states"this has been going for approx 2 weeks"  Patient also c/o gout flare up, frequent urination, constipation,  and arthritis       Initial Presentation: 67 y o  male who presented self from home to [facility] ED  Initially admitted in observation status, then changed to inpatient status for evaluation and treatment of acute blood loss anemia, gross hematuria, MICA  PMHx: Gout, HTN, BPH  Presented w/ generalized weakness s/t fatigue   Noted to have gross hematuria, pt reports has been ongoing for 1 month as well as increased urinary frequency  On exam, unremarkable  Hgb 6 6  Plt 141  Crt 1 5  CTAP shows enlarged prostate, hydronephrosis s/t enlarged prostate, 1 cm nodule in R middle lobe, diffuse sclerosis through pelvis and lumbar/thoracic spine concerning for metastasis  Plan: trend H&H q8h, transfuse for Hgb below 6 s/t blood type shortage, hold ASA and lasix, IVF, I&O, monitor for urinary retention  Urology consulted  Urology Consult: On exam, no CVA tenderness or suprapubic tenderness  Prostate is enlarged at 45-50 grams, to mid-left firm nodules noted  Plan: measure PVR, IVF, avoid nephrotoxins, trend renal function, possible b/l PCN w/ IR vs TURP w/ stent  Pt will need prostate biopsy  After discussion w/ family, agree w/ b/l percutaneous nephrostomy tube insertion and additional outpatient follow-up  7/28 IR Procedure - IR NEPHROSTOMY TUBE PLACEMENT  Indication: hematuria, anemia, hydronephrosis, BPH  Anesthesia: MAC sedation  Findings: Successful bilateral nephrostomy tube placement due to enlarged prostate and bladder outlet obstruction  Plan: Tubes to bag drainage, flush bid in the hospital and qd at home and return in 2 months for routine tube changes  Follow-up with urology for treatment of the enlarged probable cancerous prostate  Date: 07/29/22 Day 2: Pt afebrile, hemodynamically stable overnight  On exam, b/l PCNs patent w/ cherry red urine flushing well; diminished lung sounds  Hgb 5 5  Receiving 1 unit pRBCs  Plan for bone biopsy early next week  Continue IVF, regular diet, DW, I&O, H&H q8h, flush b/l nephrostomy tubes w/ 10 mL q24h       ED Triage Vitals   Temperature Pulse Respirations Blood Pressure SpO2   07/28/22 0005 07/28/22 0005 07/28/22 0005 07/28/22 0005 07/28/22 0005   98 2 °F (36 8 °C) 78 18 146/77 99 %      Temp Source Heart Rate Source Patient Position - Orthostatic VS BP Location FiO2 (%)   07/28/22 0005 07/28/22 0005 07/28/22 0130 07/28/22 0005 --   Oral Monitor Lying Right arm       Pain Score 07/28/22 0740       No Pain          Wt Readings from Last 1 Encounters:   07/28/22 93 kg (205 lb 0 4 oz)     Additional Vital Signs:   Date/Time Temp Pulse Resp BP MAP (mmHg) SpO2 O2 Device   07/29/22 07:22:43 -- 103 -- 136/65 89 100 % None (Room air)   07/29/22 04:12:42 -- 90 -- 99/57 71 97 % --   07/28/22 22:36:10 98 1 °F (36 7 °C) 87 18 122/68 86 99 % --   07/28/22 1831 -- 72 -- 134/72 93 95 % --   07/28/22 1816 -- 80 -- 142/72 95 92 % None (Room air)   07/28/22 1747 -- 66 -- 106/65 79 99 % --   07/28/22 1722 -- 80 -- 118/64 82 98 % --   07/28/22 17:02:32 -- 74 -- 130/70 90 97 % None (Room air)   07/28/22 16:12:12 97 6 °F (36 4 °C) 82 16 115/74 88 97 % None (Room air)   07/28/22 1545 97 3 °F (36 3 °C) 64 14 113/59 81 98 % None (Room air)   07/28/22 1505 97 6 °F (36 4 °C)  75  19  118/62  83 99 %  None (Room air)   07/28/22 1300 98 4 °F (36 9 °C) 74 23 Abnormal  145/70 100 98 % None (Room air)   07/28/22 1258 98 °F (36 7 °C) 74 18 130/70 -- 100 % None (Room air)   07/28/22 1243 98 4 °F (36 9 °C) 72 36 Abnormal  136/70 96 100 % None (Room air)   07/28/22 1228 98 6 °F (37 °C) -- -- 136/70 -- -- None (Room air)   07/28/22 1213 98 8 °F (37 1 °C) 71 24 Abnormal  134/66 -- 100 % None (Room air)   07/28/22 1142 98 5 °F (36 9 °C) 80 22 158/72 -- -- --   07/28/22 1100 -- -- -- -- -- 100 % None (Room air)   07/28/22 1058 -- 90 20 131/93 -- 100 % None (Room air)       Date/Time Temp Pulse Resp BP MAP (mmHg) SpO2 O2 Device   07/28/22 0909 -- 80 20 139/85 -- 100 % None (Room air)   07/28/22 0740 98 °F (36 7 °C) 74 32 Abnormal  152/70 100 100 % None (Room air)   07/28/22 0600 -- 83 22 147/69 99 100 % --   07/28/22 0530 -- 77 26 Abnormal  147/75 106 100 % --   07/28/22 0430 -- 83 28 Abnormal  141/68 98 99 % --   07/28/22 0400 -- 76 19 162/79 114 99 % --   07/28/22 0300 -- 74 9 Abnormal  161/80 115 84 % Abnormal  --   07/28/22 0230 -- 74 20 156/74 107 97 % None (Room air)   07/28/22 0130 -- 76 18 156/84 114 100 % None (Room air)   07/28/22 0115 -- 75 18 159/77 110 100 % None (Room air)       Pertinent Labs/Diagnostic Test Results:   XR shoulder 2+ vw right   Final Result by Papito Lo DO (07/28 1127)      Mottled sclerotic changes in the scapula, concerning for bony metastatic disease         CT abdomen pelvis wo contrast   Final Result by Trey Velazquez MD (07/28 3879)      Asymmetrically enlarged prostate gland; left side is larger     Bladder wall thickening likely hypertrophy however may represent cystitis in the appropriate clinical setting  Posterior aspect is invaded by the prostate gland  Bilateral hydronephrosis    likely from obstruction at the UVJ secondary to enlarged/invading prostate  No renal stones  1 cm nodule in the right middle lobe not seen on the prior exam from 6/22/2020 concerning for metastasis  Trace bilateral effusions  Diffuse sclerosis throughout the osseous pelvis and in the L3, L2, T12, T11, T10, and T8 vertebral bodies is concerning for metastasis  There appears to be decreased signal adenopathy measuring 1 6 cm in the short axis (2:61)  Shotty periaortic adenopathy is noted            XR chest 1 view portable   Final Result by Guillermina Webster DO (07/28 0202)      Limited chest with no acute cardiopulmonary disease  Question mottled density of the right scapula                 Results from last 7 days   Lab Units 07/29/22  0942 07/28/22  1722 07/28/22 0619 07/27/22  2307   WBC Thousand/uL  --   --  8 42 9 42   HEMOGLOBIN g/dL 5 5* 6 8* 5 8* 6 6*   HEMATOCRIT % 16 9* 22 2* 17 9* 20 6*   PLATELETS Thousands/uL  --   --  126* 141*   NEUTROS ABS Thousands/µL  --   --  4 96 6 50         Results from last 7 days   Lab Units 07/28/22  1722 07/28/22  0619 07/27/22  2307   SODIUM mmol/L 139 139 139   POTASSIUM mmol/L 4 7 4 2 4 1   CHLORIDE mmol/L 110* 111* 108   CO2 mmol/L 17* 19* 19*   ANION GAP mmol/L 12 9 12   BUN mg/dL 22 27* 31*   CREATININE mg/dL 1 07 1 18 1 50*   EGFR ml/min/1 73sq m 68 60 45   CALCIUM mg/dL 8 5 8 2* 8 8     Results from last 7 days   Lab Units 07/27/22  2307   AST U/L 41   ALT U/L 11*   ALK PHOS U/L 1,044*   TOTAL PROTEIN g/dL 6 9   ALBUMIN g/dL 3 4*   TOTAL BILIRUBIN mg/dL 0 31         Results from last 7 days   Lab Units 07/28/22  1722 07/28/22  0619 07/27/22  2307   GLUCOSE RANDOM mg/dL 103 84 103     Results from last 7 days   Lab Units 07/28/22  0326 07/28/22  0113 07/27/22  2307   HS TNI 0HR ng/L  --   --  7   HS TNI 2HR ng/L  --  8  --    HSTNI D2 ng/L  --  1  --    HS TNI 4HR ng/L 10  --   --    HSTNI D4 ng/L 3  --   --          Results from last 7 days   Lab Units 07/27/22  2307   PROTIME seconds 14 8*   INR  1 18   PTT seconds 27     Results from last 7 days   Lab Units 07/28/22  0110   CLARITY UA  Clear   COLOR UA  Yellow   SPEC GRAV UA  1 020   PH UA  5 5   GLUCOSE UA mg/dl Negative   KETONES UA mg/dl Negative   BLOOD UA  Large*   PROTEIN UA mg/dl Trace*   NITRITE UA  Negative   BILIRUBIN UA  Negative   UROBILINOGEN UA E U /dl 0 2   LEUKOCYTES UA  Small*   WBC UA /hpf 10-20*   RBC UA /hpf 10-20*   BACTERIA UA /hpf Occasional   EPITHELIAL CELLS WET PREP /hpf Occasional       ED Treatment:   Medication Administration from 07/27/2022 2249 to 07/28/2022 1047       Date/Time Order Dose Route Action     07/28/2022 0501 sodium chloride 0 9 % infusion 75 mL/hr Intravenous New Bag        Past Medical History:   Diagnosis Date    Gout     Hypertension        Admitting Diagnosis: Anemia [D64 9]  Paralysis ([address] ) [G83 9]  Hematuria [R31 9]  Other hydronephrosis [N13 39]  Benign prostatic hyperplasia without lower urinary tract symptoms [N40 0]  Age/Sex: 67 y o  male  Admission Orders:  Regular Diet  DW  I&O  Flush nephrostomy tubes daily  H&H q8h      Scheduled Medications:  allopurinol, 300 mg, Oral, Daily  finasteride, 5 mg, Oral, Daily  metoprolol succinate, 100 mg, Oral, Daily  pravastatin, 40 mg, Oral, Daily With Dinner  tamsulosin, 0 4 mg, Oral, Daily With Dinner    Continuous IV Infusions:  sodium chloride, 75 mL/hr, Intravenous, Continuous    PRN Meds:  acetaminophen, 650 mg, Oral, Q6H; 7/29 x1        IP CONSULT TO UROLOGY  INPATIENT CONSULT TO IR    Network Utilization Review Department  ATTENTION: Please call with any questions or concerns to [phone] and carefully listen to the prompts so that you are directed to the right person  All voicemails are confidential   Roxie Vasquez all requests for admission clinical reviews, approved or denied determinations and any other requests to dedicated fax number below belonging to the campus where the patient is receiving treatment   List of dedicated fax numbers for the Facilities:  [address] DENIALS (Administrative/Medical Necessity) [phone]   [address] (Maternity/NICU/Pediatrics) [phone]   [address]  [address] [id number] [place] [address] [address]ept[address] [place] [address] [phone]

## 2022-07-28 NOTE — BRIEF OP NOTE (RAD/CATH)
INTERVENTIONAL RADIOLOGY PROCEDURE NOTE    Date: 7/28/2022    Procedure: IR NEPHROSTOMY TUBE PLACEMENT    Preoperative diagnosis:   1  Hematuria    2  Anemia    3  Other hydronephrosis    4  Benign prostatic hyperplasia without lower urinary tract symptoms       Postoperative diagnosis: Same  Surgeon: Atlas Sandhoff, MD     Assistant: None  No qualified resident was available  Blood loss: minimal    Specimens: urine     Findings: Successful bilateral nephrostomy tube placement due to enlarged prostate and bladder outlet obstruction  Plan:  Tubes to bag drainage, flush bid in the hospital and qd at home and return in 2 months for routine tube changes  Follow-up with urology for treatment of the enlarged probable cancerous prostate  Complications: None immediate      Anesthesia: MAC sedation

## 2022-07-28 NOTE — NURSING NOTE
Per blood bank, there is shortage on O blood  Patient has order to receive 1 unit of packed RBC due to hemoglobin being 6 7 Dr Chauhan Meals informed of shortage  Per SLIM, we can hold off on transfusion and recheck H/H in am  Staff is to recheck H/H sooner with signs of active bleeding  SLIM made aware that patient is having bloody drainage from nephrostomy tube, color is not bright red and clots not noted  Blood bank made aware of holding transfusion for now  Name of pathologist given to Meghna Wolfe

## 2022-07-28 NOTE — ANESTHESIA POSTPROCEDURE EVALUATION
Post-Op Assessment Note    CV Status:  Stable  Pain Score: 0    Pain management: adequate     Mental Status:  Sleepy   Hydration Status:  Euvolemic   PONV Controlled:  Controlled   Airway Patency:  Patent      Post Op Vitals Reviewed: Yes      Staff: CRNA         No complications documented  /62 (Simultaneous filing  User may not have seen previous data ) (07/28/22 1505)    Temp 97 6 °F (36 4 °C) (07/28/22 1505)    Pulse 73 (07/28/22 1505)   Resp 19 (07/28/22 1505)    SpO2 99 % (Simultaneous filing   User may not have seen previous data ) (07/28/22 1505)

## 2022-07-28 NOTE — ANESTHESIA PREPROCEDURE EVALUATION
Procedure:  IR NEPHROSTOMY TUBE PLACEMENT    Relevant Problems   CARDIO   (+) Hypertension      HEMATOLOGY   (+) Acute blood loss anemia      PULMONARY   (+) Acute respiratory failure with hypoxia (HCC) secondary to presumed COVID-19 infection      Genitourinary   (+) Gross hematuria      Other   (+) Metastasis of unknown origin ([facility] [address] )      Likely metastatic prostate cancer  NPO confirmed  Hgb 5 8, 1u PRBC transfused  Physical Exam    Airway    Mallampati score: III  TM Distance: >3 FB  Neck ROM: full     Dental   Comment: Denies loose teeth,     Cardiovascular  Cardiovascular exam normal    Pulmonary  Pulmonary exam normal     Other Findings  Portions of exam deferred due to low yield and/or unknown COVID status      Anesthesia Plan  ASA Score- 4     Anesthesia Type- IV sedation with anesthesia with ASA Monitors  Additional Monitors:   Airway Plan:           Plan Factors-    Chart reviewed  Existing labs reviewed  Patient summary reviewed  Patient is not a current smoker  Induction- intravenous  Postoperative Plan-     Informed Consent- Anesthetic plan and risks discussed with patient  I personally reviewed this patient with the CRNA  Discussed and agreed on the Anesthesia Plan with the CRNA  Melanie Emanuel at bedside as

## 2022-07-28 NOTE — DISCHARGE INSTRUCTIONS
Nephrostomy Tube Care     WHAT YOU NEED TO KNOW:   A nephrostomy tube is a catheter (thin plastic tube) that is inserted through your skin and into your kidney  The nephrostomy tube drains urine from your kidney into a collecting bag outside your body  You may need a nephrostomy tube when something is blocking the normal flow of urine  A nephrostomy tube may be used for a short or a long period of time  The nephrostomy tube comes out of your back, so you will need someone to help care for your nephrostomy tube  DISCHARGE INSTRUCTIONS:      How to clean the skin around the nephrostomy tube and change the bandage:  Since the nephrostomy tube comes out of your back, you will not be able to care for it by yourself  Ask someone to follow the general directions below to check and care for your nephrostomy tube  Gather the items you will need  Disposable (single use) under-pad, and a clean washcloth  Plain soap, warm water, and new medical gloves  Sterile gauze bandages  Clear adhesive dressing or medical tape  Skin barrier  Protective skin film  Trash bag  Remove the old bandage, and check the tube entry site  Have the patient lie on his side with the nephrostomy tube entry site facing up  Place the under-pad where it will catch drainage as you are working with the nephrostomy tube  Wash your hands with soap and water  Put on new medical gloves  Gently remove the old bandage, without pulling on the tube  Do this by holding the skin beside the tube with one hand  With the other hand, gently remove sticky tape and the skin barrier by pulling in the same direction as hair growth  Do not touch the side of the bandage that is placed over or around the tube  Throw the bandage and skin barrier away in a trash bag  Look for signs of infection, such as skin redness and swelling  Report any skin changes to healthcare providers  Clean the tube entry site      Hold the tube in place to keep it from being pulled out while you are cleaning around it  You will need to clean the area twice  For the first cleaning, wet a new gauze bandage with soap and water  Begin at the entry site of the tube  Wipe the skin in circles, moving away from the entry site  Remove blood and any other material with the gauze  Do this as often as needed  Use a new gauze bandage each time you clean the area, moving away from the entry site  For the second cleaning, wet a new gauze bandage with water  Begin at the entry site of the tube  Wipe the skin in circles, moving away from the entry site  Use a new gauze bandage each time you clean the area, moving away from the entry site  Gently pat the skin with a clean washcloth to dry it  Apply the skin barrier and bandages  Roll up a bandage to make it thick, and place it under  the place where the tube enters the skin  Place it to support the tube, and stop it from kinking or bending  Tape the bandage in place, and apply more bandages if directed by a healthcare provider  Bring the tubing forward to the front and tape it to the skin  Do not stretch the tube tight, because this may pull the nephrostomy tube out  How often to change the bandage  Change the bandage around the tube, every other day  If your bandages  get dirty or wet, change them right away, and as often as needed  If your nephrostomy tube is to be used for a long period of time, the tube needs to be changed every 2 to 3 months  Healthcare providers will tell you when you need to make an appointment to have your tube changed  How to care for the urine drainage bag:   Ask if you need to measure and write down how much urine is in the bag before you empty it  Drain urine out of the drainage bag when it is ½ to ? full  Open the spout at the bottom of the bag to empty the urine into the toilet  You may need to detach the drainage bag from the nephrostomy tube to change it    If so, attach a new drainage bag tightly to the nephrostomy tube  How to prevent problems with your nephrostomy tube:   Change bandages, directed  This helps to prevent infection  Throw away or clean your drainage bag as directed by your healthcare provider  Wipe the connecting ends of the drainage bag with alcohol before you reconnect the bag to the tube  This helps prevent infection  Keep the tube taped to your skin and connected to a drainage bag placed below the level of your kidneys  This helps prevent urine from backing up into your kidneys  You may wear a small drainage bag strapped to your leg to let you move around more easily  Check the catheter to be sure it is in place after you change your clothes or do other activities  Do not wear tight clothing over the tube  Place the tubing over your thigh rather than under it when you are sitting down  Be sure that nothing is pulling on the nephrostomy tube when you move around  Change positions if you see little or no urine in your drainage bag  Check to see if the urine tube is twisted or bent  Be sure that you are not sitting or lying on the tube  If there are no kinks and there is little or no urine in the drainage bag, tell your healthcare provider  Flush out the tube as directed  Some tubes get flushed one time a day with 10 mls of NSS You will be given a prescription for the flushes  To flush the nephrostomy tube, clean both connections with alcohol swap  Twist off the drainage bag tube and twist the saline syringe into the nephrostomy tube and flush briskly  Remove the syringe and twist the drainage bag tube back into the nephrostomy tube  Keep the site covered while you shower  Tape a piece of clear adhesive plastic over the dressing to keep it dry while you shower  Do not take tub baths  Contact Interventional Radiology at [phone]  if:  The skin around the nephrostomy tube is red, swollen, itches, or has a rash     You have a fever greater than 101 or chills  You have lower back or hip pain  There are changes in how your urine looks or smells  You have little or no urine draining from the nephrostomy tube  You have nausea and are vomiting  The black patel on your tube has moved, or the tube is longer than when it was put in  You have questions or concerns about your condition or care  The nephrostomy tube comes out completely  There is blood, pus, or a bad smell coming from the place where the tube enters your skin  Urine is leaking around the tube  The following pharmacies carry the flush syringes  [facility]                     [facility]       [address] [facility]                    [id number] [place] [address] [address] [employer]  Phone [phone]            Phone [phone]  [address] & [facility]                      [address]                                 [phone]  Phone [phone]            Phone [phone]    [facility]                                                                         [facility] [phone]  [address]    [facility] [address] [employer]   Phone [phone]           PLS FU WITH ONCOLOGY/UROLOGY AS AN OUTPATIENT

## 2022-07-28 NOTE — ASSESSMENT & PLAN NOTE
Patient presents with gross hematuria x 1m  Had had prostate biopsy in past, last bx 10/2021, cytology negative  Follows with a urology in [address] [place] [address] Blvd    · CT a/p 07/27: asymmetrically enlarged prostate gland; left side is larger  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  Bilateral hydronephrosis likely from obstruction at the UVJ secondary to enlarged/invading prostate  No renal stones     · Hold aspirin/nsaid   · Urinary retention protocol  · Urology consult in am

## 2022-07-28 NOTE — CONSULTS
UROLOGY CONSULTATION NOTE     Patient Identifiers: Janice Mcdaniel (MRN [id number])  Service Requesting Consultation: Internal Medicine  Service Providing Consultation:  Urology, Makeda Shine    Date of Service: 7/28/2022  Inpatient consult to Urology  Consult performed by: TRICIA Shine  Consult ordered by: BART Avalos      Reason for Consultation: Hematuria, acute kidney injury    ASSESSMENT/PLAN:     Gross hematuria  · Resolved  · Measure PVR and document to ensure complete bladder emptying    Acute kidney injury  · Creatinine 1 5  · Monitor urine output  · IV fluids per Medicine  · Avoid nephrotoxic agents  · Continue to trend renal function    Blood loss anemia  · Hemoglobin on admission 6 6 currently at 5 8  · Transfusion parameters per Medicine    Abnormal Digital Rectal Exam (probable/presumed prostate cancer)  · KAREL-firm prostate with mid leftt nodule noted  · PSA ordered  · CT abdomen pelvis performed 07/28/2022 reveals a asymmetrically enlarged prostate left side greater than right  Patient is noted to have a 1 cm nodule in the right mid lobe not seen on prior exam 06/22/2020 concerning for metastasis  Diffuse sclerosis throughout osseous pelvis and health 3, L2, T12, T11, T10 and T8 vertebral bodies concerning were metastasis  · If pt agrees to insertion of Bilateral PCN will consul IR and pt will need to be scheduled for outpatient follow up for prostate biopsy and further management  Per daughter, she would like for his care to be managed by  Urology    Hydronephrosis  · Secondary to above  · Will discuss with patient options of insertion of bilateral percutaneous nephrostomy tubes or TURP with stent placement  If patient decides to proceed with TURP and stent insertion he will need to wait until blood count improves as his most recent hemoglobin 5 8    In this event, patient will need to be transfused with packed RBCs and scheduled for transfer to [facility]  · Discussed options with pt and family  Will determine course of action upon daughter's arrival to emergency department  · After discussion with daughter and family, they agree with bilateral PCN insertion and outpatient follow up  TT message sent to IR on call provider  IR consult placed  Elevated PSA and work up results not readily available from [place]  Discussed wth [facility] attending and Dr Jaylene Estrella      History of Present Illness:     Juan Jang is a 67 y o  old male presenting to the emergency department with weakness to bilateral upper lower extremities, difficulty walking and gross hematuria  Patient reports hematuria to be a chronic occurring greater than 1 month  He has a history of benign prostatic hyperplasia and follows with a urologist in [place]  Patient reports a history of elevation to PSA with recent intervention by home urologist   Unfortunately, these results were not readily available for review  He denies a family history of prostate cancer  Past Medical, Past Surgical History:     Past Medical History:   Diagnosis Date    Gout     Hypertension    :    History reviewed  No pertinent surgical history :    Medications, Allergies:     Current Facility-Administered Medications   Medication Dose Route Frequency    sodium chloride 0 9 % infusion  75 mL/hr Intravenous Continuous       Allergies:  No Known Allergies:    Social and Family History:   Social History:   Social History     Tobacco Use    Smoking status: Former Smoker     Types: Cigarettes    Smokeless tobacco: Never Used    Tobacco comment: quit 24yrs ago   Vaping Use    Vaping Use: Never used   Substance Use Topics    Alcohol use: Yes    Drug use: Never         Social History     Tobacco Use   Smoking Status Former Smoker    Types: Cigarettes   Smokeless Tobacco Never Used   Tobacco Comment    quit 24yrs ago       Family History:  Family History   Problem Relation Age of Onset    Diabetes Mother    Nafisa Skinner Hypertension Mother     Mental illness Mother     Heart attack Son     Heart attack Daughter    :     Review of Systems:     General: Fever, chills, or night sweats: negative  Cardiac: Negative for chest pain  Pulmonary: Negative for shortness of breath  Gastrointestinal: Abdominal pain negative  Nausea, vomiting, or diarrhea negative,  Genitourinary: See HPI above  Patient does have hematuria  All other systems queried were negative  Physical Exam:   General: Patient is pleasant and in NAD  Awake and alert  /70 (BP Location: Right arm)   Pulse 74   Temp 98 °F (36 7 °C) (Oral)   Resp (!) 32   SpO2 100% Temp (24hrs), Av 1 °F (36 7 °C), Min:98 °F (36 7 °C), Max:98 2 °F (36 8 °C)  current; Temperature: 98 °F (36 7 °C)  No intake/output data recorded  Skin: warm, dry, intact  Cardiac: S1S2, HRR, Peripheral edema: negative  Pulmonary: Non-labored breathing  Abdomen: Soft, non-tender, non-distended  No surgical scars  No masses, tenderness, hernias noted  Musculoskeletal: AROM with no joint deformity or tenderness  Neurology: alert, oriented x3, affect appropriate, no focal neurological deficits, moves all extremities well and no involuntary movements  Genitourinary: Negative CVA tenderness, negative suprapubic tenderness  (Male):  KAREL: Prostate is enlarged at 45-50 grams  The prostate is not boggy  The prostate is not tender  Mid left, firm nodules noted      THORNTON: none     Labs:     Lab Results   Component Value Date    HGB 6 6 (LL) 2022    HCT 20 6 (L) 2022    WBC 9 42 2022     (L) 2022       Lab Results   Component Value Date    K 4 2 2022     (H) 2022    CO2 19 (L) 2022    BUN 27 (H) 2022    CREATININE 1 18 2022    CALCIUM 8 2 (L) 2022       Imaging:   I personally reviewed the images and report of the following studies, and reviewed them with the patient:    CT ABDOMEN AND PELVIS WITHOUT IV CONTRAST     INDICATION:   looking for metastatic prostate cancer      COMPARISON:  None      TECHNIQUE:  CT examination of the abdomen and pelvis was performed without intravenous contrast  Axial, sagittal, and coronal 2D reformatted images were created from the source data and submitted for interpretation       Radiation dose length product (DLP) for this visit:  687 mGy-cm   This examination, like all CT scans performed in the [facility], was performed utilizing techniques to minimize radiation dose exposure, including the use of iterative   reconstruction and automated exposure control       Enteric contrast was not administered       FINDINGS: Lack of IV and oral contrast limits evaluation      ABDOMEN     LOWER CHEST: 1 cm nodule in the right middle lobe not seen on the prior exam from 6/22/2020 concerning for metastasis  Trace bilateral effusions      LIVER/BILIARY TREE:  Unremarkable      GALLBLADDER:  No calcified gallstones  No pericholecystic inflammatory change      SPLEEN:  Unremarkable      PANCREAS:  Unremarkable      ADRENAL GLANDS:  Unremarkable      KIDNEYS/URETERS:  Unremarkable  Bilateral hydronephrosis      STOMACH AND BOWEL:  There is colonic diverticulosis without evidence of acute diverticulitis      APPENDIX:  A normal appendix was visualized      ABDOMINOPELVIC CAVITY:  No ascites  No pneumoperitoneum  There appears to be decreased signal adenopathy measuring 1 6 cm in the short axis (2:61)  Shotty periaortic adenopathy is noted        VESSELS:  Unremarkable for patient's age      PELVIS     REPRODUCTIVE ORGANS:  Asymmetrically enlarged prostate gland; left side is larger        URINARY BLADDER:  Bladder wall thickening likely hypertrophy however may represent cystitis in the appropriate clinical setting  Posterior aspect is invaded by the prostate gland        ABDOMINAL WALL/INGUINAL REGIONS:  Unremarkable      OSSEOUS STRUCTURES: Diffuse sclerosis throughout the osseous pelvis and in the L3, L2, T12, T11, T10, and T8 vertebral bodies is concerning for metastasis  No acute fracture      IMPRESSION:     Asymmetrically enlarged prostate gland; left side is larger     Bladder wall thickening likely hypertrophy however may represent cystitis in the appropriate clinical setting  Posterior aspect is invaded by the prostate gland  Bilateral hydronephrosis   likely from obstruction at the UVJ secondary to enlarged/invading prostate  No renal stones      1 cm nodule in the right middle lobe not seen on the prior exam from 6/22/2020 concerning for metastasis  Trace bilateral effusions      Diffuse sclerosis throughout the osseous pelvis and in the L3, L2, T12, T11, T10, and T8 vertebral bodies is concerning for metastasis      There appears to be decreased signal adenopathy measuring 1 6 cm in the short axis (2:61)  Shotty periaortic adenopathy is noted          Thank you for allowing me to participate in this patients care  Please do not hesitate to call with any additional questions        TRICIA Ruiz

## 2022-07-28 NOTE — ASSESSMENT & PLAN NOTE
hgb 6 6 on admission  Last hgb 13 on labs from 10/2021   Has assoc fatigue   · Likely 2/2 hematuria   · Trend hgb q8hr  · Transfuse for hgb <6 (per blood bank given patients blood type)

## 2022-07-28 NOTE — ASSESSMENT & PLAN NOTE
Cr 1 50  no recent baseline   · CT a/p with above findings  bladder wall thickening likely hypertrophy vs cystitis  Posterior aspect of bladder is invaded by the prostate gland   Bilateral hydronephrosis   · Hold lasix, NSAIDs  · gentle IVFs  · Monitor io, avoid nephrotoxins

## 2022-07-28 NOTE — ED PROVIDER NOTES
History  Chief Complaint   Patient presents with    Paralysis     Family states"patient is feeling paralysis in bilateral arms and bilateral legs"  Family states"this has been going for approx 2 weeks"  Patient also c/o gout flare up, frequent urination, constipation,  and arthritis     44-year-old male patient presents emergency department for evaluation of generalized weakness, malaise, fatigue  The patient was in [address] [place] [address] and having workup done for his prostate and over the last several weeks has been having hematuria  The patient generally states malaise, no chest pains, no shortness of breath, but does state that he has been so weak that he feels that he is dragging his feet trying to walk  He is having some difficulties with ambulation in general because of his generalized weakness  The patient with blood work was found to be anemic at hemoglobin of 6 6  Because he is minimally symptomatic, our blood bank does not feel that he is sufficiently ill to receive a transfusion because of a national shortage of blood  The patient is amenable to blood transfusion  The patient's hemoglobin will be rechecked in the morning and they will make the determination as to whether not the patient requires blood or if iron transfusion may be an alternative for him  The patient otherwise denies chest pains, shortness of breath, nausea, vomiting, fevers, chills  History provided by:  Patient   used: No    Medical Problem  Severity:  Moderate  Onset quality:  Gradual  Timing:  Constant  Progression:  Worsening  Chronicity:  New  Associated symptoms: no cough, no ear pain, no headaches and no sore throat        Prior to Admission Medications   Prescriptions Last Dose Informant Patient Reported?  Taking?   allopurinol (ZYLOPRIM) 100 mg tablet   Yes No   Sig: Take 300 mg by mouth daily    allopurinol (ZYLOPRIM) 300 mg tablet   Yes No   Sig: Take 300 mg by mouth daily   aspirin 81 mg chewable tablet Yes No   Sig: Chew 81 mg daily   brompheniramine-pseudoephedrine-DM 30-2-10 MG/5ML syrup   No No   Sig: Take 5 mL by mouth 4 (four) times a day as needed for cough   fluticasone (FLONASE) 50 mcg/act nasal spray   No No   Si spray into each nostril daily   lisinopril (ZESTRIL) 10 mg tablet   No No   Sig: Take 1 tablet (10 mg total) by mouth daily   methylPREDNISolone 4 MG tablet therapy pack   No No   Sig: Use as directed on package   metoprolol succinate (TOPROL-XL) 50 mg 24 hr tablet   No No   Sig: Take 2 tablets (100 mg total) by mouth daily   multivitamin (THERAGRAN) TABS   No No   Sig: Take 1 tablet by mouth daily   oxybutynin (DITROPAN) 5 mg tablet   Yes No   Sig: Take 5 mg by mouth daily   pantoprazole (PROTONIX) 40 mg tablet   No No   Sig: Take 1 tablet (40 mg total) by mouth daily   promethazine-codeine (PHENERGAN WITH CODEINE) 6 25-10 mg/5 mL syrup   No No   Sig: Take 5 mL by mouth every 4 (four) hours as needed for cough   simvastatin (ZOCOR) 10 mg tablet   No No   Sig: Take 2 tablets (20 mg total) by mouth daily at bedtime   tamsulosin (FLOMAX) 0 4 mg   Yes No   Sig: Take 0 4 mg by mouth daily with dinner      Facility-Administered Medications: None       Past Medical History:   Diagnosis Date    Gout     Hypertension        History reviewed  No pertinent surgical history  Family History   Problem Relation Age of Onset    Diabetes Mother     Hypertension Mother     Mental illness Mother     Heart attack Son     Heart attack Daughter      I have reviewed and agree with the history as documented  E-Cigarette/Vaping    E-Cigarette Use Never User      E-Cigarette/Vaping Substances     Social History     Tobacco Use    Smoking status: Former Smoker     Types: Cigarettes    Smokeless tobacco: Never Used    Tobacco comment: quit 24yrs ago   Vaping Use    Vaping Use: Never used   Substance Use Topics    Alcohol use:  Yes    Drug use: Never       Review of Systems   HENT: Negative for ear pain and sore throat  Respiratory: Negative for cough  Neurological: Negative for headaches  All other systems reviewed and are negative  Physical Exam  Physical Exam  Vitals and nursing note reviewed  Constitutional:       General: He is not in acute distress  Appearance: He is well-developed  He is not diaphoretic  HENT:      Head: Normocephalic and atraumatic  Right Ear: External ear normal       Left Ear: External ear normal    Eyes:      General: No scleral icterus  Right eye: No discharge  Left eye: No discharge  Conjunctiva/sclera: Conjunctivae normal    Neck:      Thyroid: No thyromegaly  Vascular: No JVD  Trachea: No tracheal deviation  Cardiovascular:      Rate and Rhythm: Normal rate and regular rhythm  Pulmonary:      Effort: Pulmonary effort is normal  No respiratory distress  Breath sounds: Normal breath sounds  No stridor  No wheezing or rales  Abdominal:      General: Bowel sounds are normal  There is no distension  Palpations: Abdomen is soft  Tenderness: There is no abdominal tenderness  Musculoskeletal:         General: No tenderness or deformity  Normal range of motion  Cervical back: Normal range of motion and neck supple  Skin:     General: Skin is warm and dry  Neurological:      Mental Status: He is alert and oriented to person, place, and time  Cranial Nerves: No cranial nerve deficit        Coordination: Coordination normal    Psychiatric:         Behavior: Behavior normal          Vital Signs  ED Triage Vitals   Temperature Pulse Respirations Blood Pressure SpO2   07/28/22 0005 07/28/22 0005 07/28/22 0005 07/28/22 0005 07/28/22 0005   98 2 °F (36 8 °C) 78 18 146/77 99 %      Temp Source Heart Rate Source Patient Position - Orthostatic VS BP Location FiO2 (%)   07/28/22 0005 07/28/22 0005 07/28/22 0130 07/28/22 0005 --   Oral Monitor Lying Right arm       Pain Score       --                  Vitals: 07/28/22 0230 07/28/22 0300 07/28/22 0400 07/28/22 0430   BP: 156/74 161/80 162/79 141/68   Pulse: 74 74 76 83   Patient Position - Orthostatic VS:             Visual Acuity      ED Medications  Medications   sodium chloride 0 9 % infusion (has no administration in time range)       Diagnostic Studies  Results Reviewed     Procedure Component Value Units Date/Time    CBC and differential [[id number]]     Lab Status: No result Specimen: Blood     Basic metabolic panel [[id number]]     Lab Status: No result Specimen: Blood     HS Troponin I 4hr [[id number]]  (Normal) Collected: 07/28/22 0326    Lab Status: Final result Specimen: Blood from Arm, Right Updated: 07/28/22 0354     hs TnI 4hr 10 ng/L      Delta 4hr hsTnI 3 ng/L     HS Troponin I 2hr [[id number]]  (Normal) Collected: 07/28/22 0113    Lab Status: Final result Specimen: Blood from Arm, Right Updated: 07/28/22 0151     hs TnI 2hr 8 ng/L      Delta 2hr hsTnI 1 ng/L     Urine Microscopic [[id number]]  (Abnormal) Collected: 07/28/22 0110    Lab Status: Final result Specimen: Urine, Clean Catch Updated: 07/28/22 0135     RBC, UA 10-20 /hpf      WBC, UA 10-20 /hpf      Epithelial Cells Occasional /hpf      Bacteria, UA Occasional /hpf      WBC Clumps present    Urine culture [[id number]] Collected: 07/28/22 0110    Lab Status:  In process Specimen: Urine, Clean Catch Updated: 07/28/22 0135    UA w Reflex to Microscopic w Reflex to Culture [[id number]]  (Abnormal) Collected: 07/28/22 0110    Lab Status: Final result Specimen: Urine, Clean Catch Updated: 07/28/22 0120     Color, UA Yellow     Clarity, UA Clear     Specific [place], UA 1 020     pH, UA 5 5     Leukocytes, UA Small     Nitrite, UA Negative     Protein, UA Trace mg/dl      Glucose, UA Negative mg/dl      Ketones, UA Negative mg/dl      Urobilinogen, UA 0 2 E U /dl      Bilirubin, UA Negative     Occult Blood, UA Large    Protime-INR [[id number]]  (Abnormal) Collected: 07/27/22 2307    Lab Status: Final result Specimen: Blood from Arm, Right Updated: 07/28/22 0004     Protime 14 8 seconds      INR 1 18    APTT [[id number]]  (Normal) Collected: 07/27/22 2307    Lab Status: Final result Specimen: Blood from Arm, Right Updated: 07/28/22 0004     PTT 27 seconds     Comprehensive metabolic panel [[id number]]  (Abnormal) Collected: 07/27/22 2307    Lab Status: Final result Specimen: Blood from Arm, Right Updated: 07/27/22 2353     Sodium 139 mmol/L      Potassium 4 1 mmol/L      Chloride 108 mmol/L      CO2 19 mmol/L      ANION GAP 12 mmol/L      BUN 31 mg/dL      Creatinine 1 50 mg/dL      Glucose 103 mg/dL      Calcium 8 8 mg/dL      Corrected Calcium 9 3 mg/dL      AST 41 U/L      ALT 11 U/L      Alkaline Phosphatase 1,044 U/L      Total Protein 6 9 g/dL      Albumin 3 4 g/dL      Total Bilirubin 0 31 mg/dL      eGFR 45 ml/min/1 73sq m     Narrative:      [place] guidelines for Chronic Kidney Disease (CKD):     Stage 1 with normal or high GFR (GFR > 90 mL/min/1 73 square meters)    Stage 2 Mild CKD (GFR = 60-89 mL/min/1 73 square meters)    Stage 3A Moderate CKD (GFR = 45-59 mL/min/1 73 square meters)    Stage 3B Moderate CKD (GFR = 30-44 mL/min/1 73 square meters)    Stage 4 Severe CKD (GFR = 15-29 mL/min/1 73 square meters)    Stage 5 End Stage CKD (GFR <15 mL/min/1 73 square meters)  Note: GFR calculation is accurate only with a steady state creatinine    HS Troponin 0hr (reflex protocol) [[id number]]  (Normal) Collected: 07/27/22 2307    Lab Status: Final result Specimen: Blood from Arm, Right Updated: 07/27/22 2338     hs TnI 0hr 7 ng/L     CBC and differential [[id number]]  (Abnormal) Collected: 07/27/22 2307    Lab Status: Final result Specimen: Blood from Arm, Right Updated: 07/27/22 2336     WBC 9 42 Thousand/uL      RBC 2 23 Million/uL      Hemoglobin 6 6 g/dL      Hematocrit 20 6 %      MCV 92 fL      MCH 29 1 pg      MCHC 31 6 g/dL      RDW 17 1 %      MPV 12 8 fL      Platelets 682 Thousands/uL      nRBC 0 /100 WBCs      Neutrophils Relative 69 %      Immat GRANS % 1 %      Lymphocytes Relative 21 %      Monocytes Relative 7 %      Eosinophils Relative 2 %      Basophils Relative 0 %      Neutrophils Absolute 6 50 Thousands/µL      Immature Grans Absolute 0 05 Thousand/uL      Lymphocytes Absolute 2 02 Thousands/µL      Monocytes Absolute 0 64 Thousand/µL      Eosinophils Absolute 0 18 Thousand/µL      Basophils Absolute 0 03 Thousands/µL                  CT abdomen pelvis wo contrast   Final Result by Cyndy Butler MD (07/28 2329)      Asymmetrically enlarged prostate gland; left side is larger     Bladder wall thickening likely hypertrophy however may represent cystitis in the appropriate clinical setting  Posterior aspect is invaded by the prostate gland  Bilateral hydronephrosis    likely from obstruction at the UVJ secondary to enlarged/invading prostate  No renal stones  1 cm nodule in the right middle lobe not seen on the prior exam from 6/22/2020 concerning for metastasis  Trace bilateral effusions  Diffuse sclerosis throughout the osseous pelvis and in the L3, L2, T12, T11, T10, and T8 vertebral bodies is concerning for metastasis  There appears to be decreased signal adenopathy measuring 1 6 cm in the short axis (2:61)  Shotty periaortic adenopathy is noted            Workstation performed: [id number]         XR chest 1 view portable    (Results Pending)   XR shoulder 2+ vw right    (Results Pending)              Procedures  Procedures         ED Course                                             MDM  Number of Diagnoses or Management Options  Anemia: new and requires workup  Hematuria: new and requires workup  Patient Progress  Patient progress: stable      Disposition  Final diagnoses:   Hematuria   Anemia     Time reflects when diagnosis was documented in both MDM as applicable and the Disposition within this note     Time User Action Codes Description Comment 7/28/2022  3:48 AM Ian Montgomery Add [R31 9] Hematuria     7/28/2022  5:01 AM Sabrina French Add [D64 9] Anemia     7/28/2022  5:01 AM Sabrina French Modify [R31 9] Hematuria       ED Disposition     ED Disposition   Admit    Condition   Stable    Date/Time   u Jul 28, 2022  3:47 AM    Comment   Case was discussed with [facility] and the patient's admission status was agreed to be Admission Status: observation status to the service of Dr Eddy Valdivia   Follow-up Information    None         Patient's Medications   Discharge Prescriptions    No medications on file       No discharge procedures on file      PDMP Review     None          ED Provider  Electronically Signed by           Jacinda Bansal DO  07/28/22 5771

## 2022-07-28 NOTE — ED NOTES
Received patient on stretcher alert with family at bedside, no distress noted     Marie Jarvis RN  07/28/22 9711

## 2022-07-28 NOTE — CONSULTS
e-Consult (IPC)  - Interventional Radiology  Urmila Valerio 67 y o  male MRN: [id number]  Unit/Bed#: ED 25 Encounter: [id number]    Interventional Radiology has been consulted to evaluate Urmila Valerio    We were consulted by Riley Heredia from Urology concerning this patient with an enlarged prostate and bladder outlet obstruction  IP Consult to IR  Consult performed by: Marco Antonio Hernandez MD  Consult ordered by: TRICIA Beebe        07/28/22    Assessment/Recommendation:   67 y o  male presented today to the ED with weakness, anemia and hematuria found to have bilateral hydronephrosis and an enlarged prostate likely representing prostate CA (PSA pending) in need of bilateral nephrostomy tube placement today or tomorrow  He is NPO  Will consult anesthesia and try to perform the procedure this afternoon or tomorrow  Hgb 5 8 getting 1 unit PRBC's this afternoon  Creatinine was 1 5 but is now 1 18  Patient will be seen by urology in the office for possible biopsy and discussion of treatment for his prostate  Tubes will stay in until after prostate is treated  Total time spent in review of data, discussion with requesting provider and rendering advice was 20 minutes  Thank you for allowing Interventional Radiology to participate in the care of Urmila Valerio  Please don't hesitate to call or TigerText us with any questions       Marco Antonio Hernandez MD

## 2022-07-28 NOTE — ED NOTES
Type O blood shortage, cannot receive transfusion unless HGB is lower than [address]  07/28/22 5611

## 2022-07-29 LAB
ABO GROUP BLD BPU: NORMAL
BACTERIA UR CULT: ABNORMAL
BPU ID: NORMAL
CROSSMATCH: NORMAL
HCT VFR BLD AUTO: 16.9 % (ref 36.5–49.3)
HCT VFR BLD AUTO: 18.2 % (ref 36.5–49.3)
HGB BLD-MCNC: 5.5 G/DL (ref 12–17)
HGB BLD-MCNC: 6 G/DL (ref 12–17)
PSA SERPL-MCNC: 135.7 NG/ML (ref 0–4)
UNIT DISPENSE STATUS: NORMAL
UNIT PRODUCT CODE: NORMAL
UNIT PRODUCT VOLUME: 350 ML
UNIT RH: NORMAL

## 2022-07-29 PROCEDURE — 85018 HEMOGLOBIN: CPT | Performed by: INTERNAL MEDICINE

## 2022-07-29 PROCEDURE — 85014 HEMATOCRIT: CPT | Performed by: INTERNAL MEDICINE

## 2022-07-29 PROCEDURE — 99232 SBSQ HOSP IP/OBS MODERATE 35: CPT | Performed by: NURSE PRACTITIONER

## 2022-07-29 PROCEDURE — P9016 RBC LEUKOCYTES REDUCED: HCPCS

## 2022-07-29 PROCEDURE — 30233N1 TRANSFUSION OF NONAUTOLOGOUS RED BLOOD CELLS INTO PERIPHERAL VEIN, PERCUTANEOUS APPROACH: ICD-10-PCS | Performed by: FAMILY MEDICINE

## 2022-07-29 RX ADMIN — TAMSULOSIN HYDROCHLORIDE 0.4 MG: 0.4 CAPSULE ORAL at 18:39

## 2022-07-29 RX ADMIN — SODIUM CHLORIDE 75 ML/HR: 9 INJECTION, SOLUTION INTRAVENOUS at 16:52

## 2022-07-29 RX ADMIN — FINASTERIDE 5 MG: 5 TABLET, FILM COATED ORAL at 11:50

## 2022-07-29 RX ADMIN — METOPROLOL SUCCINATE 100 MG: 100 TABLET, EXTENDED RELEASE ORAL at 11:50

## 2022-07-29 RX ADMIN — ALLOPURINOL 300 MG: 300 TABLET ORAL at 11:51

## 2022-07-29 RX ADMIN — SODIUM CHLORIDE 75 ML/HR: 9 INJECTION, SOLUTION INTRAVENOUS at 22:02

## 2022-07-29 RX ADMIN — ACETAMINOPHEN 650 MG: 325 TABLET ORAL at 11:51

## 2022-07-29 RX ADMIN — PRAVASTATIN SODIUM 40 MG: 40 TABLET ORAL at 18:39

## 2022-07-29 NOTE — UTILIZATION REVIEW
Inpatient Admission Authorization Request   NOTIFICATION OF INPATIENT ADMISSION/INPATIENT AUTHORIZATION REQUEST   SERVICING FACILITY:   [address] [place] [address]  Tax ID: [id number]  NPI: [id number]  Place of Service: Inpatient [facility]  60[address]  Place of Service Code: 24     ATTENDING PROVIDER:  Attending Name and NPI#: Lico Paintingoba [[id number]]  Address: [address] [place] [address]  Phone: [phone]     UTILIZATION REVIEW CONTACT:  Ronn Bustamante, Utilization   Network Utilization Review Department  Phone: [phone]  Fax [phone]  Email: [email or web address]     PHYSICIAN ADVISORY SERVICES:  FOR [id number]-[id number]-JLLR REVIEW - MEDICAL NECESSITY DENIAL  Phone: [phone]  Fax: [phone]  Email: [email or web address]     TYPE OF REQUEST:  Inpatient Status     ADMISSION INFORMATION:  ADMISSION DATE/TIME: 7/28/22  3:07 PM  PATIENT DIAGNOSIS CODE/DESCRIPTION:  Anemia [D64 9]  Paralysis ([address] ) [G83 9]  Hematuria [R31 9]  Other hydronephrosis [N13 39]  Benign prostatic hyperplasia without lower urinary tract symptoms [N40 0]  DISCHARGE DATE/TIME: No discharge date for patient encounter  IMPORTANT INFORMATION:  Please contact Ronn Bustamante directly with any questions or concerns regarding this request  Department voicemails are confidential     Send requests for admission clinical reviews, concurrent reviews, approvals, and administrative denials due to lack of clinical to fax [phone]

## 2022-07-29 NOTE — PROGRESS NOTES
Progress Note - Miguel Craven 67 y o  male MRN: [id number]    Unit/Bed#: -01 Encounter: [id number]      Assessment:  Miguel Craven is a 76-year-old Chinese-speaking male evaluated previously in [place] by urologist for elevated PSA and BPH, management is unclear  Presented to [facility] with chief complaint of chronic hematuria, generalized weakness with workup consistent with anemia and acute kidney injury  He was seen in urologic consultation for CT findings of bilateral hydronephrosis with irregular prostate, evidence metastasis  Previous postprocedure day 1 IR placement of bilateral percutaneous nephrostomies  He is afebrile, hemodynamically stable and without complaints of abdominal, flank or suprapubic pain  Bilateral PCNs in-situ and patent for cherry red urine  No clots  PCNs flushing well  He will receive transfusion today for hemoglobin of 5 5 from 6 8 yesterday  Urine culture pending  Renal function now within normal limits  Unfortunately, PSA was 135 7  Plan:  · Continue medical optimization  Appreciate transfusions per the Internal Medicine team   · Follow H&H   · Maintain bilateral PCNs to straight drainage and flush per IR protocol  · Given patient is anemic, do not foresee discharge within the next 24-48 hours  · Will discuss with attending will for initiating premptive Casodex, proceeding with IR bone biopsy early next week, role for bone scan and/or medical-oncology evaluation  · No role for emergent intervention at this time  Will update      Subjective:   Denies fever, chills, flank, abdominal or suprapubic pain    Objective:     Vitals: Blood pressure 136/65, pulse 103, temperature 98 1 °F (36 7 °C), resp  rate 18, height 5' 10" (1 778 m), weight 93 kg (205 lb 0 4 oz), SpO2 100 %  ,Body mass index is 29 42 kg/m²        Intake/Output Summary (Last 24 hours) at 7/29/2022 0840  Last data filed at 7/29/2022 0513  Gross per 24 hour   Intake 2965 ml   Output 835 ml   Net 2130 ml       Physical Exam: General appearance: alert and oriented, in no acute distress, appears stated age, cooperative and no distress  Head: Normocephalic, without obvious abnormality, atraumatic  Neck: no adenopathy, no carotid bruit, no JVD, supple, symmetrical, trachea midline and thyroid not enlarged, symmetric, no tenderness/mass/nodules  Lungs: diminished breath sounds  Heart: regular rate and rhythm, S1, S2 normal, no murmur, click, rub or gallop  Abdomen: soft, non-tender; bowel sounds normal; no masses,  no organomegaly  Extremities: extremities normal, warm and well-perfused; no cyanosis, clubbing, or edema  Pulses: 2+ and symmetric  Neurologic: Grossly normal  Bilateral PCNs--cherry  Invasive Devices  Report    Peripheral Intravenous Line  Duration           Peripheral IV 07/28/22 Right Wrist 1 day          Drain  Duration           Nephrostomy Left 10 Fr  <1 day    Nephrostomy Right 10 Fr  <1 day              Lab Results   Component Value Date    WBC 8 42 07/28/2022    HGB 5 5 (LL) 07/29/2022    HCT 16 9 (L) 07/29/2022    MCV 91 07/28/2022     (L) 07/28/2022     Lab Results   Component Value Date    SODIUM 139 07/28/2022    K 4 7 07/28/2022     (H) 07/28/2022    CO2 17 (L) 07/28/2022    BUN 22 07/28/2022    CREATININE 1 07 07/28/2022    GLUC 103 07/28/2022    CALCIUM 8 5 07/28/2022         Lab, Imaging and other studies: I have personally reviewed pertinent reports

## 2022-07-29 NOTE — NURSING NOTE
Patient was unable to receive full unit of blood  After about 5-10 minutes of initiation of transfusion this morning, patient's IV infiltrated  ICU came and placed new one with ultrasound  Took numerous attempts for staff to get IV access  Once new IV placed, transfusion restarted  Patient's IV infiltrated again within about 45 minutes of transfusion being restarted  Charge nurse called ICU requesting staff member to place new IV ASAP due to blood being needed to finish being transfused by [id number]  New IV placed after 1348, blood wasted  Dr Apoorva Aden was made aware of delays in transfusion due to IV infiltration

## 2022-07-30 LAB
ABO GROUP BLD BPU: NORMAL
BACTERIA UR CULT: NORMAL
BACTERIA UR CULT: NORMAL
BPU ID: NORMAL
CROSSMATCH: NORMAL
UNIT DISPENSE STATUS: NORMAL
UNIT PRODUCT CODE: NORMAL
UNIT PRODUCT VOLUME: 350 ML
UNIT RH: NORMAL

## 2022-07-30 PROCEDURE — 99232 SBSQ HOSP IP/OBS MODERATE 35: CPT | Performed by: INTERNAL MEDICINE

## 2022-07-30 PROCEDURE — 99232 SBSQ HOSP IP/OBS MODERATE 35: CPT | Performed by: NURSE PRACTITIONER

## 2022-07-30 RX ORDER — CEFTRIAXONE 1 G/50ML
1000 INJECTION, SOLUTION INTRAVENOUS EVERY 24 HOURS
Status: DISCONTINUED | OUTPATIENT
Start: 2022-07-30 | End: 2022-08-07

## 2022-07-30 RX ADMIN — SODIUM CHLORIDE 75 ML/HR: 9 INJECTION, SOLUTION INTRAVENOUS at 15:16

## 2022-07-30 RX ADMIN — ALLOPURINOL 300 MG: 300 TABLET ORAL at 09:01

## 2022-07-30 RX ADMIN — CEFTRIAXONE 1000 MG: 1 INJECTION, SOLUTION INTRAVENOUS at 10:05

## 2022-07-30 RX ADMIN — FINASTERIDE 5 MG: 5 TABLET, FILM COATED ORAL at 09:01

## 2022-07-30 RX ADMIN — PRAVASTATIN SODIUM 40 MG: 40 TABLET ORAL at 16:50

## 2022-07-30 RX ADMIN — METOPROLOL SUCCINATE 100 MG: 100 TABLET, EXTENDED RELEASE ORAL at 09:01

## 2022-07-30 RX ADMIN — TAMSULOSIN HYDROCHLORIDE 0.4 MG: 0.4 CAPSULE ORAL at 16:50

## 2022-07-30 NOTE — ASSESSMENT & PLAN NOTE
· Hgb 6 6 on admission  Last hgb 13 on labs from 10/2021   Has assoc fatigue   · Likely 2/2 hematuria   · Hematuria since resolved  · Status post 3 units packed red blood cells  · Most recent hemoglobin 6 0 follow-up hemoglobin pending  · Given significant anemia will consult oncology/hematology

## 2022-07-30 NOTE — PROGRESS NOTES
[facility]  Progress Note - Sidney Rosario 1950, 67 y o  male MRN: [id number]  Unit/Bed#: -[place] Encounter: [id number]  Primary Care Provider: TRICIA Shaffer   Date and time admitted to hospital: 7/27/2022 11:40 PM    * Acute blood loss anemia  Assessment & Plan  · Hgb 6 6 on admission  Last hgb 13 on labs from 10/2021  Has assoc fatigue   · Likely 2/2 hematuria   · Hematuria since resolved  · Status post 3 units packed red blood cells  · Most recent hemoglobin 6 0 follow-up hemoglobin pending  · Given significant anemia will consult oncology/hematology     MICA (acute kidney injury) ([facility] [address] )  Assessment & Plan  · Secondary to by hydronephrosis  · Presented creatinine 1 5  · Has since resolved  · Currently 1 1    Right shoulder pain  Assessment & Plan  Limited mobility due to pain  Worse with shoulder extension  XR R shoulder   Tylenol prn it    Metastasis of unknown origin [facility])  Assessment & Plan  · CT a/p 07/27: revealed 1 cm nodule in the right middle lobe not seen on the prior exam from 6/22/2020 concerning for metastasis and diffuse sclerosis throughout the osseous pelvis and in the L3, L2, T12, T11, T10, and T8 vertebral bodies also concerning for metastasis   · Elevated alk phos  · Prostate likely source   · Would benefit from oncology consultation     Gross hematuria  Assessment & Plan  · Patient presents with gross hematuria x 1m  Had had prostate biopsy in past, last bx 10/2021, cytology negative  Follows with a urology in [address] [place] [address]    · CT a/p 07/27: asymmetrically enlarged prostate gland; left side is larger  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  Bilateral hydronephrosis likely from obstruction at the UVJ secondary to enlarged/invading prostate  No renal stones  · S/p B/l PCN by IR  · Hold aspirin/nsaid   · Urinary retention protocol  · S/p 3 units prbc    Continue to monitor h&h closely      VTE Pharmacologic Prophylaxis:   Pharmacologic: Pharmacologic VTE Prophylaxis contraindicated due to Acute anemia  Mechanical VTE Prophylaxis in Place: Yes    Patient Centered Rounds: I have performed bedside rounds with nursing staff today  Discussions with Specialists or Other Care Team Provider: amarilys razo    Education and Discussions with Family / Patient: pt    Time Spent for Care: 30 minutes  More than 50% of total time spent on counseling and coordination of care as described above  Current Length of Stay: 2 day(s)    Current Patient Status: Inpatient   Certification Statement: The patient will continue to require additional inpatient hospital stay due to See below    Discharge Plan:  Still requiring further evaluation and treatment of acute anemia    Code Status: Level 1 - Full Code      Subjective:   Denies chest pain, sob, cough, fevers    Objective:     Vitals:   Temp (24hrs), Av 6 °F (37 °C), Min:97 4 °F (36 3 °C), Max:99 5 °F (37 5 °C)    Temp:  [97 4 °F (36 3 °C)-99 5 °F (37 5 °C)] 97 4 °F (36 3 °C)  HR:  [] 79  Resp:  [16-22] 19  BP: (104-142)/() 139/65  SpO2:  [70 %-100 %] 96 %  Body mass index is 29 42 kg/m²  Input and Output Summary (last 24 hours): Intake/Output Summary (Last 24 hours) at 2022 0905  Last data filed at 2022 0700  Gross per 24 hour   Intake 1613 67 ml   Output 1375 ml   Net 238 67 ml       Physical Exam:     Physical Exam  Constitutional:       General: He is not in acute distress  Appearance: He is well-developed  He is not diaphoretic  HENT:      Head: Normocephalic and atraumatic  Nose: Nose normal       Mouth/Throat:      Pharynx: No oropharyngeal exudate  Eyes:      General: No scleral icterus  Conjunctiva/sclera: Conjunctivae normal    Cardiovascular:      Rate and Rhythm: Normal rate and regular rhythm  Heart sounds: Normal heart sounds  No murmur heard  No friction rub  No gallop     Pulmonary:      Effort: Pulmonary effort is normal  No respiratory distress  Breath sounds: Normal breath sounds  No wheezing or rales  Chest:      Chest wall: No tenderness  Abdominal:      General: Bowel sounds are normal  There is no distension  Palpations: Abdomen is soft  Tenderness: There is no abdominal tenderness  There is no guarding  Musculoskeletal:         General: No tenderness or deformity  Normal range of motion  Cervical back: Normal range of motion and neck supple  Skin:     General: Skin is warm and dry  Findings: No erythema  Neurological:      Mental Status: He is alert  Mental status is at baseline  Additional Data:     Labs:    Results from last 7 days   Lab Units 07/29/22 2216 07/28/22 1722 07/28/22 0619   WBC Thousand/uL  --   --  8 42   HEMOGLOBIN g/dL 6 0*   < > 5 8*   HEMATOCRIT % 18 2*   < > 17 9*   PLATELETS Thousands/uL  --   --  126*   NEUTROS PCT %  --   --  58   LYMPHS PCT %  --   --  27   MONOS PCT %  --   --  11   EOS PCT %  --   --  3    < > = values in this interval not displayed  Results from last 7 days   Lab Units 07/28/22 1722 07/28/22 0619 07/27/22  2307   SODIUM mmol/L 139   < > 139   POTASSIUM mmol/L 4 7   < > 4 1   CHLORIDE mmol/L 110*   < > 108   CO2 mmol/L 17*   < > 19*   BUN mg/dL 22   < > 31*   CREATININE mg/dL 1 07   < > 1 50*   ANION GAP mmol/L 12   < > 12   CALCIUM mg/dL 8 5   < > 8 8   ALBUMIN g/dL  --   --  3 4*   TOTAL BILIRUBIN mg/dL  --   --  0 31   ALK PHOS U/L  --   --  1,044*   ALT U/L  --   --  11*   AST U/L  --   --  41   GLUCOSE RANDOM mg/dL 103   < > 103    < > = values in this interval not displayed  Results from last 7 days   Lab Units 07/27/22  2307   INR  1 18                       * I Have Reviewed All Lab Data Listed Above  * Additional Pertinent Lab Tests Reviewed:  All Labs Within Last 24 Hours Reviewed    Imaging:    Imaging Reports Reviewed Today Include: na  Imaging Personally Reviewed by Myself Includes:  na    Recent Cultures (last 7 days): Results from last 7 days   Lab Units 07/28/22  1444 07/28/22  1424 07/28/22  0110   URINE CULTURE  No Growth <100 cfu/mL No Growth <100 cfu/mL 40,000-49,000 cfu/ml Aerococcus urinae*       Last 24 Hours Medication List:   Current Facility-Administered Medications   Medication Dose Route Frequency Provider Last Rate    acetaminophen  650 mg Oral Q6H PRN Ashtyn Rawls PA-C      allopurinol  300 mg Oral Daily Ashtyn Rawls PA-C      cefTRIAXone  1,000 mg Intravenous Q24H Kirsten Carreno MD      finasteride  5 mg Oral Daily Ashtyn Rawls PA-C      metoprolol succinate  100 mg Oral Daily Ashtyn Rawls [place]      pravastatin  40 mg Oral Daily With [place], [place]      sodium chloride  75 mL/hr Intravenous Continuous Ashtyn Rawls PA-C 75 mL/hr (07/29/22 2202)    tamsulosin  0 4 mg Oral Daily With TRICIA Loredo          Today, Patient Was Seen By: Kirsten Carreno MD    ** Please Note: Dictation voice to text software may have been used in the creation of this document   **

## 2022-07-30 NOTE — ASSESSMENT & PLAN NOTE
· Patient presents with gross hematuria x 1m  Had had prostate biopsy in past, last bx 10/2021, cytology negative  Follows with a urology in [address] [place] [address]    · CT a/p 07/27: asymmetrically enlarged prostate gland; left side is larger  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  Bilateral hydronephrosis likely from obstruction at the UVJ secondary to enlarged/invading prostate  No renal stones  · S/p B/l PCN by IR  · Hold aspirin/nsaid   · Urinary retention protocol  · S/p 3 units prbc    Continue to monitor h&h closely

## 2022-07-30 NOTE — PROGRESS NOTES
Progress Note - Joshua Garcia 67 y o  male MRN: [id number]    Unit/Bed#: -01 Encounter: [id number]      Assessment:  Joshua Garcia is a 16-year-old Telugu-speaking male evaluated previously in [place] by urologist for elevated PSA and BPH, management is unclear  Presented to [facility] with chief complaint of chronic hematuria, generalized weakness with workup consistent with anemia and acute kidney injury  He was seen in urologic consultation for CT findings of bilateral hydronephrosis with irregular prostate, evidence metastasis  Previous postprocedure day 2 IR placement of bilateral percutaneous nephrostomies  He is afebrile, hemodynamically stable and without complaints of abdominal, flank or suprapubic pain  Bilateral PCNs in-situ and patent for marco urine--clearing nicely  No clots  PCNs flushing well  He is anemic transfusion dependent  Last previous hemoglobin was 6 last evening  Patient awaiting PICC placement due to loss of IV access  Morning labs still pending  Urine culture showed a low levels of aerococcus  Additional repeat cultures were negative for growth  Renal function now within normal limits  Unfortunately, PSA was 135 7  Plan:  · Continue medical optimization  Appreciate transfusions per the Internal Medicine team   · Follow H&H   · Maintain bilateral PCNs to straight drainage and flush per IR protocol  · Given patient is anemic, do not foresee discharge within the next 24-48 hours  · Will await input from Hematology-Oncology  Will likely require bone biopsy in the upcoming week  Subjective:   Denies fever, chills, flank, abdominal or suprapubic pain    Objective:     Vitals: Blood pressure 128/65, pulse 79, temperature 98 2 °F (36 8 °C), resp  rate 19, height 5' 10" (1 778 m), weight 93 kg (205 lb 0 4 oz), SpO2 99 %  ,Body mass index is 29 42 kg/m²        Intake/Output Summary (Last 24 hours) at 7/30/2022 6184  Last data filed at 7/30/2022 0700  Gross per 24 hour   Intake 1853 67 ml   Output 1375 ml   Net 478 67 ml       Physical Exam:   General appearance: alert and oriented, in no acute distress, appears stated age, cooperative and no distress  Head: Normocephalic, without obvious abnormality, atraumatic  Neck: no adenopathy, no carotid bruit, no JVD, supple, symmetrical, trachea midline and thyroid not enlarged, symmetric, no tenderness/mass/nodules  Lungs: diminished breath sounds  Heart: regular rate and rhythm, S1, S2 normal, no murmur, click, rub or gallop  Abdomen: soft, non-tender; bowel sounds normal; no masses,  no organomegaly  Extremities: extremities normal, warm and well-perfused; no cyanosis, clubbing, or edema  Pulses: 2+ and symmetric  Neurologic: Grossly normal  Bilateral PCN      Invasive Devices  Report    Peripheral Intravenous Line  Duration           Peripheral IV 07/29/22 Distal;Left;Upper;Ventral (anterior) Arm <1 day    Peripheral IV 07/29/22 Left Antecubital <1 day          Drain  Duration           Nephrostomy Left 10 Fr  1 day    Nephrostomy Right 10 Fr  1 day              Lab Results   Component Value Date    WBC 8 42 07/28/2022    HGB 6 0 (LL) 07/29/2022    HCT 18 2 (L) 07/29/2022    MCV 91 07/28/2022     (L) 07/28/2022     Lab Results   Component Value Date    SODIUM 139 07/28/2022    K 4 7 07/28/2022     (H) 07/28/2022    CO2 17 (L) 07/28/2022    BUN 22 07/28/2022    CREATININE 1 07 07/28/2022    GLUC 103 07/28/2022    CALCIUM 8 5 07/28/2022         Lab, Imaging and other studies: I have personally reviewed pertinent reports

## 2022-07-30 NOTE — PLAN OF CARE
Problem: Potential for Falls  Goal: Patient will remain free of falls  Description: INTERVENTIONS:  - Educate patient/family on patient safety including physical limitations  - Instruct patient to call for assistance with activity   - Consult OT/PT to assist with strengthening/mobility   - Keep Call bell within reach  - Keep bed low and locked with side rails adjusted as appropriate  - Keep care items and personal belongings within reach  - Initiate and maintain comfort rounds  - Make Fall Risk Sign visible to staff  - Offer Toileting every 2 Hours, in advance of need  - Initiate/Maintain alarm  - Obtain necessary fall risk management equipment  - Apply yellow socks and bracelet for high fall risk patients  - Consider moving patient to room near nurses station  Outcome: Progressing     Problem: PAIN - ADULT  Goal: Verbalizes/displays adequate comfort level or baseline comfort level  Description: Interventions:  - Encourage patient to monitor pain and request assistance  - Assess pain using appropriate pain scale  - Administer analgesics based on type and severity of pain and evaluate response  - Implement non-pharmacological measures as appropriate and evaluate response  - Consider cultural and social influences on pain and pain management  - Notify physician/advanced practitioner if interventions unsuccessful or patient reports new pain  Outcome: Progressing     Problem: INFECTION - ADULT  Goal: Absence or prevention of progression during hospitalization  Description: INTERVENTIONS:  - Assess and monitor for signs and symptoms of infection  - Monitor lab/diagnostic results  - Monitor all insertion sites, i e  indwelling lines, tubes, and drains  - Monitor endotracheal if appropriate and nasal secretions for changes in amount and color  - [place] appropriate cooling/warming therapies per order  - Administer medications as ordered  - Instruct and encourage patient and family to use good hand hygiene technique  - Identify and instruct in appropriate isolation precautions for identified infection/condition  Outcome: Progressing  Goal: Absence of fever/infection during neutropenic period  Description: INTERVENTIONS:  - Monitor WBC    Outcome: Progressing     Problem: SAFETY ADULT  Goal: Patient will remain free of falls  Description: INTERVENTIONS:  - Educate patient/family on patient safety including physical limitations  - Instruct patient to call for assistance with activity   - Consult OT/PT to assist with strengthening/mobility   - Keep Call bell within reach  - Keep bed low and locked with side rails adjusted as appropriate  - Keep care items and personal belongings within reach  - Initiate and maintain comfort rounds  - Make Fall Risk Sign visible to staff  - Offer Toileting every 2 Hours, in advance of need  - Initiate/Maintain alarm  - Obtain necessary fall risk management equipment  - Apply yellow socks and bracelet for high fall risk patients  - Consider moving patient to room near nurses station  Outcome: Progressing  Goal: Maintain or return to baseline ADL function  Description: INTERVENTIONS:  - Educate patient/family on patient safety including physical limitations  - Instruct patient to call for assistance with activity   - Consult OT/PT to assist with strengthening/mobility   - Keep Call bell within reach  - Keep bed low and locked with side rails adjusted as appropriate  - Keep care items and personal belongings within reach  - Initiate and maintain comfort rounds  - Make Fall Risk Sign visible to staff  - Offer Toileting every 2 Hours, in advance of need  - Initiate/Maintain alarm  - Obtain necessary fall risk management equipment  - Apply yellow socks and bracelet for high fall risk patients  - Consider moving patient to room near nurses station  Outcome: Progressing  Goal: Maintains/Returns to pre admission functional level  Description: INTERVENTIONS:  - Perform BMAT or MOVE assessment daily    - Set and communicate daily mobility goal to care team and patient/family/caregiver  - Collaborate with rehabilitation services on mobility goals if consulted  - Perform Range of Motion 3 times a day  - Reposition patient every 2 hours  - Dangle patient 3 times a day  - Stand patient 3 times a day  - Ambulate patient 3 times a day  - Out of bed to chair 3 times a day   - Out of bed for meals 3 times a day  - Out of bed for toileting  - Record patient progress and toleration of activity level   Outcome: Progressing     Problem: DISCHARGE PLANNING  Goal: Discharge to home or other facility with appropriate resources  Description: INTERVENTIONS:  - Identify barriers to discharge w/patient and caregiver  - Arrange for needed discharge resources and transportation as appropriate  - Identify discharge learning needs (meds, wound care, etc )  - Arrange for interpretive services to assist at discharge as needed  - Refer to Case Management Department for coordinating discharge planning if the patient needs post-hospital services based on physician/advanced practitioner order or complex needs related to functional status, cognitive ability, or social support system  Outcome: Progressing     Problem: Knowledge Deficit  Goal: Patient/family/caregiver demonstrates understanding of disease process, treatment plan, medications, and discharge instructions  Description: Complete learning assessment and assess knowledge base    Interventions:  - Provide teaching at level of understanding  - Provide teaching via preferred learning methods  Outcome: Progressing     Problem: HEMATOLOGIC - ADULT  Goal: Maintains hematologic stability  Description: INTERVENTIONS  - Assess for signs and symptoms of bleeding or hemorrhage  - Monitor labs  - Administer supportive blood products/factors as ordered and appropriate  Outcome: Progressing     Problem: MOBILITY - ADULT  Goal: Maintain or return to baseline ADL function  Description: INTERVENTIONS:  - Educate patient/family on patient safety including physical limitations  - Instruct patient to call for assistance with activity   - Consult OT/PT to assist with strengthening/mobility   - Keep Call bell within reach  - Keep bed low and locked with side rails adjusted as appropriate  - Keep care items and personal belongings within reach  - Initiate and maintain comfort rounds  - Make Fall Risk Sign visible to staff  - Offer Toileting every 2 Hours, in advance of need  - Initiate/Maintain alarm  - Obtain necessary fall risk management equipment  - Apply yellow socks and bracelet for high fall risk patients  - Consider moving patient to room near nurses station  Outcome: Progressing  Goal: Maintains/Returns to pre admission functional level  Description: INTERVENTIONS:  - Perform BMAT or MOVE assessment daily    - Set and communicate daily mobility goal to care team and patient/family/caregiver  - Collaborate with rehabilitation services on mobility goals if consulted  - Perform Range of Motion 3 times a day  - Reposition patient every 2 hours    - Dangle patient 3 times a day  - Stand patient 3 times a day  - Ambulate patient 3 times a day  - Out of bed to chair 3 times a day   - Out of bed for meals 3 times a day  - Out of bed for toileting  - Record patient progress and toleration of activity level   Outcome: Progressing     Problem: Prexisting or High Potential for Compromised Skin Integrity  Goal: Skin integrity is maintained or improved  Description: INTERVENTIONS:  - Identify patients at risk for skin breakdown  - Assess and monitor skin integrity  - Assess and monitor nutrition and hydration status  - Monitor labs   - Assess for incontinence   - Turn and reposition patient  - Assist with mobility/ambulation  - Relieve pressure over bony prominences  - Avoid friction and shearing  - Provide appropriate hygiene as needed including keeping skin clean and dry  - Evaluate need for skin moisturizer/barrier cream  - Collaborate with interdisciplinary team   - Patient/family teaching  - Consider wound care consult   Outcome: Progressing     Problem: Nutrition/Hydration-ADULT  Goal: Nutrient/Hydration intake appropriate for improving, restoring or maintaining nutritional needs  Description: Monitor and assess patient's nutrition/hydration status for malnutrition  Collaborate with interdisciplinary team and initiate plan and interventions as ordered  Monitor patient's weight and dietary intake as ordered or per policy  Utilize nutrition screening tool and intervene as necessary  Determine patient's food preferences and provide high-protein, high-caloric foods as appropriate       INTERVENTIONS:  - Monitor oral intake, urinary output, labs, and treatment plans  - Assess nutrition and hydration status and recommend course of action  - Evaluate amount of meals eaten  - Assist patient with eating if necessary   - Allow adequate time for meals  - Recommend/ encourage appropriate diets, oral nutritional supplements, and vitamin/mineral supplements  - Order, calculate, and assess calorie counts as needed  - Recommend, monitor, and adjust tube feedings and TPN/PPN based on assessed needs  - Assess need for intravenous fluids  - Provide specific nutrition/hydration education as appropriate  - Include patient/family/caregiver in decisions related to nutrition  Outcome: Progressing

## 2022-07-30 NOTE — QUICK NOTE
Morning blood work was deferred to dayshift due to pt being a difficult stick and three different attempts were made   SLIM made aware

## 2022-07-31 ENCOUNTER — APPOINTMENT (INPATIENT)
Dept: ULTRASOUND IMAGING | Facility: HOSPITAL | Age: 72
DRG: 715 | End: 2022-07-31
Payer: MEDICARE

## 2022-07-31 LAB
ANION GAP SERPL CALCULATED.3IONS-SCNC: 11 MMOL/L (ref 4–13)
ATRIAL RATE: 77 BPM
BASOPHILS # BLD AUTO: 0.02 THOUSANDS/ÂΜL (ref 0–0.1)
BASOPHILS NFR BLD AUTO: 0 % (ref 0–1)
BUN SERPL-MCNC: 21 MG/DL (ref 5–25)
CALCIUM SERPL-MCNC: 8.3 MG/DL (ref 8.3–10.1)
CHLORIDE SERPL-SCNC: 106 MMOL/L (ref 96–108)
CO2 SERPL-SCNC: 19 MMOL/L (ref 21–32)
CREAT SERPL-MCNC: 1.67 MG/DL (ref 0.6–1.3)
EOSINOPHIL # BLD AUTO: 0.21 THOUSAND/ÂΜL (ref 0–0.61)
EOSINOPHIL NFR BLD AUTO: 2 % (ref 0–6)
ERYTHROCYTE [DISTWIDTH] IN BLOOD BY AUTOMATED COUNT: 15.7 % (ref 11.6–15.1)
GFR SERPL CREATININE-BSD FRML MDRD: 39 ML/MIN/1.73SQ M
GLUCOSE SERPL-MCNC: 100 MG/DL (ref 65–140)
HCT VFR BLD AUTO: 23.8 % (ref 36.5–49.3)
HGB BLD-MCNC: 7.8 G/DL (ref 12–17)
IMM GRANULOCYTES # BLD AUTO: 0.06 THOUSAND/UL (ref 0–0.2)
IMM GRANULOCYTES NFR BLD AUTO: 1 % (ref 0–2)
LYMPHOCYTES # BLD AUTO: 1.97 THOUSANDS/ÂΜL (ref 0.6–4.47)
LYMPHOCYTES NFR BLD AUTO: 17 % (ref 14–44)
MCH RBC QN AUTO: 30.7 PG (ref 26.8–34.3)
MCHC RBC AUTO-ENTMCNC: 32.8 G/DL (ref 31.4–37.4)
MCV RBC AUTO: 94 FL (ref 82–98)
MONOCYTES # BLD AUTO: 1.2 THOUSAND/ÂΜL (ref 0.17–1.22)
MONOCYTES NFR BLD AUTO: 11 % (ref 4–12)
NEUTROPHILS # BLD AUTO: 8 THOUSANDS/ÂΜL (ref 1.85–7.62)
NEUTS SEG NFR BLD AUTO: 69 % (ref 43–75)
NRBC BLD AUTO-RTO: 0 /100 WBCS
P AXIS: 68 DEGREES
PLATELET # BLD AUTO: 104 THOUSANDS/UL (ref 149–390)
PMV BLD AUTO: 12.2 FL (ref 8.9–12.7)
POTASSIUM SERPL-SCNC: 5.1 MMOL/L (ref 3.5–5.3)
PR INTERVAL: 146 MS
QRS AXIS: 44 DEGREES
QRSD INTERVAL: 88 MS
QT INTERVAL: 392 MS
QTC INTERVAL: 443 MS
RBC # BLD AUTO: 2.54 MILLION/UL (ref 3.88–5.62)
SODIUM SERPL-SCNC: 136 MMOL/L (ref 135–147)
T WAVE AXIS: 1 DEGREES
VENTRICULAR RATE: 77 BPM
WBC # BLD AUTO: 11.46 THOUSAND/UL (ref 4.31–10.16)

## 2022-07-31 PROCEDURE — 85025 COMPLETE CBC W/AUTO DIFF WBC: CPT | Performed by: INTERNAL MEDICINE

## 2022-07-31 PROCEDURE — 80048 BASIC METABOLIC PNL TOTAL CA: CPT | Performed by: INTERNAL MEDICINE

## 2022-07-31 PROCEDURE — 76770 US EXAM ABDO BACK WALL COMP: CPT

## 2022-07-31 PROCEDURE — 93010 ELECTROCARDIOGRAM REPORT: CPT | Performed by: INTERNAL MEDICINE

## 2022-07-31 PROCEDURE — 99232 SBSQ HOSP IP/OBS MODERATE 35: CPT | Performed by: INTERNAL MEDICINE

## 2022-07-31 RX ADMIN — PRAVASTATIN SODIUM 40 MG: 40 TABLET ORAL at 17:29

## 2022-07-31 RX ADMIN — METOPROLOL SUCCINATE 100 MG: 100 TABLET, EXTENDED RELEASE ORAL at 08:46

## 2022-07-31 RX ADMIN — ALLOPURINOL 300 MG: 300 TABLET ORAL at 08:46

## 2022-07-31 RX ADMIN — SODIUM CHLORIDE 75 ML/HR: 9 INJECTION, SOLUTION INTRAVENOUS at 19:12

## 2022-07-31 RX ADMIN — TAMSULOSIN HYDROCHLORIDE 0.4 MG: 0.4 CAPSULE ORAL at 17:29

## 2022-07-31 RX ADMIN — SODIUM CHLORIDE 75 ML/HR: 9 INJECTION, SOLUTION INTRAVENOUS at 05:15

## 2022-07-31 RX ADMIN — CEFTRIAXONE 1000 MG: 1 INJECTION, SOLUTION INTRAVENOUS at 08:47

## 2022-07-31 RX ADMIN — FINASTERIDE 5 MG: 5 TABLET, FILM COATED ORAL at 08:47

## 2022-07-31 NOTE — PROGRESS NOTES
[facility]  Progress Note - [place] 1950, 67 y o  male MRN: [id number]  Unit/Bed#: -[place] Encounter: [id number]  Primary Care Provider: TRICIA Villareal   Date and time admitted to hospital: 7/27/2022 11:40 PM    * Acute blood loss anemia  Assessment & Plan  Hgb 6 6 on admission  Last hgb 13 on labs from 10/2021  Has assoc fatigue   Likely 2/2 hematuria   Hematuria since resolved  Status post 3 units packed red blood cells  Has since stabilized at approximately 8    MICA (acute kidney injury) [facility])  Assessment & Plan  · Secondary to by hydronephrosis  · Presented creatinine 1 5  · Initially had resolved now creatinine increased to 1 7 despite IV fluids  · Will check renal ultrasound to rule out hydronephrosis    Metastasis of unknown origin [facility])  Assessment & Plan  CT a/p 07/27: revealed 1 cm nodule in the right middle lobe not seen on the prior exam from 6/22/2020 concerning for metastasis and diffuse sclerosis throughout the osseous pelvis and in the L3, L2, T12, T11, T10, and T8 vertebral bodies also concerning for metastasis   Elevated alk phos  Prostate likely source   Would benefit from oncology consultation which is currently pending    Gross hematuria  Assessment & Plan  · Patient presents with gross hematuria x 1m  Had had prostate biopsy in past, last bx 10/2021, cytology negative  Follows with a urology in [address] [place] [address]    · CT a/p 07/27: asymmetrically enlarged prostate gland; left side is larger  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  Bilateral hydronephrosis likely from obstruction at the UVJ secondary to enlarged/invading prostate  No renal stones  · S/p B/l PCN by IR  · Hold aspirin/nsaid   · Urinary retention protocol  · S/p 3 units prbc     · Hemoglobin has since stabilized        VTE Pharmacologic Prophylaxis:   Pharmacologic: Pharmacologic VTE Prophylaxis contraindicated due to Acute anemia  Mechanical VTE Prophylaxis in Place: Yes    Patient Centered Rounds: I have performed bedside rounds with nursing staff today  Discussions with Specialists or Other Care Team Provider: cm, nursing    Education and Discussions with Family / Patient: pt    Time Spent for Care: 30 minutes  More than 50% of total time spent on counseling and coordination of care as described above  Current Length of Stay: 3 day(s)    Current Patient Status: Inpatient   Certification Statement: The patient will continue to require additional inpatient hospital stay due to See below    Discharge Plan:  Still requiring further evaluation of anemia and oncology evaluation    Code Status: Level 1 - Full Code      Subjective:   Denies chest pain, shortness breath, cough, fevers, chills    Objective:     Vitals:   Temp (24hrs), Av 6 °F (37 °C), Min:97 4 °F (36 3 °C), Max:99 8 °F (37 7 °C)    Temp:  [97 4 °F (36 3 °C)-99 8 °F (37 7 °C)] 99 8 °F (37 7 °C)  HR:  [69-79] 74  Resp:  [18-20] 18  BP: (115-157)/(57-79) 157/79  SpO2:  [95 %-97 %] 96 %  Body mass index is 30 68 kg/m²  Input and Output Summary (last 24 hours): Intake/Output Summary (Last 24 hours) at 2022 0850  Last data filed at 2022 0754  Gross per 24 hour   Intake 2833 75 ml   Output 1550 ml   Net 1283 75 ml       Physical Exam:     Physical Exam  Constitutional:       General: He is not in acute distress  Appearance: He is well-developed  He is not diaphoretic  HENT:      Head: Normocephalic and atraumatic  Nose: Nose normal       Mouth/Throat:      Pharynx: No oropharyngeal exudate  Eyes:      General: No scleral icterus  Conjunctiva/sclera: Conjunctivae normal    Cardiovascular:      Rate and Rhythm: Normal rate and regular rhythm  Heart sounds: Normal heart sounds  No murmur heard  No friction rub  No gallop  Pulmonary:      Effort: Pulmonary effort is normal  No respiratory distress  Breath sounds: Normal breath sounds  No wheezing or rales     Chest: Chest wall: No tenderness  Abdominal:      General: Bowel sounds are normal  There is no distension  Palpations: Abdomen is soft  Tenderness: There is no abdominal tenderness  There is no guarding  Musculoskeletal:         General: No tenderness or deformity  Normal range of motion  Cervical back: Normal range of motion and neck supple  Skin:     General: Skin is warm and dry  Findings: No erythema  Neurological:      Mental Status: He is alert  Mental status is at baseline  ( )    Additional Data:     Labs:    Results from last 7 days   Lab Units 07/31/22  0456   WBC Thousand/uL 11 46*   HEMOGLOBIN g/dL 7 8*   HEMATOCRIT % 23 8*   PLATELETS Thousands/uL 104*   NEUTROS PCT % 69   LYMPHS PCT % 17   MONOS PCT % 11   EOS PCT % 2     Results from last 7 days   Lab Units 07/31/22  0456 07/28/22  0619 07/27/22  2307   SODIUM mmol/L 136   < > 139   POTASSIUM mmol/L 5 1   < > 4 1   CHLORIDE mmol/L 106   < > 108   CO2 mmol/L 19*   < > 19*   BUN mg/dL 21   < > 31*   CREATININE mg/dL 1 67*   < > 1 50*   ANION GAP mmol/L 11   < > 12   CALCIUM mg/dL 8 3   < > 8 8   ALBUMIN g/dL  --   --  3 4*   TOTAL BILIRUBIN mg/dL  --   --  0 31   ALK PHOS U/L  --   --  1,044*   ALT U/L  --   --  11*   AST U/L  --   --  41   GLUCOSE RANDOM mg/dL 100   < > 103    < > = values in this interval not displayed  Results from last 7 days   Lab Units 07/27/22  2307   INR  1 18                       * I Have Reviewed All Lab Data Listed Above  * Additional Pertinent Lab Tests Reviewed:  All Labs Within Last 24 Hours Reviewed    Imaging:    Imaging Reports Reviewed Today Include: na  Imaging Personally Reviewed by Myself Includes:  na    Recent Cultures (last 7 days):     Results from last 7 days   Lab Units 07/28/22  1444 07/28/22  1424 07/28/22  0110   URINE CULTURE  No Growth <100 cfu/mL No Growth <100 cfu/mL 40,000-49,000 cfu/ml Aerococcus urinae*       Last 24 Hours Medication List:   Current Facility-Administered Medications   Medication Dose Route Frequency Provider Last Rate    acetaminophen  650 mg Oral Q6H PRN Bri Denney PA-C      allopurinol  300 mg Oral Daily Jovi Iverson      cefTRIAXone  1,000 mg Intravenous Q24H Ion Luevano MD 1,000 mg (07/31/22 0847)    finasteride  5 mg Oral Daily Bri Denney PA-C      metoprolol succinate  100 mg Oral Daily Jovi Iverson      pravastatin  40 mg Oral Daily With [place], [place]      sodium chloride  75 mL/hr Intravenous Continuous Bri Denney PA-C 75 mL/hr (07/31/22 0515)    tamsulosin  0 4 mg Oral Daily With TRICIA Loredo          Today, Patient Was Seen By: Russell Aguilar MD    ** Please Note: Dictation voice to text software may have been used in the creation of this document   **

## 2022-07-31 NOTE — ASSESSMENT & PLAN NOTE
· Secondary to by hydronephrosis  · Presented creatinine 1 5  · Initially had resolved now creatinine increased to 1 7 despite IV fluids  · Will check renal ultrasound to rule out hydronephrosis

## 2022-07-31 NOTE — ASSESSMENT & PLAN NOTE
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

## 2022-07-31 NOTE — PLAN OF CARE
Problem: Potential for Falls  Goal: Patient will remain free of falls  Description: INTERVENTIONS:  - Educate patient/family on patient safety including physical limitations  - Instruct patient to call for assistance with activity   - Consult OT/PT to assist with strengthening/mobility   - Keep Call bell within reach  - Keep bed low and locked with side rails adjusted as appropriate  - Keep care items and personal belongings within reach  - Initiate and maintain comfort rounds  - Make Fall Risk Sign visible to staff  - Offer Toileting every 2 Hours, in advance of need  - Initiate/Maintain alarm  - Obtain necessary fall risk management equipment  - Apply yellow socks and bracelet for high fall risk patients  - Consider moving patient to room near nurses station  Outcome: Progressing     Problem: SAFETY ADULT  Goal: Patient will remain free of falls  Description: INTERVENTIONS:  - Educate patient/family on patient safety including physical limitations  - Instruct patient to call for assistance with activity   - Consult OT/PT to assist with strengthening/mobility   - Keep Call bell within reach  - Keep bed low and locked with side rails adjusted as appropriate  - Keep care items and personal belongings within reach  - Initiate and maintain comfort rounds  - Make Fall Risk Sign visible to staff  - Offer Toileting every 2 Hours, in advance of need  - Initiate/Maintain alarm  - Obtain necessary fall risk management equipment  - Apply yellow socks and bracelet for high fall risk patients  - Consider moving patient to room near nurses station  Outcome: Progressing  Goal: Maintain or return to baseline ADL function  Description: INTERVENTIONS:  - Educate patient/family on patient safety including physical limitations  - Instruct patient to call for assistance with activity   - Consult OT/PT to assist with strengthening/mobility   - Keep Call bell within reach  - Keep bed low and locked with side rails adjusted as appropriate  - Keep care items and personal belongings within reach  - Initiate and maintain comfort rounds  - Make Fall Risk Sign visible to staff  - Offer Toileting every 2 Hours, in advance of need  - Initiate/Maintain alarm  - Obtain necessary fall risk management equipment  - Apply yellow socks and bracelet for high fall risk patients  - Consider moving patient to room near nurses station  Outcome: Progressing  Goal: Maintains/Returns to pre admission functional level  Description: INTERVENTIONS:  - Perform BMAT or MOVE assessment daily    - Set and communicate daily mobility goal to care team and patient/family/caregiver  - Collaborate with rehabilitation services on mobility goals if consulted  - Dangle patient 3 times a day  - Stand patient 3 times a day  - Ambulate patient 3 times a day  - Out of bed to chair 3 times a day   - Out of bed for meals 3 times a day  - Out of bed for toileting  - Record patient progress and toleration of activity level   Outcome: Progressing     Problem: Knowledge Deficit  Goal: Patient/family/caregiver demonstrates understanding of disease process, treatment plan, medications, and discharge instructions  Description: Complete learning assessment and assess knowledge base    Interventions:  - Provide teaching at level of understanding  - Provide teaching via preferred learning methods  Outcome: Progressing     Problem: MOBILITY - ADULT  Goal: Maintain or return to baseline ADL function  Description: INTERVENTIONS:  - Educate patient/family on patient safety including physical limitations  - Instruct patient to call for assistance with activity   - Consult OT/PT to assist with strengthening/mobility   - Keep Call bell within reach  - Keep bed low and locked with side rails adjusted as appropriate  - Keep care items and personal belongings within reach  - Initiate and maintain comfort rounds  - Make Fall Risk Sign visible to staff  - Offer Toileting every 2 Hours, in advance of need  - Initiate/Maintain alarm  - Obtain necessary fall risk management equipment  - Apply yellow socks and bracelet for high fall risk patients  - Consider moving patient to room near nurses station  Outcome: Progressing  Goal: Maintains/Returns to pre admission functional level  Description: INTERVENTIONS:  - Perform BMAT or MOVE assessment daily    - Set and communicate daily mobility goal to care team and patient/family/caregiver  - Collaborate with rehabilitation services on mobility goals if consulted  - Perform Range of Motion 3 times a day  - Reposition patient every 2 hours    - Dangle patient 3 times a day  - Stand patient 3 times a day  - Ambulate patient 3 times a day  - Out of bed to chair 3 times a day   - Out of bed for meals 3 times a day  - Out of bed for toileting  - Record patient progress and toleration of activity level   Outcome: Progressing

## 2022-07-31 NOTE — ASSESSMENT & PLAN NOTE
CT a/p 07/27: revealed 1 cm nodule in the right middle lobe not seen on the prior exam from 6/22/2020 concerning for metastasis and diffuse sclerosis throughout the osseous pelvis and in the L3, L2, T12, T11, T10, and T8 vertebral bodies also concerning for metastasis   Elevated alk phos  Prostate likely source   Would benefit from oncology consultation which is currently pending

## 2022-07-31 NOTE — ASSESSMENT & PLAN NOTE
Hgb 6 6 on admission  Last hgb 13 on labs from 10/2021   Has assoc fatigue   Likely 2/2 hematuria   Hematuria since resolved  Status post 3 units packed red blood cells  Has since stabilized at approximately 8

## 2022-08-01 ENCOUNTER — APPOINTMENT (OUTPATIENT)
Dept: UROLOGY | Facility: AMBULATORY SURGERY CENTER | Age: 72
End: 2022-08-01

## 2022-08-01 ENCOUNTER — APPOINTMENT (INPATIENT)
Dept: CT IMAGING | Facility: HOSPITAL | Age: 72
DRG: 715 | End: 2022-08-01
Payer: MEDICARE

## 2022-08-01 LAB
ANION GAP SERPL CALCULATED.3IONS-SCNC: 8 MMOL/L (ref 4–13)
BASOPHILS # BLD AUTO: 0.02 THOUSANDS/ÂΜL (ref 0–0.1)
BASOPHILS NFR BLD AUTO: 0 % (ref 0–1)
BUN SERPL-MCNC: 17 MG/DL (ref 5–25)
CALCIUM SERPL-MCNC: 8.2 MG/DL (ref 8.3–10.1)
CHLORIDE SERPL-SCNC: 105 MMOL/L (ref 96–108)
CO2 SERPL-SCNC: 23 MMOL/L (ref 21–32)
CREAT SERPL-MCNC: 1.13 MG/DL (ref 0.6–1.3)
EOSINOPHIL # BLD AUTO: 0.27 THOUSAND/ÂΜL (ref 0–0.61)
EOSINOPHIL NFR BLD AUTO: 3 % (ref 0–6)
ERYTHROCYTE [DISTWIDTH] IN BLOOD BY AUTOMATED COUNT: 15.8 % (ref 11.6–15.1)
GFR SERPL CREATININE-BSD FRML MDRD: 64 ML/MIN/1.73SQ M
GLUCOSE SERPL-MCNC: 107 MG/DL (ref 65–140)
HCT VFR BLD AUTO: 23.6 % (ref 36.5–49.3)
HGB BLD-MCNC: 7.7 G/DL (ref 12–17)
IMM GRANULOCYTES # BLD AUTO: 0.08 THOUSAND/UL (ref 0–0.2)
IMM GRANULOCYTES NFR BLD AUTO: 1 % (ref 0–2)
LYMPHOCYTES # BLD AUTO: 2.06 THOUSANDS/ÂΜL (ref 0.6–4.47)
LYMPHOCYTES NFR BLD AUTO: 22 % (ref 14–44)
MCH RBC QN AUTO: 30.2 PG (ref 26.8–34.3)
MCHC RBC AUTO-ENTMCNC: 32.6 G/DL (ref 31.4–37.4)
MCV RBC AUTO: 93 FL (ref 82–98)
MONOCYTES # BLD AUTO: 1.13 THOUSAND/ÂΜL (ref 0.17–1.22)
MONOCYTES NFR BLD AUTO: 12 % (ref 4–12)
NEUTROPHILS # BLD AUTO: 5.96 THOUSANDS/ÂΜL (ref 1.85–7.62)
NEUTS SEG NFR BLD AUTO: 62 % (ref 43–75)
NRBC BLD AUTO-RTO: 0 /100 WBCS
PLATELET # BLD AUTO: 134 THOUSANDS/UL (ref 149–390)
PMV BLD AUTO: 12.3 FL (ref 8.9–12.7)
POTASSIUM SERPL-SCNC: 4.5 MMOL/L (ref 3.5–5.3)
RBC # BLD AUTO: 2.55 MILLION/UL (ref 3.88–5.62)
SODIUM SERPL-SCNC: 136 MMOL/L (ref 135–147)
WBC # BLD AUTO: 9.52 THOUSAND/UL (ref 4.31–10.16)

## 2022-08-01 PROCEDURE — 80048 BASIC METABOLIC PNL TOTAL CA: CPT | Performed by: INTERNAL MEDICINE

## 2022-08-01 PROCEDURE — NC001 PR NO CHARGE

## 2022-08-01 PROCEDURE — 99222 1ST HOSP IP/OBS MODERATE 55: CPT | Performed by: INTERNAL MEDICINE

## 2022-08-01 PROCEDURE — 99232 SBSQ HOSP IP/OBS MODERATE 35: CPT | Performed by: INTERNAL MEDICINE

## 2022-08-01 PROCEDURE — 71260 CT THORAX DX C+: CPT

## 2022-08-01 PROCEDURE — 85025 COMPLETE CBC W/AUTO DIFF WBC: CPT | Performed by: INTERNAL MEDICINE

## 2022-08-01 RX ORDER — OXYCODONE HYDROCHLORIDE 5 MG/1
5 TABLET ORAL EVERY 6 HOURS PRN
Status: DISCONTINUED | OUTPATIENT
Start: 2022-08-01 | End: 2022-08-12 | Stop reason: HOSPADM

## 2022-08-01 RX ORDER — TRAMADOL HYDROCHLORIDE 50 MG/1
50 TABLET ORAL ONCE
Status: COMPLETED | OUTPATIENT
Start: 2022-08-01 | End: 2022-08-01

## 2022-08-01 RX ADMIN — SODIUM CHLORIDE 75 ML/HR: 9 INJECTION, SOLUTION INTRAVENOUS at 06:18

## 2022-08-01 RX ADMIN — IOHEXOL 65 ML: 350 INJECTION, SOLUTION INTRAVENOUS at 15:13

## 2022-08-01 RX ADMIN — PRAVASTATIN SODIUM 40 MG: 40 TABLET ORAL at 17:29

## 2022-08-01 RX ADMIN — CEFTRIAXONE 1000 MG: 1 INJECTION, SOLUTION INTRAVENOUS at 09:14

## 2022-08-01 RX ADMIN — METOPROLOL SUCCINATE 100 MG: 100 TABLET, EXTENDED RELEASE ORAL at 09:14

## 2022-08-01 RX ADMIN — TRAMADOL HYDROCHLORIDE 50 MG: 50 TABLET, COATED ORAL at 18:28

## 2022-08-01 RX ADMIN — TAMSULOSIN HYDROCHLORIDE 0.4 MG: 0.4 CAPSULE ORAL at 17:29

## 2022-08-01 RX ADMIN — ALLOPURINOL 300 MG: 300 TABLET ORAL at 09:13

## 2022-08-01 RX ADMIN — FINASTERIDE 5 MG: 5 TABLET, FILM COATED ORAL at 09:14

## 2022-08-01 NOTE — PROGRESS NOTES
[facility]  Progress Note - [place] 1950, 67 y o  male MRN: [id number]  Unit/Bed#: -01 Encounter: [id number]  Primary Care Provider: TRICIA Rodriguez   Date and time admitted to hospital: 7/27/2022 11:40 PM    * Acute blood loss anemia  Assessment & Plan  Hgb 6 6 on admission  Last hgb 13 on labs from 10/2021  Has assoc fatigue   Likely 2/2 hematuria   Hematuria since resolved  Status post 3 units packed red blood cells  Has since stabilized at approximately 8    MICA (acute kidney injury) ([facility] [address] )  Assessment & Plan  · Secondary to by hydronephrosis  · Presented creatinine 1 5  · Initially had resolved now creatinine increased to 1 7 despite IV fluids  · Renal u/s ruled out hydro  · Repeat bmp pending    Right shoulder pain  Assessment & Plan  Limited mobility due to pain  Worse with shoulder extension  XR R shoulder   Tylenol prn it    Metastasis of unknown origin [facility])  Assessment & Plan  CT a/p 07/27: revealed 1 cm nodule in the right middle lobe not seen on the prior exam from 6/22/2020 concerning for metastasis and diffuse sclerosis throughout the osseous pelvis and in the L3, L2, T12, T11, T10, and T8 vertebral bodies also concerning for metastasis   Elevated alk phos  Prostate likely source   Would benefit from oncology consultation which is currently pending    Gross hematuria  Assessment & Plan  · Patient presents with gross hematuria x 1m  Had had prostate biopsy in past, last bx 10/2021, cytology negative  Follows with a urology in [address] [place] [address]    · CT a/p 07/27: asymmetrically enlarged prostate gland; left side is larger  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  Bilateral hydronephrosis likely from obstruction at the UVJ secondary to enlarged/invading prostate  No renal stones  · S/p B/l PCN by IR  · Hold aspirin/nsaid   · Urinary retention protocol  · S/p 3 units prbc     · Hemoglobin has since stabilized        VTE Pharmacologic Prophylaxis:   Pharmacologic: Pharmacologic VTE Prophylaxis contraindicated due to hemutria  Mechanical VTE Prophylaxis in Place: Yes    Patient Centered Rounds: I have performed bedside rounds with nursing staff today  Discussions with Specialists or Other Care Team Provider: cm, nursing    Education and Discussions with Family / Patient: pt    Time Spent for Care: 30 minutes  More than 50% of total time spent on counseling and coordination of care as described above  Current Length of Stay: 4 day(s)    Current Patient Status: Inpatient   Certification Statement: The patient will continue to require additional inpatient hospital stay due to see below    Discharge Plan: pending further eval of omar and oncology eval    Code Status: Level 1 - Full Code      Subjective:   Denies chest pain, sob, cough, fevers    Objective:     Vitals:   Temp (24hrs), Av 5 °F (36 9 °C), Min:98 4 °F (36 9 °C), Max:98 6 °F (37 °C)    Temp:  [98 4 °F (36 9 °C)-98 6 °F (37 °C)] 98 4 °F (36 9 °C)  HR:  [69-75] 71  Resp:  [19-20] 19  BP: (133-147)/(70-73) 147/73  SpO2:  [99 %] 99 %  Body mass index is 30 75 kg/m²  Input and Output Summary (last 24 hours): Intake/Output Summary (Last 24 hours) at 2022 0900  Last data filed at 2022 0600  Gross per 24 hour   Intake 700 ml   Output 3001 ml   Net -2301 ml       Physical Exam:     Physical Exam  Constitutional:       General: He is not in acute distress  Appearance: He is well-developed  He is not diaphoretic  HENT:      Head: Normocephalic and atraumatic  Nose: Nose normal       Mouth/Throat:      Pharynx: No oropharyngeal exudate  Eyes:      General: No scleral icterus  Conjunctiva/sclera: Conjunctivae normal    Cardiovascular:      Rate and Rhythm: Normal rate and regular rhythm  Heart sounds: Normal heart sounds  No murmur heard  No friction rub  No gallop  Pulmonary:      Effort: Pulmonary effort is normal  No respiratory distress  Breath sounds: Normal breath sounds  No wheezing or rales  Chest:      Chest wall: No tenderness  Abdominal:      General: Bowel sounds are normal  There is no distension  Palpations: Abdomen is soft  Tenderness: There is no abdominal tenderness  There is no guarding  Musculoskeletal:         General: No tenderness or deformity  Normal range of motion  Cervical back: Normal range of motion and neck supple  Skin:     General: Skin is warm and dry  Findings: No erythema  Neurological:      Mental Status: He is alert  Mental status is at baseline  Additional Data:     Labs:    Results from last 7 days   Lab Units 07/31/22  0456   WBC Thousand/uL 11 46*   HEMOGLOBIN g/dL 7 8*   HEMATOCRIT % 23 8*   PLATELETS Thousands/uL 104*   NEUTROS PCT % 69   LYMPHS PCT % 17   MONOS PCT % 11   EOS PCT % 2     Results from last 7 days   Lab Units 07/31/22  0456 07/28/22  0619 07/27/22  2307   SODIUM mmol/L 136   < > 139   POTASSIUM mmol/L 5 1   < > 4 1   CHLORIDE mmol/L 106   < > 108   CO2 mmol/L 19*   < > 19*   BUN mg/dL 21   < > 31*   CREATININE mg/dL 1 67*   < > 1 50*   ANION GAP mmol/L 11   < > 12   CALCIUM mg/dL 8 3   < > 8 8   ALBUMIN g/dL  --   --  3 4*   TOTAL BILIRUBIN mg/dL  --   --  0 31   ALK PHOS U/L  --   --  1,044*   ALT U/L  --   --  11*   AST U/L  --   --  41   GLUCOSE RANDOM mg/dL 100   < > 103    < > = values in this interval not displayed  Results from last 7 days   Lab Units 07/27/22  2307   INR  1 18                       * I Have Reviewed All Lab Data Listed Above  * Additional Pertinent Lab Tests Reviewed:  All Labs Within Last 24 Hours Reviewed    Imaging:    Imaging Reports Reviewed Today Include: na  Imaging Personally Reviewed by Myself Includes:  na    Recent Cultures (last 7 days):     Results from last 7 days   Lab Units 07/28/22  1444 07/28/22  1424 07/28/22  0110   URINE CULTURE  No Growth <100 cfu/mL No Growth <100 cfu/mL 40,000-49,000 cfu/ml Aerococcus urinae*       Last 24 Hours Medication List:   Current Facility-Administered Medications   Medication Dose Route Frequency Provider Last Rate    acetaminophen  650 mg Oral Q6H PRN Fady Hensley PA-C      allopurinol  300 mg Oral Daily [place], [place]      cefTRIAXone  1,000 mg Intravenous Q24H Ion Luevano MD 1,000 mg (07/31/22 0847)    finasteride  5 mg Oral Daily Fadypaulette Hensley PA-C      metoprolol succinate  100 mg Oral Daily [place], [place]      pravastatin  40 mg Oral Daily With [place], [place]      sodium chloride  75 mL/hr Intravenous Continuous Fadypaulette Hensley PA-C 75 mL/hr (08/01/22 8235)    tamsulosin  0 4 mg Oral Daily With TRICIA Loredo          Today, Patient Was Seen By: Cari Barker MD    ** Please Note: Dictation voice to text software may have been used in the creation of this document   **

## 2022-08-01 NOTE — CONSULTS
Medical Oncology/Hematology Consult Note  Juan Jang, male, 67 y o , 1950,  /-01, [id number]     Reason for admission: Acute blood loss anemia, MICA, bone metastasis  Reason for consultation: bone metastasis    ASSESSMENT AND PLAN:     1  Bone metastasis   7/28: CT abd/pelvis showed below finding:  o Asymmetrically enlarged prostate gland; left side is larger     Bladder wall thickening likely hypertrophy however may represent cystitis in the appropriate clinical setting  Posterior aspect is invaded by the prostate gland  Bilateral hydronephrosis likely from obstruction at the UVJ secondary to enlarged/invading prostate  No renal stones  o 1 cm nodule in the right middle lobe not seen on the prior exam from 6/22/2020 concerning for metastasis  Trace bilateral effusions  o Diffuse sclerosis throughout the osseous pelvis and in the L3, L2, T12, T11, T10, and T8 vertebral bodies is concerning for metastasis  o There appears to be decreased signal adenopathy measuring 1 6 cm in the short axis (2:61)  Shotty periaortic adenopathy is noted     · 7/28: PSA was 135 7  7/27: CMP showed BUN 31, Cr 1 50, Alk phos 1,044, gianna calcium 9 3   Patient seen by urology during this hospitalization  S/P bilateral percutaneous nephrostomies   Plan/Recommendations:  o I discussed CT scan results in detail with patient  Likely has metastatic prostate cancer to the bones  o Will order CT of chest for complete staging  o Will consult IR for bone biopsy to confirm mets from prostate   o Per urology, will await to start casodex for now  Recommend initiating casodex as this is likely metastatic prostate cancer  However, can await bone biopsy results as well    o Will message hopeline for follow up at our practice in about 2-3 weeks to discuss further treatment  ECOG 1/2  Likely will be ADT + secondary antihormonal manipulation such as Sarah Ruff, etc  Details to be discussed at follow up  2  Acute blood loss anemia   3  Thrombocytopenia   · : WBC 9 52, Hgb 7 7, MCV 93, PLT 134K, diff normal   · Patient presented with gross hematuria     · Plan/Recommendations:  · Etiology of anemia is secondary to acute blood loss from hematuria  · Continue management per urology  · Can consider Venofer 200mg EOD if continues to require pRBC consistently  · Transfuse if Hgb <7g/dL or symptomatic anemia   · Will order the following labs for completeness:   · SPEP, immunoglobulin free lt chain, Vitamin B12, folate, iron panel, hemolysis smear, LEIA, haptoglobin    Patient understands and is in agreement with this plan  Thank you for the opportunity to participate in this patient's care  Interval History: Patient seen at bedside with his daughter who provided translation  He is laying comfortably in bed in no acute distress  States he is mildly constipated and bilateral legs still have some pain  Otherwise, no specific complaints  No leg swelling  He did have about 16-20 lb weight loss in last few months due to poor appetite  No other constitutional symptoms  Does not smoke  Quit 40 years ago  Does not drink  Has never had malignancy before  No Fhx of malignancy  ECO/2    History of present illness: This is a 67year old [address] speaking male with a PMH of HTN, BPH who presented on  with weakness in upper and lower extremities  He also was unable to walk for 2 days  He had these symptoms ongoing for 1 month  He was following a urologist in [place] for hx of BPH  Daughter brought patient to [place] for second opinion  He was supposed to undergo a urological procedure  S/P prostate biopsy approx 9 months ago which was negative  In ED, he was found to have Hgb 6 6g/dL  Had gross hematuria as well as presentation   CT abd,pelvis:  IMPRESSION:     - Asymmetrically enlarged prostate gland; left side is larger     Bladder wall thickening likely hypertrophy however may represent cystitis in the appropriate clinical setting  Posterior aspect is invaded by the prostate gland  Bilateral hydronephrosis   likely from obstruction at the UVJ secondary to enlarged/invading prostate  No renal stones      - 1 cm nodule in the right middle lobe not seen on the prior exam from 6/22/2020 concerning for metastasis  Trace bilateral effusions      - Diffuse sclerosis throughout the osseous pelvis and in the L3, L2, T12, T11, T10, and T8 vertebral bodies is concerning for metastasis      - There appears to be decreased signal adenopathy measuring 1 6 cm in the short axis (2:61)  Shotty periaortic adenopathy is noted       7/28: PSA was 135 7  7/27: CMP showed BUN 31, Cr 1 50, Alk phos 1,044, gianna calcium 9 3  Labs today 8/1/22: Hgb 7 7, MCV 93, PLT 134K, diff normal      Urology saw patient, S/P IR placement of bilateral percutaneous nephrostomies  Review of Systems:   Review of Systems   Constitutional: Positive for activity change, appetite change, fatigue and unexpected weight change (16-20lbs loss in last few months )  Negative for chills, diaphoresis and fever  HENT: Negative for nosebleeds  Respiratory: Negative for apnea, cough, chest tightness and shortness of breath  Cardiovascular: Negative for chest pain, palpitations and leg swelling  Gastrointestinal: Positive for constipation  Negative for abdominal distention, abdominal pain, anal bleeding, blood in stool, diarrhea, nausea and vomiting  Endocrine: Negative for cold intolerance  Genitourinary: Negative for hematuria  Musculoskeletal: Positive for gait problem  Negative for arthralgias and back pain  Skin: Negative for color change, pallor, rash and wound  Neurological: Negative for dizziness, weakness, light-headedness and headaches  Hematological: Negative for adenopathy  Does not bruise/bleed easily           PHYSICAL EXAM:    /73   Pulse 71   Temp 98 4 °F (36 9 °C)   Resp 19   Ht 5' 10" (1 778 m)   Wt 97 2 kg (214 lb 4 6 oz) SpO2 99%   BMI 30 75 kg/m²     Physical Exam  Constitutional:       General: He is not in acute distress  Appearance: Normal appearance  He is normal weight  He is not ill-appearing, toxic-appearing or diaphoretic  HENT:      Head: Normocephalic and atraumatic  Eyes:      General: No scleral icterus  Extraocular Movements: Extraocular movements intact  Conjunctiva/sclera: Conjunctivae normal    Cardiovascular:      Rate and Rhythm: Normal rate and regular rhythm  Heart sounds: Normal heart sounds  No murmur heard  Pulmonary:      Effort: Pulmonary effort is normal  No respiratory distress  Breath sounds: Normal breath sounds  No stridor  No wheezing, rhonchi or rales  Abdominal:      General: Bowel sounds are normal       Tenderness: There is no abdominal tenderness  Comments: Bilateral nephrostomy tubes present with hematuria seen  Musculoskeletal:      Cervical back: Normal range of motion and neck supple  Right lower leg: No edema  Left lower leg: No edema  Lymphadenopathy:      Cervical: No cervical adenopathy  Skin:     General: Skin is warm and dry  Coloration: Skin is not jaundiced or pale  Findings: No bruising, erythema, lesion or rash  Neurological:      General: No focal deficit present  Mental Status: He is alert and oriented to person, place, and time  Mental status is at baseline  Motor: No weakness  Psychiatric:         Mood and Affect: Mood normal          Behavior: Behavior normal          Thought Content:  Thought content normal          Judgment: Judgment normal          LABS:     Recent Results (from the past 48 hour(s))   CBC and differential    Collection Time: 07/31/22  4:56 AM   Result Value Ref Range    WBC 11 46 (H) 4 31 - 10 16 Thousand/uL    RBC 2 54 (L) 3 88 - 5 62 Million/uL    Hemoglobin 7 8 (L) 12 0 - 17 0 g/dL    Hematocrit 23 8 (L) 36 5 - 49 3 %    MCV 94 82 - 98 fL    MCH 30 7 26 8 - 34 3 pg    MCHC 32 8 31 4 - 37 4 g/dL    RDW 15 7 (H) 11 6 - 15 1 %    MPV 12 2 8 9 - 12 7 fL    Platelets 182 (L) 739 - 390 Thousands/uL    nRBC 0 /100 WBCs    Neutrophils Relative 69 43 - 75 %    Immat GRANS % 1 0 - 2 %    Lymphocytes Relative 17 14 - 44 %    Monocytes Relative 11 4 - 12 %    Eosinophils Relative 2 0 - 6 %    Basophils Relative 0 0 - 1 %    Neutrophils Absolute 8 00 (H) 1 85 - 7 62 Thousands/µL    Immature Grans Absolute 0 06 0 00 - 0 20 Thousand/uL    Lymphocytes Absolute 1 97 0 60 - 4 47 Thousands/µL    Monocytes Absolute 1 20 0 17 - 1 22 Thousand/µL    Eosinophils Absolute 0 21 0 00 - 0 61 Thousand/µL    Basophils Absolute 0 02 0 00 - 0 10 Thousands/µL   Basic metabolic panel    Collection Time: 07/31/22  4:56 AM   Result Value Ref Range    Sodium 136 135 - 147 mmol/L    Potassium 5 1 3 5 - 5 3 mmol/L    Chloride 106 96 - 108 mmol/L    CO2 19 (L) 21 - 32 mmol/L    ANION GAP 11 4 - 13 mmol/L    BUN 21 5 - 25 mg/dL    Creatinine 1 67 (H) 0 60 - 1 30 mg/dL    Glucose 100 65 - 140 mg/dL    Calcium 8 3 8 3 - 10 1 mg/dL    eGFR 39 ml/min/1 73sq m   Basic metabolic panel    Collection Time: 08/01/22 11:04 AM   Result Value Ref Range    Sodium 136 135 - 147 mmol/L    Potassium 4 5 3 5 - 5 3 mmol/L    Chloride 105 96 - 108 mmol/L    CO2 23 21 - 32 mmol/L    ANION GAP 8 4 - 13 mmol/L    BUN 17 5 - 25 mg/dL    Creatinine 1 13 0 60 - 1 30 mg/dL    Glucose 107 65 - 140 mg/dL    Calcium 8 2 (L) 8 3 - 10 1 mg/dL    eGFR 64 ml/min/1 73sq m   CBC and differential    Collection Time: 08/01/22 11:04 AM   Result Value Ref Range    WBC 9 52 4 31 - 10 16 Thousand/uL    RBC 2 55 (L) 3 88 - 5 62 Million/uL    Hemoglobin 7 7 (L) 12 0 - 17 0 g/dL    Hematocrit 23 6 (L) 36 5 - 49 3 %    MCV 93 82 - 98 fL    MCH 30 2 26 8 - 34 3 pg    MCHC 32 6 31 4 - 37 4 g/dL    RDW 15 8 (H) 11 6 - 15 1 %    MPV 12 3 8 9 - 12 7 fL    Platelets 297 (L) 969 - 390 Thousands/uL    nRBC 0 /100 WBCs    Neutrophils Relative 62 43 - 75 %    Immat GRANS % 1 0 - 2 % Lymphocytes Relative 22 14 - 44 %    Monocytes Relative 12 4 - 12 %    Eosinophils Relative 3 0 - 6 %    Basophils Relative 0 0 - 1 %    Neutrophils Absolute 5 96 1 85 - 7 62 Thousands/µL    Immature Grans Absolute 0 08 0 00 - 0 20 Thousand/uL    Lymphocytes Absolute 2 06 0 60 - 4 47 Thousands/µL    Monocytes Absolute 1 13 0 17 - 1 22 Thousand/µL    Eosinophils Absolute 0 27 0 00 - 0 61 Thousand/µL    Basophils Absolute 0 02 0 00 - 0 10 Thousands/µL       CT abdomen pelvis wo contrast    Result Date: 7/28/2022  Narrative: CT ABDOMEN AND PELVIS WITHOUT IV CONTRAST INDICATION:   looking for metastatic prostate cancer  COMPARISON:  None  TECHNIQUE:  CT examination of the abdomen and pelvis was performed without intravenous contrast  Axial, sagittal, and coronal 2D reformatted images were created from the source data and submitted for interpretation  Radiation dose length product (DLP) for this visit:  687 mGy-cm   This examination, like all CT scans performed in the [facility], was performed utilizing techniques to minimize radiation dose exposure, including the use of iterative reconstruction and automated exposure control  Enteric contrast was not administered  FINDINGS: Lack of IV and oral contrast limits evaluation  ABDOMEN LOWER CHEST: 1 cm nodule in the right middle lobe not seen on the prior exam from 6/22/2020 concerning for metastasis  Trace bilateral effusions  LIVER/BILIARY TREE:  Unremarkable  GALLBLADDER:  No calcified gallstones  No pericholecystic inflammatory change  SPLEEN:  Unremarkable  PANCREAS:  Unremarkable  ADRENAL GLANDS:  Unremarkable  KIDNEYS/URETERS:  Unremarkable  Bilateral hydronephrosis  STOMACH AND BOWEL:  There is colonic diverticulosis without evidence of acute diverticulitis  APPENDIX:  A normal appendix was visualized  ABDOMINOPELVIC CAVITY:  No ascites  No pneumoperitoneum   There appears to be decreased signal adenopathy measuring 1 6 cm in the short axis (2: 61)  Shotty periaortic adenopathy is noted    VESSELS:  Unremarkable for patient's age  PELVIS REPRODUCTIVE ORGANS:  Asymmetrically enlarged prostate gland; left side is larger  Thomas Prabhakar URINARY BLADDER:  Bladder wall thickening likely hypertrophy however may represent cystitis in the appropriate clinical setting  Posterior aspect is invaded by the prostate gland    ABDOMINAL WALL/INGUINAL REGIONS:  Unremarkable  OSSEOUS STRUCTURES: Diffuse sclerosis throughout the osseous pelvis and in the L3, L2, T12, T11, T10, and T8 vertebral bodies is concerning for metastasis  No acute fracture  Impression: Asymmetrically enlarged prostate gland; left side is larger     Bladder wall thickening likely hypertrophy however may represent cystitis in the appropriate clinical setting  Posterior aspect is invaded by the prostate gland  Bilateral hydronephrosis likely from obstruction at the UVJ secondary to enlarged/invading prostate  No renal stones  1 cm nodule in the right middle lobe not seen on the prior exam from 6/22/2020 concerning for metastasis  Trace bilateral effusions  Diffuse sclerosis throughout the osseous pelvis and in the L3, L2, T12, T11, T10, and T8 vertebral bodies is concerning for metastasis  There appears to be decreased signal adenopathy measuring 1 6 cm in the short axis (2:61)  Shotty periaortic adenopathy is noted    Workstation performed: [id number]     XR chest 1 view portable    Result Date: 7/28/2022  Narrative: CHEST INDICATION:  Weakness  COMPARISON:  6/19/2020, CT chest 6/22/2020 EXAM PERFORMED/VIEWS:  XR CHEST PORTABLE FINDINGS: Cardiomediastinal silhouette appears unremarkable  The lungs are grossly clear, noting limited evaluation of the left retrocardiac region  No pneumothorax  Question mottled density of the right scapula  Impression: Limited chest with no acute cardiopulmonary disease  Question mottled density of the right scapula   Workstation performed: [id number]     XR shoulder 2+ vw right    Result Date: 7/28/2022  Narrative: RIGHT SHOULDER INDICATION:   right shoulder pain  COMPARISON:  None VIEWS:  XR SHOULDER 2+ VW RIGHT Images: 3 FINDINGS: Mottled sclerotic changes throughout the scapula with irregularity of the glenoid articular surface  Mild degenerative changes at the glenohumeral joint and acromioclavicular joint with some mild undersurface bony spurring  Mild sclerotic changes in several ribs Mild soft tissue prominence right upper arm  Visualized right lung is clear  Impression: Mottled sclerotic changes in the scapula, concerning for bony metastatic disease Workstation performed: [id number]     IR nephrostomy tube placement    Result Date: 7/29/2022  Narrative: Antegrade pyelogram and bilateral nephrostomy tube placement Clinical History:  Hematuria, anemia, enlarged prostate with bladder outlet obstruction and bilateral hydronephrosis Contrast:  20 mL of iohexol (OMNIPAQUE) Fluoro time: 4MIN 12SEC Radiation dose:  112 mGy Number of Images:  4 Conscious sedation time:  General Anesthesia The patient was placed prone on the table and the bilateral flanks were prepped and draped in the usual sterile fashion  After local anesthesia was administered to the skin, an 18 gauge needle was advanced under direct ultrasound guidance into a posterior lower pole calyx of the left kidney  A 0 018 wire was advanced through the needle, and the needle was removed  The Accustick coaxial dilator was inserted over the wire, and the internal portions including the wire were removed  Contrast was injected through the outer dilator, an antegrade pyelogram was performed  Intervention: The outer dilator was removed over a heavy-duty wire  After tract dilatation, a 10 [place] nephrostomy tube was advanced over the wire until the loop was formed within the renal pelvis  The catheter was then locked in place  The catheter was  sutured at the skin   Contrast was injected, confirming satisfactory location of the tube  The tube was then connected to gravity bag, and dressed sterilely  This same process was performed of the right kidney with placement of a right 10 [place] citizen nephrostomy tube  Impression: Impression: Successful placement of bilateral 10 [place] citizen nephrostomy tubes into bilateral moderately hydronephrotic renal collecting systems  Plan: Tubes to bag drainage  Flush twice a day in the hospital and q d  at home with 10 cc normal saline solution and return in 2 months for routine catheter changes  These tubes will be left in place until the patient's enlarged prostate is treated  Workstation performed: [id number]     US kidney and bladder    Result Date: 7/31/2022  Narrative: RENAL ULTRASOUND INDICATION:   omar  Abnormal CT scan which demonstrated bilateral hydronephrosis  Status post bilateral nephrostomy tubes placed 3 days ago  COMPARISON: 7/28/2022 TECHNIQUE:   Ultrasound of the retroperitoneum was performed with a curvilinear transducer utilizing volumetric sweeps and still imaging techniques  FINDINGS: KIDNEYS: Symmetric and normal size  Right kidney:  10 8 x 5 6 x 4 7 cm  Volume 146 9 mL Left kidney:  10 5 x 3 3 x 3 4 cm  Volume 61 8 mL Right kidney Normal echogenicity and contour  No mass is identified  No hydronephrosis  No shadowing calculi  No perinephric fluid collections  Left kidney Normal echogenicity and contour  No mass is identified  No hydronephrosis  No shadowing calculi  No perinephric fluid collections  URETERS: Nonvisualized  BLADDER: Only mild to moderate bladder distention  No focal thickening or mass lesions  Bilateral ureteral jets detected  Impression: No hydronephrosis   Workstation performed: [id number]         HISTORY:    Past Medical History:   Diagnosis Date    Gout     Hypertension        Past Surgical History:   Procedure Laterality Date    IR NEPHROSTOMY TUBE PLACEMENT  7/28/2022       Family History   Problem Relation Age of Onset    Diabetes Mother    [facility] Hypertension Mother     Mental illness Mother     Heart attack Son     Heart attack Daughter        Social History     Socioeconomic History    Marital status: Single     Spouse name: None    Number of children: None    Years of education: None    Highest education level: None   Occupational History    None   Tobacco Use    Smoking status: Former Smoker     Types: Cigarettes    Smokeless tobacco: Never Used    Tobacco comment: quit 24yrs ago   Vaping Use    Vaping Use: Never used   Substance and Sexual Activity    Alcohol use:  Yes    Drug use: Never    Sexual activity: None   Other Topics Concern    None   Social History Narrative    None     Social Determinants of Health     Financial Resource Strain: Not on file   Food Insecurity: Not on file   Transportation Needs: Not on file   Physical Activity: Not on file   Stress: Not on file   Social Connections: Not on file   Intimate Partner Violence: Not on file   Housing Stability: Not on file         Current Facility-Administered Medications:     acetaminophen (TYLENOL) tablet 650 mg, 650 mg, Oral, Q6H PRN, [facility], PA-ISELA, 650 mg at 07/29/22 1151    allopurinol (ZYLOPRIM) tablet 300 mg, 300 mg, Oral, Daily, [facility], PAYOLETTE, 300 mg at 08/01/22 0913    cefTRIAXone (ROCEPHIN) IVPB (premix in dextrose) 1,000 mg 50 mL, 1,000 mg, Intravenous, Q24H, Ion Luevano MD, Last Rate: 100 mL/hr at 08/01/22 0914, 1,000 mg at 08/01/22 0914    finasteride (PROSCAR) tablet 5 mg, 5 mg, Oral, Daily, [facility], PA-C, 5 mg at 08/01/22 0914    metoprolol succinate (TOPROL-XL) 24 hr tablet 100 mg, 100 mg, Oral, Daily, [facility], PA-C, 100 mg at 08/01/22 0914    pravastatin (PRAVACHOL) tablet 40 mg, 40 mg, Oral, Daily With Dinner, [facility], PA-C, 40 mg at 07/31/22 1729    sodium chloride 0 9 % infusion, 75 mL/hr, Intravenous, Continuous, [facility], Last Rate: 75 mL/hr at 08/01/22 0618, 75 mL/hr at 08/01/22 0618   tamsulosin (FLOMAX) capsule 0 4 mg, 0 4 mg, Oral, Daily With Dinner, Lai Ani, KEVINNP, 0 4 mg at 07/31/22 7199    Medications Prior to Admission   Medication    allopurinol (ZYLOPRIM) 100 mg tablet    allopurinol (ZYLOPRIM) 300 mg tablet    aspirin 81 mg chewable tablet    fluticasone (FLONASE) 50 mcg/act nasal spray    metoprolol succinate (TOPROL-XL) 50 mg 24 hr tablet    tamsulosin (FLOMAX) 0 4 mg    brompheniramine-pseudoephedrine-DM 30-2-10 MG/5ML syrup    lisinopril (ZESTRIL) 10 mg tablet    methylPREDNISolone 4 MG tablet therapy pack    multivitamin (THERAGRAN) TABS    oxybutynin (DITROPAN) 5 mg tablet    pantoprazole (PROTONIX) 40 mg tablet    promethazine-codeine (PHENERGAN WITH CODEINE) 6 25-10 mg/5 mL syrup    simvastatin (ZOCOR) 10 mg tablet       No Known Allergies    Labs and pertinent reports reviewed  This note has been generated by voice recognition software system  Therefore, there may be spelling, grammar, and or syntax errors  Please contact if questions arise

## 2022-08-01 NOTE — PLAN OF CARE
Problem: Potential for Falls  Goal: Patient will remain free of falls  Description: INTERVENTIONS:  - Educate patient/family on patient safety including physical limitations  - Instruct patient to call for assistance with activity   - Consult OT/PT to assist with strengthening/mobility   - Keep Call bell within reach  - Keep bed low and locked with side rails adjusted as appropriate  - Keep care items and personal belongings within reach  - Initiate and maintain comfort rounds  - Make Fall Risk Sign visible to staff  - Offer Toileting every 2 Hours, in advance of need  - Initiate/Maintain alarm  - Obtain necessary fall risk management equipment  - Apply yellow socks and bracelet for high fall risk patients  - Consider moving patient to room near nurses station  Outcome: Progressing     Problem: PAIN - ADULT  Goal: Verbalizes/displays adequate comfort level or baseline comfort level  Description: Interventions:  - Encourage patient to monitor pain and request assistance  - Assess pain using appropriate pain scale  - Administer analgesics based on type and severity of pain and evaluate response  - Implement non-pharmacological measures as appropriate and evaluate response  - Consider cultural and social influences on pain and pain management  - Notify physician/advanced practitioner if interventions unsuccessful or patient reports new pain  Outcome: Progressing     Problem: INFECTION - ADULT  Goal: Absence or prevention of progression during hospitalization  Description: INTERVENTIONS:  - Assess and monitor for signs and symptoms of infection  - Monitor lab/diagnostic results  - Monitor all insertion sites, i e  indwelling lines, tubes, and drains  - Monitor endotracheal if appropriate and nasal secretions for changes in amount and color  - [place] appropriate cooling/warming therapies per order  - Administer medications as ordered  - Instruct and encourage patient and family to use good hand hygiene technique  - Identify and instruct in appropriate isolation precautions for identified infection/condition  Outcome: Progressing  Goal: Absence of fever/infection during neutropenic period  Description: INTERVENTIONS:  - Monitor WBC    Outcome: Progressing     Problem: SAFETY ADULT  Goal: Patient will remain free of falls  Description: INTERVENTIONS:  - Educate patient/family on patient safety including physical limitations  - Instruct patient to call for assistance with activity   - Consult OT/PT to assist with strengthening/mobility   - Keep Call bell within reach  - Keep bed low and locked with side rails adjusted as appropriate  - Keep care items and personal belongings within reach  - Initiate and maintain comfort rounds  - Make Fall Risk Sign visible to staff  - Offer Toileting every 2 Hours, in advance of need  - Initiate/Maintain alarm  - Obtain necessary fall risk management equipment  - Apply yellow socks and bracelet for high fall risk patients  - Consider moving patient to room near nurses station  Outcome: Progressing

## 2022-08-01 NOTE — ASSESSMENT & PLAN NOTE
· Secondary to by hydronephrosis  · Presented creatinine 1 5  · Initially had resolved now creatinine increased to 1 7 despite IV fluids  · Renal u/s ruled out hydro  · Repeat bmp pending

## 2022-08-01 NOTE — TELEMEDICINE
e-Consult (IPC)  - Interventional Radiology  Nadia Leon 67 y o  male MRN: [id number]  Unit/Bed#: -01 Encounter: [id number]          Interventional Radiology has been consulted to evaluate Nadia Leon    We were consulted by Oncology concerning this patient with possible metastasis  IP Consult to IR  Consult performed by: [facility], [facility]IC[facility]  Consult ordered by: Tiffany Alexandra PA-C        08/01/22    Assessment/Recommendation:   Nadia Leon, [place] male, who presented with gross hematuria x one month and enlarged prostate  Patient is s/p bilateral nephrostomy tube placement on 7/28/22  Patient had a CT scan that showed - Diffuse sclerosis throughout the osseous pelvis and in the L3, L2, T12, T11, T10, and T8 vertebral bodies is concerning for metastasis  PSA - 135 7  Oncology requesting bone biopsy to confirm mets from prostate  Plan -   Bone biopsy tomorrow (iliac, spine or scapula pending provider) - timing to be determined by team availability  NPO after midnight    Total time spent in review of data, discussion with requesting provider and rendering advice was 30 minutes     Thank you for allowing Interventional Radiology to participate in the care of Nadia Leon  Please don't hesitate to call or TigerText us with any questions       [facility] [place]

## 2022-08-01 NOTE — UTILIZATION REVIEW
Continued Stay Review    Date: 8/1/22                           Current Patient Class: IP   Current Level of Care: MS    HPI:72 y o  male initially admitted on 7/27/22  Acute blood loss anemia  Hgb 6 6 on admission, likely 2/2 hematuria  Assessment/Plan: Acute Anemia: 2/2 hematuria, hematuria resolved  S/p 3 units PRBC, HGB stabilized 7 7 today, MICA 2/2 to hydronephrosis, initially cr 1 5 has increased to 1 7 despite IVF hydration, renal US ruled out hydro, trend BMP,  Metastasis of unknown origin,  CT a/p 07/27: revealed 1 cm nodule in the right middle lobe not seen on the prior exam from 6/22/2020 concerning for metastasis and diffuse sclerosis throughout the osseous pelvis and in the L3, L2, T12, T11, T10, and T8 vertebral bodies also concerning for metastasis, Elevated alk phos, rostate likely source, oncology consultation pending, S/P B/L PNC in IR, pending bone bx by IR,  Hold asa and NSAIDS,     Oncology consult: Bone mets: 7/28: CT abd/pelvis showed below finding:Asymmetrically enlarged prostate gland; left side is larger  Melissa Emerald wall thickening likely hypertrophy however may represent cystitis in the appropriate clinical setting  Posterior aspect is invaded by the prostate gland  Bilateral hydronephrosis likely from obstruction at the UVJ secondary to enlarged/invading prostate  No renal stones  1 cm nodule in the right middle lobe not seen on the prior exam from 6/22/2020 concerning for metastasis  Trace bilateral effusions  Diffuse sclerosis throughout the osseous pelvis and in the L3, L2, T12, T11, T10, and T8 vertebral bodies is concerning for metastasis  There appears to be decreased signal adenopathy measuring 1 6 cm in the short axis (2:61)  Shotty periaortic adenopathy is noted  7/28: PSA was 135 7  7/27: CMP showed BUN 31, Cr 1 50, Alk phos 1,044, gianna calcium 9 3  Patient seen by urology during this hospitalization  S/P bilateral percutaneous nephrostomies    ABLA, Thrombocytopenia, WBC 9 52, HgB 7 7, MCV 93, PLT 134K diff normal  Plan: etiology of anemai 2/2 abla from hematuria  C/w management by urology  Consider IV venofer  EOD if continues to require pRBC consistently  Transfuse if HgB <7  Pending following labs SPEP, Immunoglobulin free IT chain, Vit B12, folate, iron, hemolyisis smear, LEIA, haptoglobin           Vital Signs:   Date/Time Temp Pulse Resp BP MAP (mmHg) SpO2 O2 Device   08/01/22 15:42:59 98 6 °F (37 °C) 68 17 125/64 84 99 % --   08/01/22 0910 -- -- -- -- -- -- None (Room air)   08/01/22 08:23:06 98 4 °F (36 9 °C) 71 -- 147/73 98 99 % --   07/31/22 2202 98 6 °F (37 °C) 75 19 143/70 94 99 % --   07/31/22 15:19:55 98 6 °F (37 °C) 69 20 133/71 92 99 % --   07/31/22 07:55:53 99 8 °F (37 7 °C) 74 18 157/79 105 96 % --   07/31/22 0100 -- -- -- -- -- -- None (Room air)   07/30/22 21:55:33 98 4 °F (36 9 °C) 73 20 115/57 76 95 % --   07/30/22 17:29:50 98 6 °F (37 °C) 69 19 135/67 90 97 % --   07/30/22 08:59:34 97 4 °F (36 3 °C) Abnormal  79 -- 139/65 90 96 % --   07/30/22 0849 -- 72 -- -- -- 98 % --   07/30/22 0700 -- 72 -- -- -- 97 % --         Pertinent Labs/Diagnostic Results:       Results from last 7 days   Lab Units 08/01/22  1104 07/31/22  0456 07/29/22  2216 07/29/22  0942 07/28/22  1722 07/28/22  0619   WBC Thousand/uL 9 52 11 46*  --   --   --  8 42   HEMOGLOBIN g/dL 7 7* 7 8* 6 0* 5 5* 6 8* 5 8*   HEMATOCRIT % 23 6* 23 8* 18 2* 16 9* 22 2* 17 9*   PLATELETS Thousands/uL 134* 104*  --   --   --  126*   NEUTROS ABS Thousands/µL 5 96 8 00*  --   --   --  4 96         Results from last 7 days   Lab Units 08/01/22  1104 07/31/22  0456 07/28/22  1722 07/28/22  0619 07/27/22  2307   SODIUM mmol/L 136 136 139 139 139   POTASSIUM mmol/L 4 5 5 1 4 7 4 2 4 1   CHLORIDE mmol/L 105 106 110* 111* 108   CO2 mmol/L 23 19* 17* 19* 19*   ANION GAP mmol/L 8 11 12 9 12   BUN mg/dL 17 21 22 27* 31*   CREATININE mg/dL 1 13 1 67* 1 07 1 18 1 50*   EGFR ml/min/1 73sq m 64 39 68 60 45   CALCIUM mg/dL 8 2* 8 3 8 5 8 2* 8 8     Results from last 7 days   Lab Units 07/27/22  2307   AST U/L 41   ALT U/L 11*   ALK PHOS U/L 1,044*   TOTAL PROTEIN g/dL 6 9   ALBUMIN g/dL 3 4*   TOTAL BILIRUBIN mg/dL 0 31         Results from last 7 days   Lab Units 08/01/22  1104 07/31/22  0456 07/28/22  1722 07/28/22  0619 07/27/22  2307   GLUCOSE RANDOM mg/dL 107 100 103 84 103           Results from last 7 days   Lab Units 07/28/22  0326 07/28/22  0113 07/27/22  2307   HS TNI 0HR ng/L  --   --  7   HS TNI 2HR ng/L  --  8  --    HSTNI D2 ng/L  --  1  --    HS TNI 4HR ng/L 10  --   --    HSTNI D4 ng/L 3  --   --          Results from last 7 days   Lab Units 07/27/22  2307   PROTIME seconds 14 8*   INR  1 18   PTT seconds 27           Results from last 7 days   Lab Units 07/28/22  0110   CLARITY UA  Clear   COLOR UA  Yellow   SPEC GRAV UA  1 020   PH UA  5 5   GLUCOSE UA mg/dl Negative   KETONES UA mg/dl Negative   BLOOD UA  Large*   PROTEIN UA mg/dl Trace*   NITRITE UA  Negative   BILIRUBIN UA  Negative   UROBILINOGEN UA E U /dl 0 2   LEUKOCYTES UA  Small*   WBC UA /hpf 10-20*   RBC UA /hpf 10-20*   BACTERIA UA /hpf Occasional   EPITHELIAL CELLS WET PREP /hpf Occasional           Results from last 7 days   Lab Units 07/28/22  1444 07/28/22  1424 07/28/22  0110   URINE CULTURE  No Growth <100 cfu/mL No Growth <100 cfu/mL 40,000-49,000 cfu/ml Aerococcus urinae*                 Medications:   Scheduled Medications:  allopurinol, 300 mg, Oral, Daily  cefTRIAXone, 1,000 mg, Intravenous, Q24H  finasteride, 5 mg, Oral, Daily  metoprolol succinate, 100 mg, Oral, Daily  pravastatin, 40 mg, Oral, Daily With Dinner  tamsulosin, 0 4 mg, Oral, Daily With Dinner      Continuous IV Infusions:  sodium chloride, 75 mL/hr, Intravenous, Continuous      PRN Meds:  acetaminophen, 650 mg, Oral, Q6H PRN        Discharge Plan: TBD    Network Utilization Review Department  ATTENTION: Please call with any questions or concerns to [phone] and carefully listen to the prompts so that you are directed to the right person  All voicemails are confidential   Billie Zamorapper all requests for admission clinical reviews, approved or denied determinations and any other requests to dedicated fax number below belonging to the campus where the patient is receiving treatment   List of dedicated fax numbers for the Facilities:  [address] DENIALS (Administrative/Medical Necessity) [phone]   [address] (Maternity/NICU/Pediatrics) [phone]   [address]  [address] [facility] [place] [address] [address] [place] [address] [address] [place] [address] [phone]

## 2022-08-01 NOTE — PLAN OF CARE
Problem: Potential for Falls  Goal: Patient will remain free of falls  Description: INTERVENTIONS:  - Educate patient/family on patient safety including physical limitations  - Instruct patient to call for assistance with activity   - Consult OT/PT to assist with strengthening/mobility   - Keep Call bell within reach  - Keep bed low and locked with side rails adjusted as appropriate  - Keep care items and personal belongings within reach  - Initiate and maintain comfort rounds  - Make Fall Risk Sign visible to staff  - Offer Toileting every 2 Hours, in advance of need  - Initiate/Maintain alarm  - Obtain necessary fall risk management equipment  - Apply yellow socks and bracelet for high fall risk patients  - Consider moving patient to room near nurses station  Outcome: Progressing     Problem: PAIN - ADULT  Goal: Verbalizes/displays adequate comfort level or baseline comfort level  Description: Interventions:  - Encourage patient to monitor pain and request assistance  - Assess pain using appropriate pain scale  - Administer analgesics based on type and severity of pain and evaluate response  - Implement non-pharmacological measures as appropriate and evaluate response  - Consider cultural and social influences on pain and pain management  - Notify physician/advanced practitioner if interventions unsuccessful or patient reports new pain  Outcome: Progressing     Problem: INFECTION - ADULT  Goal: Absence or prevention of progression during hospitalization  Description: INTERVENTIONS:  - Assess and monitor for signs and symptoms of infection  - Monitor lab/diagnostic results  - Monitor all insertion sites, i e  indwelling lines, tubes, and drains  - Monitor endotracheal if appropriate and nasal secretions for changes in amount and color  - [place] appropriate cooling/warming therapies per order  - Administer medications as ordered  - Instruct and encourage patient and family to use good hand hygiene technique  - Identify and instruct in appropriate isolation precautions for identified infection/condition  Outcome: Progressing  Goal: Absence of fever/infection during neutropenic period  Description: INTERVENTIONS:  - Monitor WBC    Outcome: Progressing     Problem: SAFETY ADULT  Goal: Patient will remain free of falls  Description: INTERVENTIONS:  - Educate patient/family on patient safety including physical limitations  - Instruct patient to call for assistance with activity   - Consult OT/PT to assist with strengthening/mobility   - Keep Call bell within reach  - Keep bed low and locked with side rails adjusted as appropriate  - Keep care items and personal belongings within reach  - Initiate and maintain comfort rounds  - Make Fall Risk Sign visible to staff  - Offer Toileting every 2 Hours, in advance of need  - Initiate/Maintain alarm  - Obtain necessary fall risk management equipment  - Apply yellow socks and bracelet for high fall risk patients  - Consider moving patient to room near nurses station  Outcome: Progressing  Goal: Maintain or return to baseline ADL function  Description: INTERVENTIONS:  - Educate patient/family on patient safety including physical limitations  - Instruct patient to call for assistance with activity   - Consult OT/PT to assist with strengthening/mobility   - Keep Call bell within reach  - Keep bed low and locked with side rails adjusted as appropriate  - Keep care items and personal belongings within reach  - Initiate and maintain comfort rounds  - Make Fall Risk Sign visible to staff  - Offer Toileting every 2 Hours, in advance of need  - Initiate/Maintain alarm  - Obtain necessary fall risk management equipment  - Apply yellow socks and bracelet for high fall risk patients  - Consider moving patient to room near nurses station  Outcome: Progressing  Goal: Maintains/Returns to pre admission functional level  Description: INTERVENTIONS:  - Perform BMAT or MOVE assessment daily    - Set and communicate daily mobility goal to care team and patient/family/caregiver  - Collaborate with rehabilitation services on mobility goals if consulted  - Perform Range of Motion times a day  - Reposition patient every  hours  - Dangle patient  times a day  - Stand patient  times a day  - Ambulate patient  times a day  - Out of bed to chair times a day   - Out of bed for meals  times a day  - Out of bed for toileting  - Record patient progress and toleration of activity level   Outcome: Progressing     Problem: DISCHARGE PLANNING  Goal: Discharge to home or other facility with appropriate resources  Description: INTERVENTIONS:  - Identify barriers to discharge w/patient and caregiver  - Arrange for needed discharge resources and transportation as appropriate  - Identify discharge learning needs (meds, wound care, etc )  - Arrange for interpretive services to assist at discharge as needed  - Refer to Case Management Department for coordinating discharge planning if the patient needs post-hospital services based on physician/advanced practitioner order or complex needs related to functional status, cognitive ability, or social support system  Outcome: Progressing     Problem: Knowledge Deficit  Goal: Patient/family/caregiver demonstrates understanding of disease process, treatment plan, medications, and discharge instructions  Description: Complete learning assessment and assess knowledge base    Interventions:  - Provide teaching at level of understanding  - Provide teaching via preferred learning methods  Outcome: Progressing     Problem: HEMATOLOGIC - ADULT  Goal: Maintains hematologic stability  Description: INTERVENTIONS  - Assess for signs and symptoms of bleeding or hemorrhage  - Monitor labs  - Administer supportive blood products/factors as ordered and appropriate  Outcome: Progressing     Problem: MOBILITY - ADULT  Goal: Maintain or return to baseline ADL function  Description: INTERVENTIONS:  - Educate patient/family on patient safety including physical limitations  - Instruct patient to call for assistance with activity   - Consult OT/PT to assist with strengthening/mobility   - Keep Call bell within reach  - Keep bed low and locked with side rails adjusted as appropriate  - Keep care items and personal belongings within reach  - Initiate and maintain comfort rounds  - Make Fall Risk Sign visible to staff  - Offer Toileting every 2 Hours, in advance of need  - Initiate/Maintain alarm  - Obtain necessary fall risk management equipment  - Apply yellow socks and bracelet for high fall risk patients  - Consider moving patient to room near nurses station  Outcome: Progressing  Goal: Maintains/Returns to pre admission functional level  Description: INTERVENTIONS:  - Perform BMAT or MOVE assessment daily    - Set and communicate daily mobility goal to care team and patient/family/caregiver  - Collaborate with rehabilitation services on mobility goals if consulted  - Perform Range of Motion times a day  - Reposition patient every hours    - Dangle patient  times a day  - Stand patient times a day  - Ambulate patient  times a day  - Out of bed to chair  times a day   - Out of bed for meals  times a day  - Out of bed for toileting  - Record patient progress and toleration of activity level   Outcome: Progressing     Problem: Prexisting or High Potential for Compromised Skin Integrity  Goal: Skin integrity is maintained or improved  Description: INTERVENTIONS:  - Identify patients at risk for skin breakdown  - Assess and monitor skin integrity  - Assess and monitor nutrition and hydration status  - Monitor labs   - Assess for incontinence   - Turn and reposition patient  - Assist with mobility/ambulation  - Relieve pressure over bony prominences  - Avoid friction and shearing  - Provide appropriate hygiene as needed including keeping skin clean and dry  - Evaluate need for skin moisturizer/barrier cream  - Collaborate with interdisciplinary team   - Patient/family teaching  - Consider wound care consult   Outcome: Progressing     Problem: Nutrition/Hydration-ADULT  Goal: Nutrient/Hydration intake appropriate for improving, restoring or maintaining nutritional needs  Description: Monitor and assess patient's nutrition/hydration status for malnutrition  Collaborate with interdisciplinary team and initiate plan and interventions as ordered  Monitor patient's weight and dietary intake as ordered or per policy  Utilize nutrition screening tool and intervene as necessary  Determine patient's food preferences and provide high-protein, high-caloric foods as appropriate       INTERVENTIONS:  - Monitor oral intake, urinary output, labs, and treatment plans  - Assess nutrition and hydration status and recommend course of action  - Evaluate amount of meals eaten  - Assist patient with eating if necessary   - Allow adequate time for meals  - Recommend/ encourage appropriate diets, oral nutritional supplements, and vitamin/mineral supplements  - Order, calculate, and assess calorie counts as needed  - Recommend, monitor, and adjust tube feedings and TPN/PPN based on assessed needs  - Assess need for intravenous fluids  - Provide specific nutrition/hydration education as appropriate  - Include patient/family/caregiver in decisions related to nutrition  Outcome: Progressing

## 2022-08-01 NOTE — ASSESSMENT & PLAN NOTE
· Patient presents with gross hematuria x 1m  Had had prostate biopsy in past, last bx 10/2021, cytology negative  Follows with a urology in [address] [place] [address]    · CT a/p 07/27: asymmetrically enlarged prostate gland; left side is larger  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  Bilateral hydronephrosis likely from obstruction at the UVJ secondary to enlarged/invading prostate  No renal stones  · S/p B/l PCN by IR  · Hold aspirin/nsaid   · Urinary retention protocol  · S/p 3 units prbc     · Hemoglobin has since stabilized

## 2022-08-02 ENCOUNTER — APPOINTMENT (INPATIENT)
Dept: CT IMAGING | Facility: HOSPITAL | Age: 72
DRG: 715 | End: 2022-08-02
Payer: MEDICARE

## 2022-08-02 LAB
ANION GAP SERPL CALCULATED.3IONS-SCNC: 9 MMOL/L (ref 4–13)
BASOPHILS # BLD AUTO: 0.03 THOUSANDS/ÂΜL (ref 0–0.1)
BASOPHILS NFR BLD AUTO: 0 % (ref 0–1)
BLD SMEAR INTERP: NORMAL
BUN SERPL-MCNC: 15 MG/DL (ref 5–25)
CALCIUM SERPL-MCNC: 8.8 MG/DL (ref 8.3–10.1)
CHLORIDE SERPL-SCNC: 101 MMOL/L (ref 96–108)
CO2 SERPL-SCNC: 23 MMOL/L (ref 21–32)
CREAT SERPL-MCNC: 0.98 MG/DL (ref 0.6–1.3)
EOSINOPHIL # BLD AUTO: 0.33 THOUSAND/ÂΜL (ref 0–0.61)
EOSINOPHIL NFR BLD AUTO: 3 % (ref 0–6)
ERYTHROCYTE [DISTWIDTH] IN BLOOD BY AUTOMATED COUNT: 15.8 % (ref 11.6–15.1)
FERRITIN SERPL-MCNC: 239 NG/ML (ref 8–388)
FOLATE SERPL-MCNC: 8.3 NG/ML (ref 3.1–17.5)
GFR SERPL CREATININE-BSD FRML MDRD: 76 ML/MIN/1.73SQ M
GLUCOSE SERPL-MCNC: 87 MG/DL (ref 65–140)
HCT VFR BLD AUTO: 24.6 % (ref 36.5–49.3)
HGB BLD-MCNC: 8 G/DL (ref 12–17)
IMM GRANULOCYTES # BLD AUTO: 0.07 THOUSAND/UL (ref 0–0.2)
IMM GRANULOCYTES NFR BLD AUTO: 1 % (ref 0–2)
IRON SATN MFR SERPL: 9 % (ref 20–50)
IRON SERPL-MCNC: 25 UG/DL (ref 65–175)
LYMPHOCYTES # BLD AUTO: 2.28 THOUSANDS/ÂΜL (ref 0.6–4.47)
LYMPHOCYTES NFR BLD AUTO: 23 % (ref 14–44)
MCH RBC QN AUTO: 30.2 PG (ref 26.8–34.3)
MCHC RBC AUTO-ENTMCNC: 32.5 G/DL (ref 31.4–37.4)
MCV RBC AUTO: 93 FL (ref 82–98)
MONOCYTES # BLD AUTO: 1.04 THOUSAND/ÂΜL (ref 0.17–1.22)
MONOCYTES NFR BLD AUTO: 11 % (ref 4–12)
NEUTROPHILS # BLD AUTO: 6.19 THOUSANDS/ÂΜL (ref 1.85–7.62)
NEUTS SEG NFR BLD AUTO: 62 % (ref 43–75)
NRBC BLD AUTO-RTO: 1 /100 WBCS
PLATELET # BLD AUTO: 142 THOUSANDS/UL (ref 149–390)
PMV BLD AUTO: 12.3 FL (ref 8.9–12.7)
POTASSIUM SERPL-SCNC: 4.9 MMOL/L (ref 3.5–5.3)
RBC # BLD AUTO: 2.65 MILLION/UL (ref 3.88–5.62)
SODIUM SERPL-SCNC: 133 MMOL/L (ref 135–147)
TIBC SERPL-MCNC: 292 UG/DL (ref 250–450)
VIT B12 SERPL-MCNC: 652 PG/ML (ref 100–900)
WBC # BLD AUTO: 9.94 THOUSAND/UL (ref 4.31–10.16)

## 2022-08-02 PROCEDURE — 83010 ASSAY OF HAPTOGLOBIN QUANT: CPT | Performed by: INTERNAL MEDICINE

## 2022-08-02 PROCEDURE — 85025 COMPLETE CBC W/AUTO DIFF WBC: CPT | Performed by: INTERNAL MEDICINE

## 2022-08-02 PROCEDURE — 84165 PROTEIN E-PHORESIS SERUM: CPT | Performed by: PATHOLOGY

## 2022-08-02 PROCEDURE — 88341 IMHCHEM/IMCYTCHM EA ADD ANTB: CPT | Performed by: PATHOLOGY

## 2022-08-02 PROCEDURE — 20220 BONE BIOPSY TROCAR/NDL SUPFC: CPT | Performed by: RADIOLOGY

## 2022-08-02 PROCEDURE — 88342 IMHCHEM/IMCYTCHM 1ST ANTB: CPT | Performed by: PATHOLOGY

## 2022-08-02 PROCEDURE — 83550 IRON BINDING TEST: CPT | Performed by: INTERNAL MEDICINE

## 2022-08-02 PROCEDURE — 99152 MOD SED SAME PHYS/QHP 5/>YRS: CPT | Performed by: RADIOLOGY

## 2022-08-02 PROCEDURE — 82746 ASSAY OF FOLIC ACID SERUM: CPT | Performed by: INTERNAL MEDICINE

## 2022-08-02 PROCEDURE — 83521 IG LIGHT CHAINS FREE EACH: CPT | Performed by: INTERNAL MEDICINE

## 2022-08-02 PROCEDURE — 99232 SBSQ HOSP IP/OBS MODERATE 35: CPT | Performed by: INTERNAL MEDICINE

## 2022-08-02 PROCEDURE — 82607 VITAMIN B-12: CPT | Performed by: INTERNAL MEDICINE

## 2022-08-02 PROCEDURE — 84165 PROTEIN E-PHORESIS SERUM: CPT | Performed by: INTERNAL MEDICINE

## 2022-08-02 PROCEDURE — 0QB33ZX EXCISION OF LEFT PELVIC BONE, PERCUTANEOUS APPROACH, DIAGNOSTIC: ICD-10-PCS | Performed by: RADIOLOGY

## 2022-08-02 PROCEDURE — 88305 TISSUE EXAM BY PATHOLOGIST: CPT | Performed by: PATHOLOGY

## 2022-08-02 PROCEDURE — 38222 DX BONE MARROW BX & ASPIR: CPT

## 2022-08-02 PROCEDURE — 99152 MOD SED SAME PHYS/QHP 5/>YRS: CPT

## 2022-08-02 PROCEDURE — 77012 CT SCAN FOR NEEDLE BIOPSY: CPT | Performed by: RADIOLOGY

## 2022-08-02 PROCEDURE — 80048 BASIC METABOLIC PNL TOTAL CA: CPT | Performed by: INTERNAL MEDICINE

## 2022-08-02 PROCEDURE — 82728 ASSAY OF FERRITIN: CPT | Performed by: INTERNAL MEDICINE

## 2022-08-02 PROCEDURE — 88333 PATH CONSLTJ SURG CYTO XM 1: CPT | Performed by: PATHOLOGY

## 2022-08-02 PROCEDURE — 83540 ASSAY OF IRON: CPT | Performed by: INTERNAL MEDICINE

## 2022-08-02 RX ORDER — SENNOSIDES 8.6 MG
1 TABLET ORAL ONCE
Status: COMPLETED | OUTPATIENT
Start: 2022-08-02 | End: 2022-08-02

## 2022-08-02 RX ORDER — MIDAZOLAM HYDROCHLORIDE 2 MG/2ML
INJECTION, SOLUTION INTRAMUSCULAR; INTRAVENOUS CODE/TRAUMA/SEDATION MEDICATION
Status: COMPLETED | OUTPATIENT
Start: 2022-08-02 | End: 2022-08-02

## 2022-08-02 RX ORDER — DOCUSATE SODIUM 100 MG/1
100 CAPSULE, LIQUID FILLED ORAL 2 TIMES DAILY
Status: DISCONTINUED | OUTPATIENT
Start: 2022-08-03 | End: 2022-08-12 | Stop reason: HOSPADM

## 2022-08-02 RX ORDER — SENNOSIDES 8.6 MG
1 TABLET ORAL
Status: DISCONTINUED | OUTPATIENT
Start: 2022-08-02 | End: 2022-08-02

## 2022-08-02 RX ORDER — FENTANYL CITRATE 50 UG/ML
INJECTION, SOLUTION INTRAMUSCULAR; INTRAVENOUS CODE/TRAUMA/SEDATION MEDICATION
Status: COMPLETED | OUTPATIENT
Start: 2022-08-02 | End: 2022-08-02

## 2022-08-02 RX ORDER — LIDOCAINE WITH 8.4% SOD BICARB 0.9%(10ML)
SYRINGE (ML) INJECTION CODE/TRAUMA/SEDATION MEDICATION
Status: COMPLETED | OUTPATIENT
Start: 2022-08-02 | End: 2022-08-02

## 2022-08-02 RX ADMIN — MIDAZOLAM HYDROCHLORIDE 1 MG: 1 INJECTION, SOLUTION INTRAMUSCULAR; INTRAVENOUS at 10:29

## 2022-08-02 RX ADMIN — Medication 10 ML: at 10:35

## 2022-08-02 RX ADMIN — OXYCODONE HYDROCHLORIDE 5 MG: 5 TABLET ORAL at 22:04

## 2022-08-02 RX ADMIN — FENTANYL CITRATE 50 MCG: 50 INJECTION, SOLUTION INTRAMUSCULAR; INTRAVENOUS at 10:29

## 2022-08-02 RX ADMIN — SODIUM CHLORIDE 75 ML/HR: 9 INJECTION, SOLUTION INTRAVENOUS at 07:15

## 2022-08-02 RX ADMIN — SENNOSIDES 8.6 MG: 8.6 TABLET, FILM COATED ORAL at 22:04

## 2022-08-02 RX ADMIN — METOPROLOL SUCCINATE 100 MG: 100 TABLET, EXTENDED RELEASE ORAL at 09:02

## 2022-08-02 RX ADMIN — TAMSULOSIN HYDROCHLORIDE 0.4 MG: 0.4 CAPSULE ORAL at 16:30

## 2022-08-02 RX ADMIN — PRAVASTATIN SODIUM 40 MG: 40 TABLET ORAL at 16:30

## 2022-08-02 RX ADMIN — CEFTRIAXONE 1000 MG: 1 INJECTION, SOLUTION INTRAVENOUS at 09:04

## 2022-08-02 RX ADMIN — OXYCODONE HYDROCHLORIDE 5 MG: 5 TABLET ORAL at 00:35

## 2022-08-02 RX ADMIN — OXYCODONE HYDROCHLORIDE 5 MG: 5 TABLET ORAL at 09:02

## 2022-08-02 RX ADMIN — FINASTERIDE 5 MG: 5 TABLET, FILM COATED ORAL at 09:04

## 2022-08-02 RX ADMIN — ALLOPURINOL 300 MG: 300 TABLET ORAL at 09:02

## 2022-08-02 NOTE — BRIEF OP NOTE (RAD/CATH)
INTERVENTIONAL RADIOLOGY PROCEDURE NOTE    Date: 8/2/2022    Procedure: Left iliac bone lesion biopsy    Preoperative diagnosis:   1  Hematuria    2  Acute blood loss anemia    3  Anemia    4  Other hydronephrosis    5  Benign prostatic hyperplasia without lower urinary tract symptoms    6  Metastasis of unknown origin ([facility] [address] )    7  [facility]         Postoperative diagnosis: Same  Surgeon: Amrit Coker MD     Assistant: None  No qualified resident was available  Blood loss: 1 mL    Specimens: 3 core needle samples placed into formalin  Findings: Successful left iliac bone sclerotic lesion biopsy  Complications: None immediate      Anesthesia: conscious sedation and local

## 2022-08-02 NOTE — ASSESSMENT & PLAN NOTE
· Patient presents with gross hematuria x 1m  Had had prostate biopsy in past, last bx 10/2021, cytology negative  Follows with a urology in [address] [place] [address]    · CT a/p 07/27: asymmetrically enlarged prostate gland; left side is larger  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  Bilateral hydronephrosis likely from obstruction at the UVJ secondary to enlarged/invading prostate  No renal stones  · S/p B/l PCN by IR  · Hold aspirin/nsaid   · Urinary retention protocol  · S/p 3 units prbc     · Hemoglobin has since stabilized

## 2022-08-02 NOTE — ASSESSMENT & PLAN NOTE
CT a/p 07/27: revealed 1 cm nodule in the right middle lobe not seen on the prior exam from 6/22/2020 concerning for metastasis and diffuse sclerosis throughout the osseous pelvis and in the L3, L2, T12, T11, T10, and T8 vertebral bodies also concerning for metastasis   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 as well  Prostate likely source   Oncology consult and plan for IR guided biopsy today  Will have outpatient follow-up with Oncology

## 2022-08-02 NOTE — PLAN OF CARE
Problem: SAFETY ADULT  Goal: Patient will remain free of falls  Description: INTERVENTIONS:  - Educate patient/family on patient safety including physical limitations  - Instruct patient to call for assistance with activity   - Consult OT/PT to assist with strengthening/mobility   - Keep Call bell within reach  - Keep bed low and locked with side rails adjusted as appropriate  - Keep care items and personal belongings within reach  - Initiate and maintain comfort rounds  - Make Fall Risk Sign visible to staff  - Offer Toileting every 2 Hours, in advance of need  - Initiate/Maintain alarm  - Obtain necessary fall risk management equipment  - Apply yellow socks and bracelet for high fall risk patients  - Consider moving patient to room near nurses station  Outcome: Progressing

## 2022-08-02 NOTE — PROGRESS NOTES
[address] [facility]  Progress Note - Clive Sanches 1950, 67 y o  male MRN: [id number]  Unit/Bed#: -[place] Encounter: [id number]  Primary Care Provider: TRICIA Segura   Date and time admitted to hospital: 7/27/2022 11:40 PM    * Acute blood loss anemia  Assessment & Plan  Hgb 6 6 on admission  Last hgb 13 on labs from 10/2021  Has assoc fatigue   Likely 2/2 hematuria   Hematuria since resolved  Status post 3 units packed red blood cells  Has since stabilized at approximately 8    MICA (acute kidney injury) ([facility] [address] )  Assessment & Plan  · Secondary to by hydronephrosis  · Presented creatinine 1 5  · Resolved with ivf    Metastasis of unknown origin [facility])  Assessment & Plan  CT a/p 07/27: revealed 1 cm nodule in the right middle lobe not seen on the prior exam from 6/22/2020 concerning for metastasis and diffuse sclerosis throughout the osseous pelvis and in the L3, L2, T12, T11, T10, and T8 vertebral bodies also concerning for metastasis   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 as well  Prostate likely source   Oncology consult and plan for IR guided biopsy today  Will have outpatient follow-up with Oncology    Gross hematuria  Assessment & Plan  · Patient presents with gross hematuria x 1m  Had had prostate biopsy in past, last bx 10/2021, cytology negative  Follows with a urology in [address] [place] [address]    · CT a/p 07/27: asymmetrically enlarged prostate gland; left side is larger  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  Bilateral hydronephrosis likely from obstruction at the UVJ secondary to enlarged/invading prostate  No renal stones  · S/p B/l PCN by IR  · Hold aspirin/nsaid   · Urinary retention protocol  · S/p 3 units prbc   · Hemoglobin has since stabilized      VTE Pharmacologic Prophylaxis:   Pharmacologic: Heparin  Mechanical VTE Prophylaxis in Place: Yes    Patient Centered Rounds: I have performed bedside rounds with nursing staff today      Discussions with Specialists or Other Care Team Provider: cm, nursing    Education and Discussions with Family / Patient: pt    Time Spent for Care: 30 minutes  More than 50% of total time spent on counseling and coordination of care as described above  Current Length of Stay: 5 day(s)    Current Patient Status: Inpatient   Certification Statement: The patient will continue to require additional inpatient hospital stay due to see below    Discharge Plan:  Anticipate medically cleared today/next 24 hours  Awaiting home dispo planning    Code Status: Level 1 - Full Code      Subjective:   Denies chest pain, t cough, fevers, chills    Objective:     Vitals:   Temp (24hrs), Av 5 °F (36 9 °C), Min:98 1 °F (36 7 °C), Max:98 9 °F (37 2 °C)    Temp:  [98 1 °F (36 7 °C)-98 9 °F (37 2 °C)] 98 1 °F (36 7 °C)  HR:  [68-87] 70  Resp:  [12-22] 16  BP: (119-175)/(64-83) 129/67  SpO2:  [97 %-100 %] 100 %  Body mass index is 30 4 kg/m²  Input and Output Summary (last 24 hours): Intake/Output Summary (Last 24 hours) at 2022 1217  Last data filed at 2022 7275  Gross per 24 hour   Intake 1260 ml   Output 2550 ml   Net -1290 ml       Physical Exam:     Physical Exam  Constitutional:       General: He is not in acute distress  Appearance: He is well-developed  He is not diaphoretic  HENT:      Head: Normocephalic and atraumatic  Nose: Nose normal       Mouth/Throat:      Pharynx: No oropharyngeal exudate  Eyes:      General: No scleral icterus  Conjunctiva/sclera: Conjunctivae normal    Cardiovascular:      Rate and Rhythm: Normal rate and regular rhythm  Heart sounds: Normal heart sounds  No murmur heard  No friction rub  No gallop  Pulmonary:      Effort: Pulmonary effort is normal  No respiratory distress  Breath sounds: Normal breath sounds  No wheezing or rales  Chest:      Chest wall: No tenderness  Abdominal:      General: Bowel sounds are normal  There is no distension        Palpations: Abdomen is soft  Tenderness: There is no abdominal tenderness  There is no guarding  Musculoskeletal:         General: No tenderness or deformity  Normal range of motion  Cervical back: Normal range of motion and neck supple  Skin:     General: Skin is warm and dry  Findings: No erythema  Neurological:      Mental Status: He is alert  Mental status is at baseline  (     Additional Data:     Labs:    Results from last 7 days   Lab Units 08/02/22  0507   WBC Thousand/uL 9 94   HEMOGLOBIN g/dL 8 0*   HEMATOCRIT % 24 6*   PLATELETS Thousands/uL 142*   NEUTROS PCT % 62   LYMPHS PCT % 23   MONOS PCT % 11   EOS PCT % 3     Results from last 7 days   Lab Units 08/02/22  0507 07/28/22  0619 07/27/22  2307   SODIUM mmol/L 133*   < > 139   POTASSIUM mmol/L 4 9   < > 4 1   CHLORIDE mmol/L 101   < > 108   CO2 mmol/L 23   < > 19*   BUN mg/dL 15   < > 31*   CREATININE mg/dL 0 98   < > 1 50*   ANION GAP mmol/L 9   < > 12   CALCIUM mg/dL 8 8   < > 8 8   ALBUMIN g/dL  --   --  3 4*   TOTAL BILIRUBIN mg/dL  --   --  0 31   ALK PHOS U/L  --   --  1,044*   ALT U/L  --   --  11*   AST U/L  --   --  41   GLUCOSE RANDOM mg/dL 87   < > 103    < > = values in this interval not displayed  Results from last 7 days   Lab Units 07/27/22  2307   INR  1 18                       * I Have Reviewed All Lab Data Listed Above  * Additional Pertinent Lab Tests Reviewed:  All Labs Within Last 24 Hours Reviewed    Imaging:    Imaging Reports Reviewed Today Include: na  Imaging Personally Reviewed by Myself Includes:  na    Recent Cultures (last 7 days):     Results from last 7 days   Lab Units 07/28/22  1444 07/28/22  1424 07/28/22  0110   URINE CULTURE  No Growth <100 cfu/mL No Growth <100 cfu/mL 40,000-49,000 cfu/ml Aerococcus urinae*       Last 24 Hours Medication List:   Current Facility-Administered Medications   Medication Dose Route Frequency Provider Last Rate    acetaminophen  650 mg Oral Q6H PRN [employer]  allopurinol  300 mg Oral Daily Yvonne Denis PA-C      cefTRIAXone  1,000 mg Intravenous Q24H Ion Luevano MD 1,000 mg (08/02/22 0904)    finasteride  5 mg Oral Daily Yvonne Denis PA-C      metoprolol succinate  100 mg Oral Daily Jovi Prince      oxyCODONE  5 mg Oral Q6H PRN Candice Traore PA-C      pravastatin  40 mg Oral Daily With [place], [place]      sodium chloride  75 mL/hr Intravenous Continuous Yvonne Denis PA-C 75 mL/hr (08/02/22 0715)    tamsulosin  0 4 mg Oral Daily With TRICIA Loredo          Today, Patient Was Seen By: Karol Carreno MD    ** Please Note: Dictation voice to text software may have been used in the creation of this document   **

## 2022-08-02 NOTE — CASE MANAGEMENT
Case Management Assessment & Discharge Planning Note    Patient name Nadia Leon  Location /-82 MRN [id number]  : 1950 Date 2022       Current Admission Date: 2022  Current Admission Diagnosis:Acute blood loss anemia   Patient Active Problem List    Diagnosis Date Noted    Gross hematuria 2022    Metastasis of unknown origin ([facility] [address] ) 2022    Acute blood loss anemia 2022    Right shoulder pain 2022    [facility] 20[facility]    Generalized weakness 2020    Need for vaccination 2020    Cough 2020    Sinus congestion 2020    Medicare annual wellness visit, subsequent 2020    Encounter to establish care 06/10/2020    Shortness of breath 2020    Prolonged Q-T interval on ECG 2020    Acute respiratory failure with hypoxia ([facility] ) secondary to presumed COVID-19 infection 2020    Suspected COVID-19 virus infection 2020    Hypertension 2020      LOS (days): 5  Geometric Mean LOS (GMLOS) (days): 3 50  Days to GMLOS:-1 6     OBJECTIVE:    Risk of Unplanned Readmission Score: 12 15      Current admission status: Inpatient     Preferred Pharmacy:   [address] [address]  Phone: [phone] Fax: [phone]    Primary Care Provider: TRICIA Louis    Primary Insurance: MEDICARE MISC REPLACEMENT  Secondary Insurance:     ASSESSMENT:  [address] Proxies    There are no active Health Care Proxies on file         Readmission Root Cause  30 Day Readmission: No    Patient Information  Admitted from[de-identified] Home  Mental Status: Alert (Cook Islander speaking only-CM did not speak with patient directly but dtr was conversing with him)  During Assessment patient was accompanied by: Daughter  Assessment information provided by[de-identified] Daughter  Primary Caregiver: Self  Support Systems: Daughter, Family members  What city do you live in?: Patient was living with his family in [place] but they can no longer care for him so he is now with his daughter and her family at [address] [place] [address], [facility], [place] [address]  Type of Current Residence: 2 story home (Patient's daughter is not sure what floor she will be setting him up on )  In the last 12 months, was there a time when you were not able to pay the mortgage or rent on time?: No  In the last 12 months, how many places have you lived?: 2  In the last 12 months, was there a time when you did not have a steady place to sleep or slept in a shelter (including now)?: No  Living Arrangements: Lives w/ Daughter (+ her spouse and 25 y o  son)    Activities of Daily Living Prior to Admission  Functional Status: Independent  Completes ADLs independently?: Yes  Ambulates independently?: Yes  Does patient use assisted devices?: Yes  Assisted Devices (DME) used: Straight Cane  Does patient currently own DME?: Yes  What DME does the patient currently own?: Straight Cane  Does patient have a history of Outpatient Therapy (PT/OT)?: No  Does the patient have a history of Short-Term Rehab?: No  Does patient have a history of HHC?: Yes ([facility] when he stayed w/ dtr 2 years ago)  Does patient currently have [facility]?: No     Patient Information Continued  Does patient have prescription coverage?: Yes  Does patient receive dialysis treatments?: No    DISCHARGE DETAILS:    Discharge planning discussed with[de-identified] patient and daughter at bedside  Freedom of Choice: Yes  Comments - Freedom of Choice: As per SLIM, patient is medically cleared for dc  CM met with patient and daughter at bedside to introduce self/role and discuss safe discharge plan  Jonnathan had told ROBERTA today she'd like HHC and a hospital bed at home  She told CM that she actually thinks that patient requires IP STR as he cannot ambulate and she would like PT/OT to evaluate for same prior to dc   She knows his [employer] may be a placement barrier and is receptive to a blanket referral to see if we have any accepting agencies  Patient reports he did receive through his first [place] booster and daughter agreed to call his pharmacy to try and obtain documentation of same  CM reached out to PT/OT and asked them to prioritize their evaluation for the morning  CM contacted family/caregiver?: Yes  Were Treatment Team discharge recommendations reviewed with patient/caregiver?: No (TBD)     Contacts  Patient Contacts: dtr Inolis  Relationship to Patient[de-identified] Family  Contact Method: In Person  Reason/Outcome: Continuity of Care, Discharge Planning, Referral    [facility]         Is the patient interested in [facility] at discharge?: No     Other Referral/Resources/Interventions Provided:  Interventions: Short Term Rehab  Referral Comments: 40 mile blanket referral sent via [address]  CM uploaded patient's insurance card and asked if anyone is in network or would be willing to go for one time exception  Treatment Team Recommendation:  (TBD)  Discharge Destination Plan[de-identified] Short Term Rehab (family is hopeful for STR)  Transport at Discharge :  Other (Comment) (TBD)

## 2022-08-02 NOTE — CASE MANAGEMENT
Case Management Progress Note    Patient name Clive Sanches  Location /-44 MRN [id number]  : 1950 Date 2022       LOS (days): 5  Geometric Mean LOS (GMLOS) (days): 3 50  Days to GMLOS:-1 4        OBJECTIVE:        Current admission status: Inpatient  Preferred Pharmacy:   [facility] [address]  [address] [place] [address]  Phone: [phone] Fax: [phone]    Primary Care Provider: Rosie Valdes    Primary Insurance: MEDICARE MISC REPLACEMENT  Secondary Insurance:     PROGRESS NOTE:  Patient reviewed with SLIM during care coordination rounds  Patient is medically stable for dc at this time and patient's daughter asked ROBERTA if we could arrange for [facility] including SN to assist with nephrostomy tube management as well as a hospital bed at home  CM agreed to f/u re: same and did call and had to leave a VM for patient's daughter  She is not at bedside at this time and patient is primarily [address] speaking  Daughter did tell SLIM she will be coming to visit shortly and CM asked RN to notify her of family's arrival so CM can coordinate dc plan at bedside to anticipated dc later today  Of not, [place] have a [place] [facility] replacement plan as well as a [place] address  This will likely be a barrier to [facility] at dc but CM will speak with family re: available options  CM will continue to follow

## 2022-08-03 LAB
ANION GAP SERPL CALCULATED.3IONS-SCNC: 9 MMOL/L (ref 4–13)
BASOPHILS # BLD AUTO: 0.02 THOUSANDS/ÂΜL (ref 0–0.1)
BASOPHILS NFR BLD AUTO: 0 % (ref 0–1)
BUN SERPL-MCNC: 17 MG/DL (ref 5–25)
CALCIUM SERPL-MCNC: 8.2 MG/DL (ref 8.3–10.1)
CHLORIDE SERPL-SCNC: 101 MMOL/L (ref 96–108)
CO2 SERPL-SCNC: 23 MMOL/L (ref 21–32)
CREAT SERPL-MCNC: 0.99 MG/DL (ref 0.6–1.3)
EOSINOPHIL # BLD AUTO: 0.19 THOUSAND/ÂΜL (ref 0–0.61)
EOSINOPHIL NFR BLD AUTO: 2 % (ref 0–6)
ERYTHROCYTE [DISTWIDTH] IN BLOOD BY AUTOMATED COUNT: 15.3 % (ref 11.6–15.1)
GFR SERPL CREATININE-BSD FRML MDRD: 75 ML/MIN/1.73SQ M
GLUCOSE SERPL-MCNC: 94 MG/DL (ref 65–140)
HAPTOGLOB SERPL-MCNC: 360 MG/DL (ref 34–355)
HCT VFR BLD AUTO: 22.7 % (ref 36.5–49.3)
HGB BLD-MCNC: 7.5 G/DL (ref 12–17)
IMM GRANULOCYTES # BLD AUTO: 0.07 THOUSAND/UL (ref 0–0.2)
IMM GRANULOCYTES NFR BLD AUTO: 1 % (ref 0–2)
KAPPA LC FREE SER-MCNC: 34 MG/L (ref 3.3–19.4)
KAPPA LC FREE/LAMBDA FREE SER: 1.25 {RATIO} (ref 0.26–1.65)
LAMBDA LC FREE SERPL-MCNC: 27.1 MG/L (ref 5.7–26.3)
LYMPHOCYTES # BLD AUTO: 1.72 THOUSANDS/ÂΜL (ref 0.6–4.47)
LYMPHOCYTES NFR BLD AUTO: 17 % (ref 14–44)
MCH RBC QN AUTO: 30.1 PG (ref 26.8–34.3)
MCHC RBC AUTO-ENTMCNC: 33 G/DL (ref 31.4–37.4)
MCV RBC AUTO: 91 FL (ref 82–98)
MONOCYTES # BLD AUTO: 1.42 THOUSAND/ÂΜL (ref 0.17–1.22)
MONOCYTES NFR BLD AUTO: 14 % (ref 4–12)
NEUTROPHILS # BLD AUTO: 6.62 THOUSANDS/ÂΜL (ref 1.85–7.62)
NEUTS SEG NFR BLD AUTO: 66 % (ref 43–75)
NRBC BLD AUTO-RTO: 0 /100 WBCS
PLATELET # BLD AUTO: 146 THOUSANDS/UL (ref 149–390)
PLATELET # BLD AUTO: 154 THOUSANDS/UL (ref 149–390)
PMV BLD AUTO: 12.3 FL (ref 8.9–12.7)
PMV BLD AUTO: 12.4 FL (ref 8.9–12.7)
POTASSIUM SERPL-SCNC: 4.8 MMOL/L (ref 3.5–5.3)
RBC # BLD AUTO: 2.49 MILLION/UL (ref 3.88–5.62)
SODIUM SERPL-SCNC: 133 MMOL/L (ref 135–147)
WBC # BLD AUTO: 10.04 THOUSAND/UL (ref 4.31–10.16)

## 2022-08-03 PROCEDURE — 97163 PT EVAL HIGH COMPLEX 45 MIN: CPT

## 2022-08-03 PROCEDURE — 97167 OT EVAL HIGH COMPLEX 60 MIN: CPT

## 2022-08-03 PROCEDURE — 85025 COMPLETE CBC W/AUTO DIFF WBC: CPT | Performed by: INTERNAL MEDICINE

## 2022-08-03 PROCEDURE — 99232 SBSQ HOSP IP/OBS MODERATE 35: CPT | Performed by: INTERNAL MEDICINE

## 2022-08-03 PROCEDURE — 80048 BASIC METABOLIC PNL TOTAL CA: CPT | Performed by: INTERNAL MEDICINE

## 2022-08-03 PROCEDURE — 97535 SELF CARE MNGMENT TRAINING: CPT

## 2022-08-03 PROCEDURE — 85049 AUTOMATED PLATELET COUNT: CPT | Performed by: INTERNAL MEDICINE

## 2022-08-03 PROCEDURE — 97530 THERAPEUTIC ACTIVITIES: CPT

## 2022-08-03 RX ORDER — POLYETHYLENE GLYCOL 3350 17 G/17G
17 POWDER, FOR SOLUTION ORAL DAILY
Status: DISCONTINUED | OUTPATIENT
Start: 2022-08-03 | End: 2022-08-12 | Stop reason: HOSPADM

## 2022-08-03 RX ADMIN — DOCUSATE SODIUM 100 MG: 100 CAPSULE, LIQUID FILLED ORAL at 17:35

## 2022-08-03 RX ADMIN — POLYETHYLENE GLYCOL 3350 17 G: 17 POWDER, FOR SOLUTION ORAL at 17:35

## 2022-08-03 RX ADMIN — ALLOPURINOL 300 MG: 300 TABLET ORAL at 09:42

## 2022-08-03 RX ADMIN — CEFTRIAXONE 1000 MG: 1 INJECTION, SOLUTION INTRAVENOUS at 09:43

## 2022-08-03 RX ADMIN — DOCUSATE SODIUM 100 MG: 100 CAPSULE, LIQUID FILLED ORAL at 09:42

## 2022-08-03 RX ADMIN — PRAVASTATIN SODIUM 40 MG: 40 TABLET ORAL at 17:35

## 2022-08-03 RX ADMIN — TAMSULOSIN HYDROCHLORIDE 0.4 MG: 0.4 CAPSULE ORAL at 17:35

## 2022-08-03 RX ADMIN — METOPROLOL SUCCINATE 100 MG: 100 TABLET, EXTENDED RELEASE ORAL at 09:42

## 2022-08-03 RX ADMIN — FINASTERIDE 5 MG: 5 TABLET, FILM COATED ORAL at 09:42

## 2022-08-03 NOTE — OCCUPATIONAL THERAPY NOTE
Occupational Therapy Evaluation     Patient Name: Miguel Craven  XCYBW'T Date: 8/3/2022  Problem List  Principal Problem:    Acute blood loss anemia  Active Problems:    Generalized weakness    Gross hematuria    Metastasis of unknown origin ([address] )    Right shoulder pain    MICA (acute kidney injury) [facility])    Past Medical History  Past Medical History:   Diagnosis Date    Gout     Hypertension      Past Surgical History  Past Surgical History:   Procedure Laterality Date    IR BIOPSY BONE  8/2/2022    IR NEPHROSTOMY TUBE PLACEMENT  7/28/2022 08/03/22 1005   OT Last Visit   OT Visit Date 08/03/22   Note Type   Note type Evaluation   Additional Comments Pt received supine in bed on this date reporting no pain + is agreeable to OT session @ this time  Nsg staff verbally cleared pt for OT evaluation  @ exit, pt remains in bed c all lines intance, all needs met, call bell in hand + nsg aware of location/disposition  Restrictions/Precautions   Other Precautions Multiple lines; Fall Risk;Pain;Limb alert  (Maori speaking, nephrostomy tube, RUE deficits grossly (flexion impaired))   Pain Assessment   Pain Assessment Tool 0-10   Pain Score No Pain   Home Living   Type of Home House   Home Layout Multi level (3 floors) including walk out basement 0 [address]  Full bath available in basement; WIS no seat/GB  Home Equipment Cane   Prior Function   Level of [place] Needs assistance with ADLs and functional mobility   Lives With Daughter   Receives Help From Family   ADL Assistance Needs assistance; daughter assists   IADLs Needs assistance; daughter assists   Falls reported by daughter but not identified by pt  Lifestyle   Autonomy Pt lives c daughter in 2 story home + receives A for ADLs/IADLs c use of SPC for fxl transfers/mobility as able     Reciprocal Relationships Family   ADL   Eating Assistance 5  Supervision/Setup   Eating Deficit Setup   Grooming Assistance 5  Supervision/Setup Grooming Deficit Setup   UB Bathing Assistance 3  Moderate Assistance   UB Bathing Deficit Setup;Supervision/safety; Increased time to complete   LB Bathing Assistance 1  Total Assistance   LB Bathing Deficit Setup;Steadying;Supervision/safety; Increased time to complete;Right lower leg including foot; Left lower leg including foot   UB Dressing Assistance 3  Moderate Assistance   UB Dressing Deficit Setup; Increased time to complete  (RUE deficits)   LB Dressing Assistance 1  Total Assistance   LB Dressing Deficit Setup;Steadying; Increased time to complete; Thread RLE into pants; Thread LLE into pants; Thread RLE into underwear; Thread LLE into underwear   Toileting Assistance  1  Total Assistance   Toileting Deficit Setup;Steadying; Increased time to complete;Supervison/safety; Clothing management up;Clothing management down   Bed Mobility   Rolling R 2  Maximal assistance   Additional items Assist x 1;Bedrails; Increased time required   Rolling L 2  Maximal assistance   Additional items Assist x 1;HOB elevated; Bedrails   Supine to Sit 2  Maximal assistance   Additional items Assist x 1;HOB elevated; Bedrails; Increased time required;LE management   Sit to Supine 2  Maximal assistance   Additional items Assist x 1;HOB elevated; Increased time required;LE management;Verbal cues   Transfers   Sit to Stand 1  Dependent   Additional items Increased time required   Additional Comments Attempted STS from standard bed surface height + although various s/u, adaptations provided; pt unable to elevate to erect posture  Pt points to knees but therapist unable to specify if pt indicating pain or weakness d/t language barrier     Balance   Static Sitting Fair +   Dynamic Sitting Fair   Static Standing Poor -   Activity Tolerance   Activity Tolerance Patient limited by fatigue;Treatment limited secondary to medical complications (Comment)   Nurse Made Aware Medical staff made aware of current fxn, recommendations for d/c planning, fall risk + pt's location upon exit  Georgi Magaly)   RUE Assessment   RUE Assessment X   RUE Overall AROM   R Shoulder Flexion Impaired   R Shoulder Extension Impaired   R Shoulder ABduction Impaired   R Shoulder ADduction WFL   R Elbow Flexion WFL   R Elbow Extension WFL   R Elbow Supination WFL   R Elbow Pronation WFL   R Wrist Flexion WFL   R Wrist Extension WFL   R Wrist ABduction WFL   R Wrist ADduction WFL   R Mass Grasp WFL   LUE Assessment   LUE Assessment WFL   Hand Function   Gross Motor Coordination Functional   Fine Motor Coordination Functional   Sensation   Light Touch No apparent deficits   Vision-Basic Assessment   Current Vision Wears glasses only for reading   Visual History Other (Comment)  (No acute changes reported since hospitalization )   Cognition   Orientation Level Oriented X4; waxes / wanes, daughter reports poor insight to deficits (disorientation noted)   Assessment   Limitation Decreased ADL status; Decreased UE ROM; Decreased UE strength;Decreased endurance;Decreased self-care trans;Decreased high-level ADLs   Prognosis Fair   Assessment Pt is a 67 y o  male, admitted to [address] [place] [address] 7/27/2022 d/t acute blood loss anemia  Pt with PMHx impacting their performance during ADL tasks, including: generalized weakness, hematuria, metastasis of unknown origin, R shoulder pain, MICA, SOB, HTN  Prior to admission to the hospital Pt was performing ADLs with physical assistance  IADLs with physical assistance  Functional transfers/ambulation with physical assistance  Cognitive status was PTA was intact  OT order placed to assess Pt's ADLs, cognitive status, and performance during functional tasks in order to maximize safety and independence while making most appropriate d/c recommendations   Pt's clinical presentation is currently evolving given new onset deficits that effect Pt's occupational performance and ability [facility] including decrease activity tolerance, decrease standing balance, decrease sitting balance, decrease performance during ADL tasks, decrease BUE ROM, decrease generalized strength, decrease activity engagement, decrease performance during functional transfers, limited family support and high fall risk combined with medical complications of pain impacting overall mobility status, abnormal renal lab values, abnormal CBC and abnormal sodium values  Personal factors affecting Pt at time of initial evaluation include: multi-level environment, limited home support, inability to perform ADLs, new need for AD, inability to ambulate household distances, inability to navigate community distances and decreased initiation and engagement  Pt will benefit from continued skilled OT services to address deficits as defined above and to maximize level independence/participation during ADLs and functional tasks to facilitate return toward PLOF and improved quality of life  From an occupational therapy standpoint, recommendation at time of d/c would be PAR  Plan   Treatment Interventions ADL retraining;Functional transfer training;UE strengthening/ROM; Endurance training;Patient/family training; Compensatory technique education; Energy conservation; Activityengagement   Goal Expiration Date 08/13/22   OT Treatment Day 0   OT Frequency 3-5x/wk   Recommendation   OT Discharge Recommendation Post acute rehabilitation services   AM-PAC Daily Activity Inpatient   Lower Body Dressing 1   Bathing 2   Toileting 1   Upper Body Dressing 2   Grooming 3   Eating 3   Daily Activity Raw Score 12   Daily Activity Standardized Score (Calc for Raw Score >=11) 30 6   AM-PAC Applied Cognition Inpatient   Following a Speech/Presentation 3   Understanding Ordinary Conversation 3   Taking Medications 3   Remembering Where Things Are Placed or Put Away 3   Remembering List of 4-5 Errands 3   Taking Care of Complicated Tasks 3   Applied Cognition Raw Score 18   Applied Cognition Standardized Score 38 07       The patient's raw score on the AM-PAC Daily Activity inpatient short form is 12, standardized score is 30 6, less than 39 4  Patients at this level are likely to benefit from DC to post-acute rehabilitation services  Please refer to the recommendation of the Occupational Therapist for safe DC planning  GOALS:    *ADL transfers with (S) for inc'd independence with ADLs/purposeful tasks    *UB ADL with (S) for inc'd independence with self cares    *LB ADL with Min (A) using AE prn for inc'd independence with self cares    *Toileting with Min (A) for clothing management and hygiene for return to PLOF with personal care    *Increase stand tolerance x 1  m for inc'd tolerance with standing purposeful tasks    *Participate in 10m UE therex to increase overall stamina/activity tolerance for purposeful tasks    *Bed mobility- Min (A) for inc'd independence to manage own comfort and initiate EOB & OOB purposeful tasks    *Patient will verbalize 3 safety awareness/ principles to prevent falls in the home setting  *Patient will verbalize and demonstrate use of energy conservation/deep breathing techniques and work simplification skills during functional activities with no verbal cues  *Patient will increase OOB/sitting tolerance to 2-4 hours per day to increase participation in self-care and leisure tasks with no s/s of exertion  *Patient will engage in ongoing cognitive assessment to assist with safe discharge planning/recommendations  *Pt will verbalize and demonstrate understanding of post-op movement precautions 857% (if applicable) in tx sessions for increased safety and functional mobility        *Pt will demonstrate use of long handled AE (if appropriate) during 100% of tx sessions for increased ADL safety and independence following D/C     MARKY Otero/CHARLES

## 2022-08-03 NOTE — OCCUPATIONAL THERAPY NOTE
Occupational Therapy Treatment Note     Patient Name: Sarah Martin  BGSLQ'M Date: 8/3/2022  Problem List  Principal Problem:    Acute blood loss anemia  Active Problems:    Generalized weakness    Gross hematuria    Metastasis of unknown origin [facility])    Right shoulder pain    MICA (acute kidney injury) ([facility] [address] )          08/03/22 1139   OT Last Visit   OT Visit Date 08/03/22   Note Type   Note Type Treatment   Restrictions/Precautions   Weight Bearing Precautions Per Order No   Other Precautions Multiple lines; Fall Risk;Pain;Limb alert  (Indonesian speaking, nephrostomy tube, RUE limb alert)   Lifestyle   Autonomy Pt lives c daughter in 2 story home + receives A for ADLs/IADLs c use of SPC for fxl transfers/mobility as able  Reciprocal Relationships Family   Pain Assessment   Pain Assessment Tool 0-10   Pain Score 6   Pain Location/Orientation Location: Shoulder;Orientation: Right   Patient's Stated Pain Goal No pain   Hospital Pain Intervention(s) Rest   ADL   UB Dressing Assistance 3  Moderate Assistance   UB Dressing Deficit Setup;Supervision/safety; Thread RUE   UB Dressing Comments Dons hospital gown; RUE deficits impacting higher level of I    Bed Mobility   Supine to Sit 3  Moderate assistance   Additional items Assist x 1; Increased time required   Additional Comments Transitioned from bed-bedside recliner + remains @ exit c all needs met, call bell in reach + nsg aware of disposition  Transfers   Sit to Stand 3  Moderate assistance   Additional items Assist x 2; Increased time required   Stand to Sit 3  Moderate assistance   Additional items Assist x 1   Functional Mobility   Functional Mobility 4  Minimal assistance   Additional Comments Short distance fxl mobility performed from EOB facing doorway-bedside recliner @ window side  Completes c increased time required + demonstrates no overt LOB  RW utilized safety while navigating room     Cognition   Overall Cognitive Status [facility]   Plan   Treatment Interventions ADL retraining;Functional transfer training;UE strengthening/ROM; Endurance training; Compensatory technique education; Activityengagement; Energy conservation   Goal Expiration Date 08/13/22   OT Treatment Day 1   OT Frequency 3-5x/wk   Recommendation   OT Discharge Recommendation Post acute rehabilitation services   AM-[facility] Daily Activity Inpatient   Lower Body Dressing 1   Bathing 2   Toileting 1   Upper Body Dressing 2   Grooming 3   Eating 3   Daily Activity Raw Score 12   Daily Activity Standardized Score (Calc for Raw Score >=11) 30 6   AM-[facility] Applied Cognition Inpatient   Following a Speech/Presentation 3   Understanding Ordinary Conversation 3   Taking Medications 3   Remembering Where Things Are Placed or Put Away 3   Remembering List of 4-5 Errands 3   Taking Care of Complicated Tasks 3   Applied Cognition Raw Score 18   Applied Cognition Standardized Score 38 07      The patient's raw score on the AM-PAC Daily Activity inpatient short form is 12, standardized score is 30 6, less than 39 4  Patients at this level are likely to benefit from DC to post-acute rehabilitation services  Please refer to the recommendation of the Occupational Therapist for safe DC planning      Vandana NEGRO/CHARLES

## 2022-08-03 NOTE — CASE MANAGEMENT
Case Management Discharge Planning Note    Patient name Natalia Madrigal  Location /-07 MRN [id number]  : 1950 Date 8/3/2022       Current Admission Date: 2022  Current Admission Diagnosis:Acute blood loss anemia   Patient Active Problem List    Diagnosis Date Noted    Gross hematuria 2022    Metastasis of unknown origin ([facility] [address] ) 2022    Acute blood loss anemia 2022    Right shoulder pain 2022    [facility]    Generalized weakness 2020    Need for vaccination 2020    Cough 2020    Sinus congestion 2020    Medicare annual wellness visit, subsequent 2020    Encounter to establish care 06/10/2020    Shortness of breath 2020    Prolonged Q-T interval on ECG 2020    Acute respiratory failure with hypoxia ([facility] [address] ) secondary to presumed COVID-19 infection 2020    Suspected COVID-19 virus infection 2020    Hypertension 2020      LOS (days): 6  Geometric Mean LOS (GMLOS) (days): 3 50  Days to GMLOS:-2 5     OBJECTIVE:  Risk of Unplanned Readmission Score: 14 37      Current admission status: Inpatient   Preferred Pharmacy:   [place] [id number]  Phone: [phone] Fax: [phone]    Primary Care Provider: TRICIA Holly    Primary Insurance: MEDICARE MISC REPLACEMENT  Secondary Insurance:     DISCHARGE DETAILS:    Discharge planning discussed with[de-identified] patient's daughter via phone  Freedom of Choice: Yes  Comments - Freedom of Choice: Patient remains medically cleared for dc as per SLIM pending placement  PT/OT evaluated this morning and are recommending [facility] is the only facility that can clinically accept that is willing to submit for a one time contract with patient's insurance plan   CM attempted to meet with patient's daughter at bedside to relay same but she was not present  Patient did call his daughter for CM and she would like to accept the bed offer at [address] as long as they can get auth  She reports that she is also willing to apply for another [place] based insurance plan, if that will assist, as he will not be returning to [place] as he has no supports there  She's going to call patient's [address] again to ask them to send COVID vaccine info to this CM prior to his dc  CM contacted family/caregiver?: Yes  Were Treatment Team discharge recommendations reviewed with patient/caregiver?: Yes  Did patient/caregiver verbalize understanding of patient care needs?: N/A- going to facility  Were patient/caregiver advised of the risks associated with not following Treatment Team discharge recommendations?: Yes    Contacts  Patient Contacts: geraldine Greene  Relationship to Patient[de-identified] Family  Contact Method: Phone  Phone Number: [phone]  Reason/Outcome: Continuity of Care, Referral, Discharge [address]         Is the patient interested in [facility] at discharge?: No     Other Referral/Resources/Interventions Provided:  Interventions: Short Term Rehab  Referral Comments: [address] is submitting for single case agreement with patient's insurance company  Treatment Team Recommendation: Short Term Rehab  Discharge Destination Plan[de-identified] Short Term Rehab (OO submitting for single case agreement)  Transport at Discharge :  Other (Comment) (TBD)

## 2022-08-03 NOTE — ASSESSMENT & PLAN NOTE
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

## 2022-08-03 NOTE — PHYSICAL THERAPY NOTE
Physical Therapy Evaluation     Patient's Name: Taqueria Cox    Admitting Diagnosis  Anemia [D64 9]  Paralysis ([facility] [address] ) [G83 9]  Hematuria [R31 9]  Other hydronephrosis [N13 39]  Benign prostatic hyperplasia without lower urinary tract symptoms [N40 0]    Problem List  Patient Active Problem List   Diagnosis    Shortness of breath    Prolonged Q-T interval on ECG    Acute respiratory failure with hypoxia ([facility] [address] ) secondary to presumed COVID-19 infection    Suspected COVID-19 virus infection    Hypertension    Encounter to establish care    Medicare annual wellness visit, subsequent    Generalized weakness    Need for vaccination    Cough    Sinus congestion    Gross hematuria    Metastasis of unknown origin ([facility] [address] )    Acute blood loss anemia    Right shoulder pain    MICA (acute kidney injury) [facility])       Past Medical History  Past Medical History:   Diagnosis Date    Gout     Hypertension        Past Surgical History  Past Surgical History:   Procedure Laterality Date    IR BIOPSY BONE  8/2/2022    IR NEPHROSTOMY TUBE PLACEMENT  7/28/2022 08/03/22 1204   Note Type   Note type Evaluation   Pain Assessment   Pain Assessment Tool 0-10   Pain Score 6   Pain Location/Orientation Location: Shoulder;Orientation: Right   Restrictions/Precautions   Weight Bearing Precautions Per Order No   Other Precautions Multiple lines; Fall Risk;Pain;Limb alert  (Latvian speaking, nephrostomy tubes)   Home Living   Type of [address] [place] [address] Multi-level  (bed/bath available in basement, 0 khai)   Bathroom Shower/Tub Walk-in shower   Bathroom Toilet Standard   Home Equipment Cane   Additional Comments Pt  lives in an apartment in [facility] [place] generally  S/p this admit per his daughter he will be staying with her in  her home  The home set up indicated here is her home set up  Family have been noticing a decline in function, are open to rehab     Prior Function   Level of [place] Needs assistance with ADLs and functional mobility   Lives With Daughter   Receives Help From Family   ADL Assistance Needs assistance   IADLs Needs assistance   Falls in the last 6 months 0  (3 falls per daughter in last 10 years)   Cognition   Overall Cognitive Status WFL   Arousal/Participation Alert   Orientation Level Oriented to person;Oriented to place   Following Commands Follows one step commands without difficulty   Subjective   Subjective "I will call my daughter"   RLE Assessment   RLE Assessment X   Strength RLE   R Hip Flexion   (grossly 3-/5 hip flexion, >3 throughout otherwise)   LLE Assessment   LLE Assessment X   Strength LLE   LLE Overall Strength   (grossly 3-/5 hip flexion, >3 throughout otherwise)   Bed Mobility   Supine to Sit 3  Moderate assistance   Additional items Assist x 1; Increased time required;Verbal cues   Additional Comments s/p session pt  seated OOB in recliner, all needs in reach  RN notified no alarm on pt  Verbalized understanding  Transfers   Sit to Stand 3  Moderate assistance   Additional items Assist x 2; Increased time required;Verbal cues   Stand to Sit 3  Moderate assistance   Additional items Assist x 2; Increased time required;Verbal cues  (d/t uncontrolled descent)   Additional Comments Attempted STS with A x2 and RW use  Able to stand fully erect with no dizziness sx reported  Ensure no assist by therapist via R UE for mobility assist due to R shoulder pain  Pt  able to ambulate with Min A x1  Education to stand within [address] [place] [address] for mobility  Verbalized understanding  Reinforcement required  Moderate SOB with activity, SPO2 wfl  Ambulation/Elevation   Gait pattern Decreased foot clearance; Short stride; Inconsistent nigel;Knees flexed; Excessively slow   Gait Assistance 4  Minimal assist   Additional items Assist x 1   Assistive Device Rolling walker   Distance 10'   Stair Management Assistance Not tested   Balance   Static Sitting Fair +   Dynamic Sitting Fair   Static Standing Poor -   Dynamic Standing Poor   Ambulatory Poor +   Endurance Deficit   Endurance Deficit Yes   Endurance Deficit Description fatigue, pain   Activity Tolerance   Activity Tolerance Patient limited by pain; Patient limited by fatigue   Nurse Made Aware RN ok to see [facility]   Assessment   Prognosis Good   Problem List Decreased strength;Decreased endurance; Impaired balance;Decreased mobility; Decreased cognition;Pain   Assessment Pt is 67 y o  male seen for PT evaluation s/p admit to [facility] on 7/27/2022 w/ Acute blood loss anemia  PT consulted to assess pt's functional mobility and d/c needs  Order placed for PT eval and tx, w/ activity order  Comorbidities affecting pt's physical performance at time of assessment include:generalized weakness, gross hematuria, bone metastasis, cute blood loss anemia, R shoulder pain with suspected bone mets to scapula, GAYATHRI  PTA, pt was independent w/ all functional mobility w/ Cane use; within last month he has been requiring increased assist per family with mobility and self care    Personal factors affecting pt at time of IE include: lives in one floor of a multi story house, ambulating w/ assistive device, limited home support, positive fall history, inability to perform IADLs and inability to perform ADLs  Please find objective findings from PT assessment regarding body systems outlined above with impairments and limitations including weakness, impaired balance, decreased endurance, gait deviations, pain, decreased functional mobility tolerance, fall risk and SOB upon exertion  The following objective measures performed on IE also reveal limitations: AM-PAC 6-Clicks: 27/34  Pt's clinical presentation is currently unstable/unpredictable seen in pt's presentation  Pt to benefit from continued PT tx to address deficits as defined above and maximize level of functional independent mobility and consistency   From PT/mobility standpoint, recommendation at time of d/c would be post acute rehabilitation services pending progress in order to facilitate return to PLOF  Barriers to Discharge Decreased caregiver support  (decline in functional baseline)   Goals   Patient Goals none stated   STG Expiration Date 08/15/22   Short Term Goal #1 1  Pt will complete bed mobility with Supervised x1 to increase functional mobility  2  Pt will complete sit to stand transfers with supervised A x1 to increase functional mobility  3  Pt will ambulate 75ft with RW with supervision without LOB 4  Pt will increase B/L LE strength by 1 grade to facilitate improved functional mobility with decreased risk of falls  5  Pt will increase standing balance to fair in order to decrease risk of falls  PT Treatment Day 0   Plan   Treatment/Interventions Functional transfer training;LE strengthening/ROM; Therapeutic exercise; Endurance training;Bed mobility;Gait training;Equipment eval/education;Patient/family training;Spoke to nursing;OT   PT Frequency 4-6x/wk   Recommendation   PT Discharge Recommendation Post acute rehabilitation services   AM-PAC Basic Mobility Inpatient   Turning in Bed Without Bedrails 2   Lying on Back to Sitting on Edge of Flat Bed 2   Moving Bed to Chair 2   Standing Up From Chair 2   Walk in Room 3   Climb 3-5 Stairs 1   Basic Mobility Inpatient Raw Score 12   Basic Mobility Standardized Score 32 23   Highest Level Of Mobility   -[facility] Goal 4: Move to chair/commode       Ina Spring, PT

## 2022-08-03 NOTE — PLAN OF CARE
Problem: OCCUPATIONAL THERAPY ADULT  Goal: Performs self-care activities at highest level of function for planned discharge setting  See evaluation for individualized goals  Description: Treatment Interventions: ADL retraining, Functional transfer training, UE strengthening/ROM, Endurance training, Patient/family training, Compensatory technique education, Energy conservation, Activityengagement          See flowsheet documentation for full assessment, interventions and recommendations  Outcome: Progressing  Note: Limitation: Decreased ADL status, Decreased UE ROM, Decreased UE strength, Decreased endurance, Decreased self-care trans, Decreased high-level ADLs  Prognosis: Fair  Assessment: Pt is a 67 y o  male, admitted to [address] [place] [address] 7/27/2022 d/t acute blood loss anemia  Pt with PMHx impacting their performance during ADL tasks, including: generalized weakness, hematuria, metastasis of unknown origin, R shoulder pain, MICA, SOB, HTN  Prior to admission to the hospital Pt was performing ADLs with physical assistance  IADLs with physical assistance  Functional transfers/ambulation with physical assistance  Cognitive status was PTA was intact  OT order placed to assess Pt's ADLs, cognitive status, and performance during functional tasks in order to maximize safety and independence while making most appropriate d/c recommendations   Pt's clinical presentation is currently evolving given new onset deficits that effect Pt's occupational performance and ability to safely return to OF including decrease activity tolerance, decrease standing balance, decrease sitting balance, decrease performance during ADL tasks, decrease BUE ROM, decrease generalized strength, decrease activity engagement, decrease performance during functional transfers, limited family support and high fall risk combined with medical complications of pain impacting overall mobility status, abnormal renal lab values, abnormal CBC and abnormal sodium values  Personal factors affecting Pt at time of initial evaluation include: multi-level environment, limited home support, inability to perform ADLs, new need for AD, inability to ambulate household distances, inability to navigate community distances and decreased initiation and engagement  Pt will benefit from continued skilled OT services to address deficits as defined above and to maximize level independence/participation during ADLs and functional tasks to facilitate return toward PLOF and improved quality of life  From an occupational therapy standpoint, recommendation at time of d/c would be PAR       OT Discharge Recommendation: Post acute rehabilitation services

## 2022-08-03 NOTE — PROGRESS NOTES
Medical Oncology/Hematology Progress Note  Iggy Adames, male, 67 y o , 1950,  /-[birth year], [id number]     Reason for admission: Acute blood loss anemia, MIAC, bone metastasis  Reason for consultation: bone metastasis    ASSESSMENT AND PLAN:     1  Bone, Lung metastasis - likely primary prostate   7/28: CT abd/pelvis showed below finding:  o Asymmetrically enlarged prostate gland; left side is larger     Bladder wall thickening likely hypertrophy however may represent cystitis in the appropriate clinical setting  Posterior aspect is invaded by the prostate gland  Bilateral hydronephrosis likely from obstruction at the UVJ secondary to enlarged/invading prostate  No renal stones  o 1 cm nodule in the right middle lobe not seen on the prior exam from 6/22/2020 concerning for metastasis  Trace bilateral effusions  o Diffuse sclerosis throughout the osseous pelvis and in the L3, L2, T12, T11, T10, and T8 vertebral bodies is concerning for metastasis  o There appears to be decreased signal adenopathy measuring 1 6 cm in the short axis (2:61)  Shotty periaortic adenopathy is noted     · 7/28: PSA was 135 7  7/27: CMP showed BUN 31, Cr 1 50, Alk phos 1,044, gianna calcium 9 3   Patient seen by urology during this hospitalization  S/P bilateral percutaneous nephrostomies   8/1: CT of chest evidence of metastatic disease in the chest including multiple pulmonary nodules, mediastinal lymphadenopathy, multiple osseous lesions  Bilateral pleural effusions left larger than right with bibasilar atelectasis including complete collapse of the left lower lobe   Status post IR bone biopsy  Pathology pending   Plan/Recommendations:  o Patient is Vietnamese speaking  There were no available in-person translators for visit  A blue phone for translation was obtained   Translation was provided from [facility] named Omid ID number [id number]    o I discussed with patient in detail results of CT of chest which found lung metastasis  These are likely from same primary as bone lesions  Likely metastatic prostate cancer  He is status post bone biopsy which has pending results  We will follow these results  o Can consider palliative care consultation as patient has intermittent bone pain  o Per urology, will await to start casodex for now  Recommend initiating casodex as this is likely metastatic prostate cancer  However, can await bone biopsy results as well    o Message hope line already for office visit  Currently, next available visit is 8/31  However, our office will work to obtain an appointment sooner than this   o I did attempt to call patient's daughter regarding the above plan  However, I called her several times without answer  I will call and update her next time I am on call which will be Friday  If she has questions tomorrow, colleague Genoveva Brown PA-C is on call  2  Acute blood loss anemia   3  Thrombocytopenia   · 8/1: WBC 9 52, Hgb 7 7, MCV 93, PLT 134K, diff normal   · Patient presented with gross hematuria   · Haptoglobin 360, hemolysis smear with helmet cells, schistocytes noted  Ferritin 239, iron 25, TIBC 292, iron saturation 9%  Folate normal   Vitamin B12 652  Kappa free light chain 34, lambda free light chain 27  Kappa/lambda ratio 1 25   · 8/3:  WBC 10, hemoglobin 7 5, MCV 91, platelets 733 K, absolute monocytes 1 42, other diff unrevealing  BMP showing normal kidney functions  Last CMP 7/27 normal bilirubin  · Plan/Recommendations:  · Etiology of anemia is still secondary to acute blood loss from hematuria  However, will order DIC panel, hepatic function, LDH in setting of hemolysis smear showing schistocytes, helmet cells  Unlikely hemolysis since haptoglobin is normal, last bilirubin normal, hemoglobin and platelets are generally stable  SPEP still pending  Will follow  · Continue management per urology     · Can consider Venofer 200mg EOD if continues to require pRBC consistently  · Transfuse if Hgb <7g/dL or symptomatic anemia     Patient understands and is in agreement with this plan  Thank you for the opportunity to participate in this patient's care  Interval History: Patient seen at bedside  Daughter is not present  He states he is feeling significantly better since initial presentation  He does have intermittent bone pain at times  He is understandably upset after finding out that he has lung metastasis  He offers no other specific complaints to me during this visit  ECO/2    History of present illness: This is a 67year old [address] speaking male with a PMH of HTN, BPH who presented on  with weakness in upper and lower extremities  He also was unable to walk for 2 days  He had these symptoms ongoing for 1 month  He was following a urologist in [place] for hx of BPH  Daughter brought patient to [place] for second opinion  He was supposed to undergo a urological procedure  S/P prostate biopsy approx 9 months ago which was negative  In ED, he was found to have Hgb 6 6g/dL  Had gross hematuria as well as presentation   CT abd,pelvis:  IMPRESSION:     - Asymmetrically enlarged prostate gland; left side is larger     Bladder wall thickening likely hypertrophy however may represent cystitis in the appropriate clinical setting  Posterior aspect is invaded by the prostate gland  Bilateral hydronephrosis   likely from obstruction at the UVJ secondary to enlarged/invading prostate  No renal stones      - 1 cm nodule in the right middle lobe not seen on the prior exam from 2020 concerning for metastasis  Trace bilateral effusions      - Diffuse sclerosis throughout the osseous pelvis and in the L3, L2, T12, T11, T10, and T8 vertebral bodies is concerning for metastasis      - There appears to be decreased signal adenopathy measuring 1 6 cm in the short axis (2:61)  Shotty periaortic adenopathy is noted       : PSA was 135 7  : CMP showed BUN 31, Cr 1 50, Alk phos 1,044, gianna calcium 9 3  Labs today 8/1/22: Hgb 7 7, MCV 93, PLT 134K, diff normal      Urology saw patient, S/P IR placement of bilateral percutaneous nephrostomies  Review of Systems:   Review of Systems   Constitutional: Positive for activity change, appetite change, fatigue and unexpected weight change (16-20lbs loss in last few months )  Negative for chills, diaphoresis and fever  HENT: Negative for nosebleeds  Respiratory: Negative for apnea, cough, chest tightness and shortness of breath  Cardiovascular: Negative for chest pain, palpitations and leg swelling  Gastrointestinal: Positive for constipation  Negative for abdominal distention, abdominal pain, anal bleeding, blood in stool, diarrhea, nausea and vomiting  Endocrine: Negative for cold intolerance  Genitourinary: Negative for hematuria (Improvement seen)  Musculoskeletal: Positive for gait problem  Negative for arthralgias and back pain  Skin: Negative for color change, pallor, rash and wound  Neurological: Negative for dizziness, weakness, light-headedness and headaches  Hematological: Negative for adenopathy  Does not bruise/bleed easily  PHYSICAL EXAM:    /59   Pulse 75   Temp 98 6 °F (37 °C)   Resp 20   Ht 5' 10" (1 778 m)   Wt 97 2 kg (214 lb 4 6 oz)   SpO2 97%   BMI 30 75 kg/m²     Physical Exam  Constitutional:       General: He is not in acute distress  Appearance: Normal appearance  He is normal weight  He is not ill-appearing, toxic-appearing or diaphoretic  HENT:      Head: Normocephalic and atraumatic  Eyes:      General: No scleral icterus  Extraocular Movements: Extraocular movements intact  Conjunctiva/sclera: Conjunctivae normal    Cardiovascular:      Rate and Rhythm: Normal rate  Pulmonary:      Effort: Pulmonary effort is normal  No respiratory distress  Abdominal:      General: Bowel sounds are normal       Tenderness:  There is no abdominal tenderness  Comments: Bilateral nephrostomy tubes present with hematuria seen  Musculoskeletal:      Cervical back: Normal range of motion and neck supple  Right lower leg: No edema  Left lower leg: No edema  Lymphadenopathy:      Cervical: No cervical adenopathy  Skin:     General: Skin is warm and dry  Coloration: Skin is not jaundiced or pale  Findings: No bruising, erythema, lesion or rash  Neurological:      General: No focal deficit present  Mental Status: He is alert and oriented to person, place, and time  Mental status is at baseline  Motor: No weakness  Psychiatric:         Mood and Affect: Mood normal          Behavior: Behavior normal          Thought Content:  Thought content normal          Judgment: Judgment normal          LABS:     Recent Results (from the past 48 hour(s))   Basic metabolic panel    Collection Time: 08/02/22  5:07 AM   Result Value Ref Range    Sodium 133 (L) 135 - 147 mmol/L    Potassium 4 9 3 5 - 5 3 mmol/L    Chloride 101 96 - 108 mmol/L    CO2 23 21 - 32 mmol/L    ANION GAP 9 4 - 13 mmol/L    BUN 15 5 - 25 mg/dL    Creatinine 0 98 0 60 - 1 30 mg/dL    Glucose 87 65 - 140 mg/dL    Calcium 8 8 8 3 - 10 1 mg/dL    eGFR 76 ml/min/1 73sq m   CBC and differential    Collection Time: 08/02/22  5:07 AM   Result Value Ref Range    WBC 9 94 4 31 - 10 16 Thousand/uL    RBC 2 65 (L) 3 88 - 5 62 Million/uL    Hemoglobin 8 0 (L) 12 0 - 17 0 g/dL    Hematocrit 24 6 (L) 36 5 - 49 3 %    MCV 93 82 - 98 fL    MCH 30 2 26 8 - 34 3 pg    MCHC 32 5 31 4 - 37 4 g/dL    RDW 15 8 (H) 11 6 - 15 1 %    MPV 12 3 8 9 - 12 7 fL    Platelets 388 (L) 802 - 390 Thousands/uL    nRBC 1 /100 WBCs    Neutrophils Relative 62 43 - 75 %    Immat GRANS % 1 0 - 2 %    Lymphocytes Relative 23 14 - 44 %    Monocytes Relative 11 4 - 12 %    Eosinophils Relative 3 0 - 6 %    Basophils Relative 0 0 - 1 %    Neutrophils Absolute 6 19 1 85 - 7 62 Thousands/µL    Immature Grans Absolute 0 07 0 00 - 0 20 Thousand/uL    Lymphocytes Absolute 2 28 0 60 - 4 47 Thousands/µL    Monocytes Absolute 1 04 0 17 - 1 22 Thousand/µL    Eosinophils Absolute 0 33 0 00 - 0 61 Thousand/µL    Basophils Absolute 0 03 0 00 - 0 10 Thousands/µL   Immunoglobulin free LT chains blood    Collection Time: 08/02/22  5:07 AM   Result Value Ref Range    Ig Kappa Free Light Chain 34 0 (H) 3 3 - 19 4 mg/L    Ig Lambda Free Light Chain 27 1 (H) 5 7 - 26 3 mg/L    Kappa/Lambda FluidC Ratio 1 25 0 26 - 1 65   Vitamin B12    Collection Time: 08/02/22  5:07 AM   Result Value Ref Range    Vitamin B-12 652 100 - 900 pg/mL   Folate    Collection Time: 08/02/22  5:07 AM   Result Value Ref Range    Folate 8 3 3 1 - 17 5 ng/mL   Haptoglobin    Collection Time: 08/02/22  5:07 AM   Result Value Ref Range    Haptoglobin 360 (H) 34 - 355 mg/dL   Iron Saturation %    Collection Time: 08/02/22  5:07 AM   Result Value Ref Range    Iron Saturation 9 (L) 20 - 50 %    TIBC 292 250 - 450 ug/dL    Iron 25 (L) 65 - 175 ug/dL   Ferritin    Collection Time: 08/02/22  5:07 AM   Result Value Ref Range    Ferritin 239 8 - 388 ng/mL   Hemolysis Smear    Collection Time: 08/02/22  5:09 AM   Result Value Ref Range    Hemolysis Smear Helmet Cells noted  Schistocytes noted    Basic metabolic panel    Collection Time: 08/03/22  5:31 AM   Result Value Ref Range    Sodium 133 (L) 135 - 147 mmol/L    Potassium 4 8 3 5 - 5 3 mmol/L    Chloride 101 96 - 108 mmol/L    CO2 23 21 - 32 mmol/L    ANION GAP 9 4 - 13 mmol/L    BUN 17 5 - 25 mg/dL    Creatinine 0 99 0 60 - 1 30 mg/dL    Glucose 94 65 - 140 mg/dL    Calcium 8 2 (L) 8 3 - 10 1 mg/dL    eGFR 75 ml/min/1 73sq m   CBC and differential    Collection Time: 08/03/22  5:31 AM   Result Value Ref Range    WBC 10 04 4 31 - 10 16 Thousand/uL    RBC 2 49 (L) 3 88 - 5 62 Million/uL    Hemoglobin 7 5 (L) 12 0 - 17 0 g/dL    Hematocrit 22 7 (L) 36 5 - 49 3 %    MCV 91 82 - 98 fL    MCH 30 1 26 8 - 34 3 pg    MCHC 33 0 31 4 - 37 4 g/dL    RDW 15 3 (H) 11 6 - 15 1 %    MPV 12 3 8 9 - 12 7 fL    Platelets 400 (L) 595 - 390 Thousands/uL    nRBC 0 /100 WBCs    Neutrophils Relative 66 43 - 75 %    Immat GRANS % 1 0 - 2 %    Lymphocytes Relative 17 14 - 44 %    Monocytes Relative 14 (H) 4 - 12 %    Eosinophils Relative 2 0 - 6 %    Basophils Relative 0 0 - 1 %    Neutrophils Absolute 6 62 1 85 - 7 62 Thousands/µL    Immature Grans Absolute 0 07 0 00 - 0 20 Thousand/uL    Lymphocytes Absolute 1 72 0 60 - 4 47 Thousands/µL    Monocytes Absolute 1 42 (H) 0 17 - 1 22 Thousand/µL    Eosinophils Absolute 0 19 0 00 - 0 61 Thousand/µL    Basophils Absolute 0 02 0 00 - 0 10 Thousands/µL       CT abdomen pelvis wo contrast    Result Date: 7/28/2022  Narrative: CT ABDOMEN AND PELVIS WITHOUT IV CONTRAST INDICATION:   looking for metastatic prostate cancer  COMPARISON:  None  TECHNIQUE:  CT examination of the abdomen and pelvis was performed without intravenous contrast  Axial, sagittal, and coronal 2D reformatted images were created from the source data and submitted for interpretation  Radiation dose length product (DLP) for this visit:  687 mGy-cm   This examination, like all CT scans performed in the [facility], was performed utilizing techniques to minimize radiation dose exposure, including the use of iterative reconstruction and automated exposure control  Enteric contrast was not administered  FINDINGS: Lack of IV and oral contrast limits evaluation  ABDOMEN LOWER CHEST: 1 cm nodule in the right middle lobe not seen on the prior exam from 6/22/2020 concerning for metastasis  Trace bilateral effusions  LIVER/BILIARY TREE:  Unremarkable  GALLBLADDER:  No calcified gallstones  No pericholecystic inflammatory change  SPLEEN:  Unremarkable  PANCREAS:  Unremarkable  ADRENAL GLANDS:  Unremarkable  KIDNEYS/URETERS:  Unremarkable  Bilateral hydronephrosis   STOMACH AND BOWEL:  There is colonic diverticulosis without evidence of acute diverticulitis  APPENDIX:  A normal appendix was visualized  ABDOMINOPELVIC CAVITY:  No ascites  No pneumoperitoneum  There appears to be decreased signal adenopathy measuring 1 6 cm in the short axis (2:61)  Shotty periaortic adenopathy is noted    VESSELS:  Unremarkable for patient's age  PELVIS REPRODUCTIVE ORGANS:  Asymmetrically enlarged prostate gland; left side is larger  Patito Skipper URINARY BLADDER:  Bladder wall thickening likely hypertrophy however may represent cystitis in the appropriate clinical setting  Posterior aspect is invaded by the prostate gland    ABDOMINAL WALL/INGUINAL REGIONS:  Unremarkable  OSSEOUS STRUCTURES: Diffuse sclerosis throughout the osseous pelvis and in the L3, L2, T12, T11, T10, and T8 vertebral bodies is concerning for metastasis  No acute fracture  Impression: Asymmetrically enlarged prostate gland; left side is larger     Bladder wall thickening likely hypertrophy however may represent cystitis in the appropriate clinical setting  Posterior aspect is invaded by the prostate gland  Bilateral hydronephrosis likely from obstruction at the UVJ secondary to enlarged/invading prostate  No renal stones  1 cm nodule in the right middle lobe not seen on the prior exam from 6/22/2020 concerning for metastasis  Trace bilateral effusions  Diffuse sclerosis throughout the osseous pelvis and in the L3, L2, T12, T11, T10, and T8 vertebral bodies is concerning for metastasis  There appears to be decreased signal adenopathy measuring 1 6 cm in the short axis (2:61)  Shotty periaortic adenopathy is noted    Workstation performed: [id number]     XR chest 1 view portable    Result Date: 7/28/2022  Narrative: CHEST INDICATION:  Weakness  COMPARISON:  6/19/2020, CT chest 6/22/2020 EXAM PERFORMED/VIEWS:  XR CHEST PORTABLE FINDINGS: Cardiomediastinal silhouette appears unremarkable  The lungs are grossly clear, noting limited evaluation of the left retrocardiac region  No pneumothorax   Question mottled density of the right scapula  Impression: Limited chest with no acute cardiopulmonary disease  Question mottled density of the right scapula  Workstation performed: [id number]     XR shoulder 2+ vw right    Result Date: 7/28/2022  Narrative: RIGHT SHOULDER INDICATION:   right shoulder pain  COMPARISON:  None VIEWS:  XR SHOULDER 2+ VW RIGHT Images: 3 FINDINGS: Mottled sclerotic changes throughout the scapula with irregularity of the glenoid articular surface  Mild degenerative changes at the glenohumeral joint and acromioclavicular joint with some mild undersurface bony spurring  Mild sclerotic changes in several ribs Mild soft tissue prominence right upper arm  Visualized right lung is clear  Impression: Mottled sclerotic changes in the scapula, concerning for bony metastatic disease Workstation performed: [id number]     IR nephrostomy tube placement    Result Date: 7/29/2022  Narrative: Antegrade pyelogram and bilateral nephrostomy tube placement Clinical History:  Hematuria, anemia, enlarged prostate with bladder outlet obstruction and bilateral hydronephrosis Contrast:  20 mL of iohexol (OMNIPAQUE) Fluoro time: 4MIN 12SEC Radiation dose:  112 mGy Number of Images:  4 Conscious sedation time:  General Anesthesia The patient was placed prone on the table and the bilateral flanks were prepped and draped in the usual sterile fashion  After local anesthesia was administered to the skin, an 18 gauge needle was advanced under direct ultrasound guidance into a posterior lower pole calyx of the left kidney  A 0 018 wire was advanced through the needle, and the needle was removed  The Accustick coaxial dilator was inserted over the wire, and the internal portions including the wire were removed  Contrast was injected through the outer dilator, an antegrade pyelogram was performed  Intervention: The outer dilator was removed over a heavy-duty wire   After tract dilatation, a 10 N[place] nephrostomy tube was advanced over the wire until the loop was formed within the renal pelvis  The catheter was then locked in place  The catheter was  sutured at the skin  Contrast was injected, confirming satisfactory location of the tube  The tube was then connected to gravity bag, and dressed sterilely  This same process was performed of the right kidney with placement of a right 10 Venezuelan nephrostomy tube  Impression: Impression: Successful placement of bilateral 10 Venezuelan nephrostomy tubes into bilateral moderately hydronephrotic renal collecting systems  Plan: Tubes to bag drainage  Flush twice a day in the hospital and q d  at home with 10 cc normal saline solution and return in 2 months for routine catheter changes  These tubes will be left in place until the patient's enlarged prostate is treated  Workstation performed: [id number]     US kidney and bladder    Result Date: 7/31/2022  Narrative: RENAL ULTRASOUND INDICATION:   omar  Abnormal CT scan which demonstrated bilateral hydronephrosis  Status post bilateral nephrostomy tubes placed 3 days ago  COMPARISON: 7/28/2022 TECHNIQUE:   Ultrasound of the retroperitoneum was performed with a curvilinear transducer utilizing volumetric sweeps and still imaging techniques  FINDINGS: KIDNEYS: Symmetric and normal size  Right kidney:  10 8 x 5 6 x 4 7 cm  Volume 146 9 mL Left kidney:  10 5 x 3 3 x 3 4 cm  Volume 61 8 mL Right kidney Normal echogenicity and contour  No mass is identified  No hydronephrosis  No shadowing calculi  No perinephric fluid collections  Left kidney Normal echogenicity and contour  No mass is identified  No hydronephrosis  No shadowing calculi  No perinephric fluid collections  URETERS: Nonvisualized  BLADDER: Only mild to moderate bladder distention  No focal thickening or mass lesions  Bilateral ureteral jets detected  Impression: No hydronephrosis   Workstation performed: [id number]         HISTORY:    Past Medical History:   Diagnosis Date    Gout     Hypertension        Past Surgical History:   Procedure Laterality Date    IR BIOPSY BONE  8/2/2022    IR NEPHROSTOMY TUBE PLACEMENT  7/28/2022       Family History   Problem Relation Age of Onset    Diabetes Mother     Hypertension Mother     Mental illness Mother     Heart attack Son     Heart attack Daughter        Social History     Socioeconomic History    Marital status: Single     Spouse name: None    Number of children: None    Years of education: None    Highest education level: None   Occupational History    None   Tobacco Use    Smoking status: Former Smoker     Types: Cigarettes    Smokeless tobacco: Never Used    Tobacco comment: quit 24yrs ago   Vaping Use    Vaping Use: Never used   Substance and Sexual Activity    Alcohol use:  Yes    Drug use: Never    Sexual activity: None   Other Topics Concern    None   Social History Narrative    None     Social Determinants of Health     Financial Resource Strain: Not on file   Food Insecurity: Not on file   Transportation Needs: Not on file   Physical Activity: Not on file   Stress: Not on file   Social Connections: Not on file   Intimate Partner Violence: Not on file   Housing Stability: [address] Unable to Pay for Housing in the Last Year: No    Number of [place] in the Last Year: 2    Unstable Housing in the Last Year: No         Current Facility-Administered Medications:     acetaminophen (TYLENOL) tablet 650 mg, 650 mg, Oral, Q6H PRN, [employer], PA-C, 650 mg at 07/29/22 1151    allopurinol (ZYLOPRIM) tablet 300 mg, 300 mg, Oral, Daily, [employer], PA-C, 300 mg at 08/03/22 3178    cefTRIAXone (ROCEPHIN) IVPB (premix in dextrose) 1,000 mg 50 mL, 1,000 mg, Intravenous, Q24H, Ion Luevano MD, Last Rate: 100 mL/hr at 08/03/22 0943, 1,000 mg at 08/03/22 0943    docusate sodium (COLACE) capsule 100 mg, 100 mg, Oral, BID, Terra Herestephania, PA-C, 100 mg at 08/03/22 0799    finasteride (PROSCAR) tablet 5 mg, 5 mg, Oral, Daily, Adela Coe PA-C, 5 mg at 08/03/22 9860    metoprolol succinate (TOPROL-XL) 24 hr tablet 100 mg, 100 mg, Oral, Daily, Adela Coe PA-C, 100 mg at 08/03/22 2469    oxyCODONE (ROXICODONE) IR tablet 5 mg, 5 mg, Oral, Q6H PRN, Panchito Becerra PA-C, 5 mg at 08/02/22 2204    pravastatin (PRAVACHOL) tablet 40 mg, 40 mg, Oral, Daily With Dinner, Adela Coe PA-C, 40 mg at 08/02/22 1630    tamsulosin (FLOMAX) capsule 0 4 mg, 0 4 mg, Oral, Daily With Dinner, TRICIA Seay, 0 4 mg at 08/02/22 1630    Medications Prior to Admission   Medication    allopurinol (ZYLOPRIM) 100 mg tablet    allopurinol (ZYLOPRIM) 300 mg tablet    aspirin 81 mg chewable tablet    fluticasone (FLONASE) 50 mcg/act nasal spray    metoprolol succinate (TOPROL-XL) 50 mg 24 hr tablet    tamsulosin (FLOMAX) 0 4 mg    brompheniramine-pseudoephedrine-DM 30-2-10 MG/5ML syrup    lisinopril (ZESTRIL) 10 mg tablet    methylPREDNISolone 4 MG tablet therapy pack    multivitamin (THERAGRAN) TABS    oxybutynin (DITROPAN) 5 mg tablet    pantoprazole (PROTONIX) 40 mg tablet    promethazine-codeine (PHENERGAN WITH CODEINE) 6 25-10 mg/5 mL syrup    simvastatin (ZOCOR) 10 mg tablet       No Known Allergies    Labs and pertinent reports reviewed  This note has been generated by voice recognition software system  Therefore, there may be spelling, grammar, and or syntax errors  Please contact if questions arise

## 2022-08-03 NOTE — PLAN OF CARE
Problem: PHYSICAL THERAPY ADULT  Goal: Performs mobility at highest level of function for planned discharge setting  See evaluation for individualized goals  Description: Treatment/Interventions: Functional transfer training, LE strengthening/ROM, Therapeutic exercise, Endurance training, Bed mobility, Gait training, Equipment eval/education, Patient/family training, Spoke to nursing, OT          See flowsheet documentation for full assessment, interventions and recommendations  Outcome: Progressing  Note: Prognosis: Good  Problem List: Decreased strength, Decreased endurance, Impaired balance, Decreased mobility, Decreased cognition, Pain  Assessment: Pt is 67 y o  male seen for PT evaluation s/p admit to [facility] on 7/27/2022 w/ Acute blood loss anemia  PT consulted to assess pt's functional mobility and d/c needs  Order placed for PT eval and tx, w/ activity order  Comorbidities affecting pt's physical performance at time of assessment include:generalized weakness, gross hematuria, bone metastasis, cute blood loss anemia, R shoulder pain with suspected bone mets to scapula, GAYATHRI  PTA, pt was independent w/ all functional mobility w/ Cane use; within last month he has been requiring increased assist per family with mobility and self care    Personal factors affecting pt at time of IE include: lives in one floor of a multi story house, ambulating w/ assistive device, limited home support, positive fall history, inability to perform IADLs and inability to perform ADLs  Please find objective findings from PT assessment regarding body systems outlined above with impairments and limitations including weakness, impaired balance, decreased endurance, gait deviations, pain, decreased functional mobility tolerance, fall risk and SOB upon exertion  The following objective measures performed on IE also reveal limitations: AM-PAC 6-Clicks: 89/26   Pt's clinical presentation is currently unstable/unpredictable seen in pt's presentation  Pt to benefit from continued PT tx to address deficits as defined above and maximize level of functional independent mobility and consistency  From PT/mobility standpoint, recommendation at time of d/c would be post acute rehabilitation services pending progress in order to facilitate return to PLOF  Barriers to Discharge: Decreased caregiver support (decline in functional baseline)     PT Discharge Recommendation: Post acute rehabilitation services    See flowsheet documentation for full assessment

## 2022-08-03 NOTE — PROGRESS NOTES
[facility]  Progress Note - Becki Botello 1950, 67 y o  male MRN: [id number]  Unit/Bed#: -[facility] Encounter: [id number]  Primary Care Provider: TRICIA Marinelli   Date and time admitted to hospital: 7/27/2022 11:40 PM    * Acute blood loss anemia  Assessment & Plan  Hgb 6 6 on admission  Last hgb 13 on labs from 10/2021  Has assoc fatigue   Likely 2/2 hematuria   Hematuria since resolved  Status post 3 units packed red blood cells  Has since stabilized at approximately 8    MICA (acute kidney injury) ([facility] [address] )  Assessment & Plan  · Secondary to by hydronephrosis  · Presented creatinine 1 5  · Resolved with ivf    Right shoulder pain  Assessment & Plan  Limited mobility due to pain  Worse with shoulder extension  XR R shoulder   Tylenol prn it    Metastasis of unknown origin [facility])  Assessment & Plan  CT a/p 07/27: revealed 1 cm nodule in the right middle lobe not seen on the prior exam from 6/22/2020 concerning for metastasis and diffuse sclerosis throughout the osseous pelvis and in the L3, L2, T12, T11, T10, and T8 vertebral bodies also concerning for metastasis   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 as well  Prostate likely source   Oncology consult and plan for IR guided biopsy today  Will have outpatient follow-up with Oncology    Gross hematuria  Assessment & Plan  Patient presents with gross hematuria x 1m  Had had prostate biopsy in past, last bx 10/2021, cytology negative  Follows with a urology in [address] [place] [address]    CT a/p 07/27: asymmetrically enlarged prostate gland; left side is larger  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  Bilateral hydronephrosis likely from obstruction at the UVJ secondary to enlarged/invading prostate  No renal stones  S/p B/l PCN by IR  Hold aspirin/nsaid   Urinary retention protocol  S/p 3 units prbc     Hemoglobin has since stabilized    Generalized weakness  Assessment & Plan  PT/OT is recommending rehab    VTE Pharmacologic Prophylaxis:   Pharmacologic: Pharmacologic VTE Prophylaxis contraindicated due to anemia  Mechanical VTE Prophylaxis in Place: Yes    Patient Centered Rounds: I have performed bedside rounds with nursing staff today  Discussions with Specialists or Other Care Team Provider: cm, nursing    Education and Discussions with Family / Patient: pt    Time Spent for Care: 30 minutes  More than 50% of total time spent on counseling and coordination of care as described above  Current Length of Stay: 6 day(s)    Current Patient Status: Inpatient   Certification Statement: The patient will continue to require additional inpatient hospital stay due to see below    Discharge Plan:  Medically clear awaiting placement rehab    Code Status: Level 1 - Full Code      Subjective:   Denies chest pain, sob, cough, fevers    Objective:     Vitals:   Temp (24hrs), Av 6 °F (37 °C), Min:98 °F (36 7 °C), Max:99 1 °F (37 3 °C)    Temp:  [98 °F (36 7 °C)-99 1 °F (37 3 °C)] 99 1 °F (37 3 °C)  HR:  [70-87] 72  Resp:  [16] 16  BP: (116-175)/(58-83) 121/58  SpO2:  [98 %-100 %] 98 %  Body mass index is 30 75 kg/m²  Input and Output Summary (last 24 hours): Intake/Output Summary (Last 24 hours) at 8/3/2022 0839  Last data filed at 8/3/2022 0607  Gross per 24 hour   Intake 656 25 ml   Output 1820 ml   Net -1163 75 ml       Physical Exam:     Physical Exam  Constitutional:       General: He is not in acute distress  Appearance: He is well-developed  He is not diaphoretic  HENT:      Head: Normocephalic and atraumatic  Nose: Nose normal       Mouth/Throat:      Pharynx: No oropharyngeal exudate  Eyes:      General: No scleral icterus  Conjunctiva/sclera: Conjunctivae normal    Cardiovascular:      Rate and Rhythm: Normal rate and regular rhythm  Heart sounds: Normal heart sounds  No murmur heard  No friction rub  No gallop  Pulmonary:      Effort: Pulmonary effort is normal  No respiratory distress  Breath sounds: Normal breath sounds  No wheezing or rales  Chest:      Chest wall: No tenderness  Abdominal:      General: Bowel sounds are normal  There is no distension  Palpations: Abdomen is soft  Tenderness: There is no abdominal tenderness  There is no guarding  Musculoskeletal:         General: No tenderness or deformity  Normal range of motion  Cervical back: Normal range of motion and neck supple  Skin:     General: Skin is warm and dry  Findings: No erythema  Neurological:      Mental Status: He is alert  Mental status is at baseline  Additional Data:     Labs:    Results from last 7 days   Lab Units 08/03/22  0531   WBC Thousand/uL 10 04   HEMOGLOBIN g/dL 7 5*   HEMATOCRIT % 22 7*   PLATELETS Thousands/uL 146*   NEUTROS PCT % 66   LYMPHS PCT % 17   MONOS PCT % 14*   EOS PCT % 2     Results from last 7 days   Lab Units 08/03/22  0531 07/28/22  0619 07/27/22  2307   SODIUM mmol/L 133*   < > 139   POTASSIUM mmol/L 4 8   < > 4 1   CHLORIDE mmol/L 101   < > 108   CO2 mmol/L 23   < > 19*   BUN mg/dL 17   < > 31*   CREATININE mg/dL 0 99   < > 1 50*   ANION GAP mmol/L 9   < > 12   CALCIUM mg/dL 8 2*   < > 8 8   ALBUMIN g/dL  --   --  3 4*   TOTAL BILIRUBIN mg/dL  --   --  0 31   ALK PHOS U/L  --   --  1,044*   ALT U/L  --   --  11*   AST U/L  --   --  41   GLUCOSE RANDOM mg/dL 94   < > 103    < > = values in this interval not displayed  Results from last 7 days   Lab Units 07/27/22  2307   INR  1 18                       * I Have Reviewed All Lab Data Listed Above  * Additional Pertinent Lab Tests Reviewed:  All Labs Within Last 24 Hours Reviewed    Imaging:    Imaging Reports Reviewed Today Include: na  Imaging Personally Reviewed by Myself Includes:  na    Recent Cultures (last 7 days):     Results from last 7 days   Lab Units 07/28/22  1444 07/28/22  1424 07/28/22  0110   URINE CULTURE  No Growth <100 cfu/mL No Growth <100 cfu/mL 40,000-49,000 cfu/ml Aerococcus urinae* Last 24 Hours Medication List:   Current Facility-Administered Medications   Medication Dose Route Frequency Provider Last Rate    acetaminophen  650 mg Oral Q6H PRN [facility]      allopurinol  300 mg Oral Daily [place], [place]      cefTRIAXone  1,000 mg Intravenous Q24H Ion Luevano MD 1,000 mg (08/02/22 0904)    docusate sodium  100 mg Oral BID Crta  Last 82, PAMarbellaC      finasteride  5 mg Oral Daily [place], [place]      metoprolol succinate  100 mg Oral Daily [place], [place]      oxyCODONE  5 mg Oral Q6H PRN April [facility]      pravastatin  40 mg Oral Daily With [place], [place]      tamsulosin  0 4 mg Oral Daily With TRICIA Loredo          Today, Patient Was Seen By: Inés Maldonado MD    ** Please Note: Dictation voice to text software may have been used in the creation of this document   **

## 2022-08-04 LAB
ALBUMIN SERPL BCP-MCNC: 2.4 G/DL (ref 3.5–5)
ALBUMIN SERPL ELPH-MCNC: 2.89 G/DL (ref 3.5–5)
ALBUMIN SERPL ELPH-MCNC: 49 % (ref 52–65)
ALP SERPL-CCNC: 837 U/L (ref 46–116)
ALPHA1 GLOB SERPL ELPH-MCNC: 0.52 G/DL (ref 0.1–0.4)
ALPHA1 GLOB SERPL ELPH-MCNC: 8.8 % (ref 2.5–5)
ALPHA2 GLOB SERPL ELPH-MCNC: 1 G/DL (ref 0.4–1.2)
ALPHA2 GLOB SERPL ELPH-MCNC: 17 % (ref 7–13)
ALT SERPL W P-5'-P-CCNC: 13 U/L (ref 12–78)
APTT PPP: 42 SECONDS (ref 23–37)
AST SERPL W P-5'-P-CCNC: 37 U/L (ref 5–45)
BETA GLOB ABNORMAL SERPL ELPH-MCNC: 0.37 G/DL (ref 0.4–0.8)
BETA1 GLOB SERPL ELPH-MCNC: 6.3 % (ref 5–13)
BETA2 GLOB SERPL ELPH-MCNC: 5.8 % (ref 2–8)
BETA2+GAMMA GLOB SERPL ELPH-MCNC: 0.34 G/DL (ref 0.2–0.5)
BILIRUB DIRECT SERPL-MCNC: 0.15 MG/DL (ref 0–0.2)
BILIRUB SERPL-MCNC: 0.65 MG/DL (ref 0.2–1)
BLD SMEAR INTERP: NORMAL
D DIMER PPP FEU-MCNC: >20 UG/ML FEU
DAT POLY-SP REAG RBC QL: NEGATIVE
DEPRECATED AT III PPP: 90 % OF NORMAL (ref 92–136)
FDP BLD QL AGGL: ABNORMAL
FIBRINOGEN PPP-MCNC: 359 MG/DL (ref 227–495)
GAMMA GLOB ABNORMAL SERPL ELPH-MCNC: 0.77 G/DL (ref 0.5–1.6)
GAMMA GLOB SERPL ELPH-MCNC: 13.1 % (ref 12–22)
IGG/ALB SER: 0.96 {RATIO} (ref 1.1–1.8)
INR PPP: 1.24 (ref 0.84–1.19)
LDH SERPL-CCNC: 559 U/L (ref 81–234)
PLATELET # BLD AUTO: 158 THOUSANDS/UL (ref 149–390)
PMV BLD AUTO: 11.7 FL (ref 8.9–12.7)
PROT PATTERN SERPL ELPH-IMP: ABNORMAL
PROT SERPL-MCNC: 5.9 G/DL (ref 6.4–8.2)
PROT SERPL-MCNC: 6.1 G/DL (ref 6.4–8.4)
PROTHROMBIN TIME: 15.3 SECONDS (ref 11.6–14.5)
TPA PPP QL CHRO: 77 % OF NORMAL (ref 77–138)

## 2022-08-04 PROCEDURE — 85379 FIBRIN DEGRADATION QUANT: CPT | Performed by: INTERNAL MEDICINE

## 2022-08-04 PROCEDURE — 83615 LACTATE (LD) (LDH) ENZYME: CPT | Performed by: INTERNAL MEDICINE

## 2022-08-04 PROCEDURE — 86880 COOMBS TEST DIRECT: CPT | Performed by: INTERNAL MEDICINE

## 2022-08-04 PROCEDURE — 80076 HEPATIC FUNCTION PANEL: CPT | Performed by: INTERNAL MEDICINE

## 2022-08-04 PROCEDURE — 85610 PROTHROMBIN TIME: CPT | Performed by: INTERNAL MEDICINE

## 2022-08-04 PROCEDURE — 99232 SBSQ HOSP IP/OBS MODERATE 35: CPT | Performed by: INTERNAL MEDICINE

## 2022-08-04 PROCEDURE — 85420 FIBRINOLYTIC PLASMINOGEN: CPT | Performed by: INTERNAL MEDICINE

## 2022-08-04 PROCEDURE — 85300 ANTITHROMBIN III ACTIVITY: CPT | Performed by: INTERNAL MEDICINE

## 2022-08-04 PROCEDURE — 85384 FIBRINOGEN ACTIVITY: CPT | Performed by: INTERNAL MEDICINE

## 2022-08-04 PROCEDURE — 85362 FIBRIN DEGRADATION PRODUCTS: CPT | Performed by: INTERNAL MEDICINE

## 2022-08-04 PROCEDURE — 85730 THROMBOPLASTIN TIME PARTIAL: CPT | Performed by: INTERNAL MEDICINE

## 2022-08-04 PROCEDURE — 85049 AUTOMATED PLATELET COUNT: CPT | Performed by: INTERNAL MEDICINE

## 2022-08-04 PROCEDURE — 97110 THERAPEUTIC EXERCISES: CPT

## 2022-08-04 PROCEDURE — 97116 GAIT TRAINING THERAPY: CPT

## 2022-08-04 RX ORDER — BISACODYL 10 MG
10 SUPPOSITORY, RECTAL RECTAL DAILY PRN
Status: DISCONTINUED | OUTPATIENT
Start: 2022-08-04 | End: 2022-08-12 | Stop reason: HOSPADM

## 2022-08-04 RX ADMIN — PRAVASTATIN SODIUM 40 MG: 40 TABLET ORAL at 18:03

## 2022-08-04 RX ADMIN — FINASTERIDE 5 MG: 5 TABLET, FILM COATED ORAL at 08:28

## 2022-08-04 RX ADMIN — CEFTRIAXONE 1000 MG: 1 INJECTION, SOLUTION INTRAVENOUS at 08:34

## 2022-08-04 RX ADMIN — ALLOPURINOL 300 MG: 300 TABLET ORAL at 08:28

## 2022-08-04 RX ADMIN — OXYCODONE HYDROCHLORIDE 5 MG: 5 TABLET ORAL at 08:28

## 2022-08-04 RX ADMIN — DOCUSATE SODIUM 100 MG: 100 CAPSULE, LIQUID FILLED ORAL at 08:28

## 2022-08-04 RX ADMIN — TAMSULOSIN HYDROCHLORIDE 0.4 MG: 0.4 CAPSULE ORAL at 18:03

## 2022-08-04 RX ADMIN — DOCUSATE SODIUM 100 MG: 100 CAPSULE, LIQUID FILLED ORAL at 18:03

## 2022-08-04 RX ADMIN — POLYETHYLENE GLYCOL 3350 17 G: 17 POWDER, FOR SOLUTION ORAL at 08:30

## 2022-08-04 NOTE — PROGRESS NOTES
[facility]  Progress Note - Argenis Zelaya 1950, 67 y o  male MRN: [id number]  Unit/Bed#: -[facility] Encounter: [id number]  Primary Care Provider: TRICIA Cruz   Date and time admitted to hospital: 7/27/2022 11:40 PM    * Acute blood loss anemia  Assessment & Plan  Hgb 6 6 on admission  Last hgb 13 on labs from 10/2021  Has assoc fatigue   Likely 2/2 hematuria   Hematuria since resolved  Status post 3 units packed red blood cells  Has since stabilized at approximately 8    MICA (acute kidney injury) ([facility] [address] )  Assessment & Plan  · Secondary to by hydronephrosis  · Presented creatinine 1 5  · Resolved with ivf    Metastasis of unknown origin [facility])  Assessment & Plan  CT a/p 07/27: revealed 1 cm nodule in the right middle lobe not seen on the prior exam from 6/22/2020 concerning for metastasis and diffuse sclerosis throughout the osseous pelvis and in the L3, L2, T12, T11, T10, and T8 vertebral bodies also concerning for metastasis   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 as well  Prostate likely source   Status post IR guided biopsy  Will have outpatient follow-up with Oncology    Gross hematuria  Assessment & Plan  Patient presents with gross hematuria x 1m  Had had prostate biopsy in past, last bx 10/2021, cytology negative  Follows with a urology in [address] [place] [address]    CT a/p 07/27: asymmetrically enlarged prostate gland; left side is larger  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  Bilateral hydronephrosis likely from obstruction at the UVJ secondary to enlarged/invading prostate  No renal stones  S/p B/l PCN by IR  Hold aspirin/nsaid   Urinary retention protocol  S/p 3 units prbc   Hemoglobin has since stabilized    Generalized weakness  Assessment & Plan  PT/OT is recommending rehab      VTE Pharmacologic Prophylaxis:   Pharmacologic: Pharmacologic VTE Prophylaxis contraindicated due to anemia  Mechanical VTE Prophylaxis in Place: Yes    Patient Centered Rounds:  I have performed bedside rounds with nursing staff today  Discussions with Specialists or Other Care Team Provider: cm, nursing    Education and Discussions with Family / Patient: pt    Time Spent for Care: 30 minutes  More than 50% of total time spent on counseling and coordination of care as described above  Current Length of Stay: 7 day(s)    Current Patient Status: Inpatient   Certification Statement: The patient will continue to require additional inpatient hospital stay due to see below    Discharge Plan:  Medically clear waiting rehab    Code Status: Level 1 - Full Code      Subjective:   Denies chest pain, sob, cough, fevers    Objective:     Vitals:   Temp (24hrs), Av 6 °F (37 °C), Min:97 9 °F (36 6 °C), Max:99 2 °F (37 3 °C)    Temp:  [97 9 °F (36 6 °C)-99 2 °F (37 3 °C)] 97 9 °F (36 6 °C)  HR:  [70-75] 72  Resp:  [18-20] 18  BP: ()/(59-63) 96/63  SpO2:  [95 %-97 %] 95 %  Body mass index is 30 94 kg/m²  Input and Output Summary (last 24 hours): Intake/Output Summary (Last 24 hours) at 2022 0914  Last data filed at 2022 2774  Gross per 24 hour   Intake 700 ml   Output 2770 ml   Net -2070 ml       Physical Exam:     Physical Exam  Constitutional:       General: He is not in acute distress  Appearance: He is well-developed  He is not diaphoretic  HENT:      Head: Normocephalic and atraumatic  Nose: Nose normal       Mouth/Throat:      Pharynx: No oropharyngeal exudate  Eyes:      General: No scleral icterus  Conjunctiva/sclera: Conjunctivae normal    Cardiovascular:      Rate and Rhythm: Normal rate and regular rhythm  Heart sounds: Normal heart sounds  No murmur heard  No friction rub  No gallop  Pulmonary:      Effort: Pulmonary effort is normal  No respiratory distress  Breath sounds: Normal breath sounds  No wheezing or rales  Chest:      Chest wall: No tenderness  Abdominal:      General: Bowel sounds are normal  There is no distension  Palpations: Abdomen is soft  Tenderness: There is no abdominal tenderness  There is no guarding  Musculoskeletal:         General: No tenderness or deformity  Normal range of motion  Cervical back: Normal range of motion and neck supple  Skin:     General: Skin is warm and dry  Findings: No erythema  Neurological:      Mental Status: He is alert  Mental status is at baseline  (     Additional Data:     Labs:    Results from last 7 days   Lab Units 08/04/22  0544 08/03/22  1618 08/03/22  0531   WBC Thousand/uL  --   --  10 04   HEMOGLOBIN g/dL  --   --  7 5*   HEMATOCRIT %  --   --  22 7*   PLATELETS Thousands/uL 158   < > 146*   NEUTROS PCT %  --   --  66   LYMPHS PCT %  --   --  17   MONOS PCT %  --   --  14*   EOS PCT %  --   --  2    < > = values in this interval not displayed  Results from last 7 days   Lab Units 08/04/22  0544 08/03/22  0531   SODIUM mmol/L  --  133*   POTASSIUM mmol/L  --  4 8   CHLORIDE mmol/L  --  101   CO2 mmol/L  --  23   BUN mg/dL  --  17   CREATININE mg/dL  --  0 99   ANION GAP mmol/L  --  9   CALCIUM mg/dL  --  8 2*   ALBUMIN g/dL 2 4*  --    TOTAL BILIRUBIN mg/dL 0 65  --    ALK PHOS U/L 837*  --    ALT U/L 13  --    AST U/L 37  --    GLUCOSE RANDOM mg/dL  --  94     Results from last 7 days   Lab Units 08/04/22  0804   INR  1 24*                       * I Have Reviewed All Lab Data Listed Above  * Additional Pertinent Lab Tests Reviewed:  All Labs Within Last 24 Hours Reviewed    Imaging:    Imaging Reports Reviewed Today Include: na  Imaging Personally Reviewed by Myself Includes:  na    Recent Cultures (last 7 days):     Results from last 7 days   Lab Units 07/28/22  1444 07/28/22  1424   URINE CULTURE  No Growth <100 cfu/mL No Growth <100 cfu/mL       Last 24 Hours Medication List:   Current Facility-Administered Medications   Medication Dose Route Frequency Provider Last Rate    acetaminophen  650 mg Oral Q6H PRN Austin Zaragoza PA-C      allopurinol  300 mg Oral Daily BART Avalos      cefTRIAXone  1,000 mg Intravenous Q24H Ion Luevano MD 1,000 mg (08/04/22 0834)    docusate sodium  100 mg Oral BID Solis Zamarripa PA-C      finasteride  5 mg Oral Daily [place], [place]      metoprolol succinate  100 mg Oral Daily [place], [place]      oxyCODONE  5 mg Oral Q6H PRN Orval BART Saenz      polyethylene glycol  17 g Oral Daily Ion Luevano MD      pravastatin  40 mg Oral Daily With [place], [place]      tamsulosin  0 4 mg Oral Daily With TRICIA Loredo          Today, Patient Was Seen By: Maricruz Christiansen MD    ** Please Note: Dictation voice to text software may have been used in the creation of this document   **

## 2022-08-04 NOTE — ASSESSMENT & PLAN NOTE
Hgb 6 6 on admission  Last hgb 13 on labs from 10/2021   Has assoc fatigue   Likely 2/2 hematuria   Hematuria since resolved  Status post 3 units packed red blood cells  Has since stabilized at approximately 8 Statement Selected

## 2022-08-04 NOTE — PHYSICAL THERAPY NOTE
Physical Therapy Evaluation     Patient's Name: Natalia Madrigal    Admitting Diagnosis  Anemia [D64 9]  Paralysis ([facility] [address] ) [G83 9]  Hematuria [R31 9]  Other hydronephrosis [N13 39]  Benign prostatic hyperplasia without lower urinary tract symptoms [N40 0]    Problem List  Patient Active Problem List   Diagnosis    Shortness of breath    Prolonged Q-T interval on ECG    Acute respiratory failure with hypoxia ([facility] [address] ) secondary to presumed COVID-19 infection    Suspected COVID-19 virus infection    Hypertension    Encounter to establish care    Medicare annual wellness visit, subsequent    Generalized weakness    Need for vaccination    Cough    Sinus congestion    Gross hematuria    Metastasis of unknown origin ([facility] [address] )    Acute blood loss anemia    Right shoulder pain    MICA (acute kidney injury) [facility])       Past Medical History  Past Medical History:   Diagnosis Date    Gout     Hypertension        Past Surgical History  Past Surgical History:   Procedure Laterality Date    IR BIOPSY BONE  8/2/2022    IR NEPHROSTOMY TUBE PLACEMENT  7/28/2022 08/04/22 1026   PT Last Visit   PT Visit Date 08/04/22   Pain Assessment   Pain Assessment Tool 0-10   Pain Score 6   Pain Location/Orientation Orientation: Bilateral;Location: Leg   Restrictions/Precautions   Weight Bearing Precautions Per Order No   Other Precautions Multiple lines; Fall Risk;Pain;Limb alert  (Citizen of [place] speaking, nephrostomy tube/drain)   Cognition   Overall Cognitive Status WFL   Orientation Level Oriented X4   Following Commands Follows one step commands with increased time or repetition   Subjective   Subjective "I would like to walk"   Bed Mobility   Sit to Supine 2  Maximal assistance   Additional items Assist x 1;HOB elevated; Increased time required;Verbal cues;LE management   Transfers   Sit to Stand 3  Moderate assistance   Additional items Assist x 2;Assist x 1   Stand to Sit 3  Moderate assistance   Additional items Assist x 1; Increased time required;Verbal cues   Additional Comments STS with armrest from chair  extended rest break  Assist from L side by therapist    Ambulation/Elevation   Gait pattern Decreased foot clearance   Gait Assistance 4  Minimal assist   Additional items Assist x 1   Assistive Device Rolling walker   Distance 45', 35'   Balance   Static Sitting Fair +   Dynamic Sitting Fair   Static Standing Fair -   Dynamic Standing Poor   Ambulatory Fair -   Endurance Deficit   Endurance Deficit Yes   Endurance Deficit Description fatigue, pain   Activity Tolerance   Activity Tolerance Patient limited by fatigue;Treatment limited secondary to medical complications (Comment)   Nurse Made Aware RN ok to see   Exercises   Hip Flexion Sitting;15 reps;Bilateral   Hip Abduction Sitting;15 reps;Bilateral   Hip Adduction 15 reps;Bilateral;Sitting   Knee AROM Long Arc Quad Sitting;15 reps;Bilateral   Ankle Pumps Sitting;15 reps;Bilateral   Assessment   Prognosis Good   Problem List Decreased strength; Impaired balance;Decreased endurance;Decreased mobility; Decreased cognition;Pain   Assessment Pt seen for PT treatment session this date with interventions consisting of gait training w/ emphasis on improving pt's ability to ambulate level surfaces x 45, 35' with min A provided by therapist with RW, Therapeutic exercise consisting of: AROM 15 reps B LE in sitting position and therapeutic activity consisting of training: supine<>sit transfers and sit<>stand transfers  Pt agreeable to PT treatment session upon arrival, pt found seated OOB in recliner, in no apparent distress  In comparison to previous session, pt with improvements in activity tolerance, increased ambulation distance, participation with TE  Post session: pt returned BTB, bed alarm engaged, all needs in reach and RN notified of session findings/recommendations   Continue to recommend post acute rehabilitation services at time of d/c in order to maximize pt's functional independence and safety w/ mobility  Pt continues to be functioning below baseline level, and remains limited 2* factors listed above and including strength deficits, decreased activity tolerance, decreased functional mobility, impaired balance  Pt  Is currently Min A x1 for ambulation, Mod A x1 for STS transfers  PT will continue to see pt during current hospitalization in order to address the deficits listed above and provide interventions consistent w/ POC in effort to achieve STGs  Barriers to Discharge Decreased caregiver support  (decline in functional baseline)   Goals   Patient Goals none stated   STG Expiration Date 08/15/22   Short Term Goal #1 1  Pt will complete bed mobility with Supervised x1 to increase functional mobility  2  Pt will complete sit to stand transfers with supervised A x1 to increase functional mobility  3  Pt will ambulate 75ft with RW with supervision without LOB 4  Pt will increase B/L LE strength by 1 grade to facilitate improved functional mobility with decreased risk of falls  5  Pt will increase standing balance to fair in order to decrease risk of falls  PT Treatment Day 1   Plan   Treatment/Interventions Functional transfer training;LE strengthening/ROM; Therapeutic exercise; Endurance training;Bed mobility;Gait training;Equipment eval/education;Patient/family training;Spoke to nursing;OT   Progress Progressing toward goals   PT Frequency 4-6x/wk   Recommendation   PT Discharge Recommendation Post acute rehabilitation services   AM-PAC Basic Mobility Inpatient   Turning in Bed Without Bedrails 2   Lying on Back to Sitting on Edge of Flat Bed 2   Moving Bed to Chair 3   Standing Up From Chair 2   Walk in Room 3   Climb 3-5 Stairs 2   Basic Mobility Inpatient Raw Score 14   Basic Mobility Standardized Score 35 55   Highest Level Of Mobility   JH-HLM Goal 4: Move to chair/commode   JH-HLM Achieved 7: Walk 25 feet or more             Renee Prince, PT

## 2022-08-04 NOTE — UTILIZATION REVIEW
Continued Stay Review    Date: 8/4/22                          Current Patient Class: IP  Current Level of Care: MS    HPI:72 y o  male initially admitted on 7/27/22  Acute blood loss anemia  Hgb 6 6 on admission, likely 2/2 hematuria    Assessment/Plan: Hematuria resolved, s/p 3 Units PRBC, HGB approx 8, MICA resolved s/p IVF, cr 1 5, metastatic of unknown origin: CT chest reveals lung mets, prostate likely source, s/p IR guided bx, results pending, outpt f/u with oncology  Had had prostate biopsy in past, last bx 10/2021, cytology negative  Follows with a urology in [place], S/P B/L PCN by IR, hold all ASA and NSAID, PT/OT recommending post acute d/c to rehab  Case management to assist with placement      Vital Signs:   Date/Time Temp Pulse Resp BP MAP (mmHg) SpO2 Calculated FIO2 (%) - Nasal Cannula Nasal Cannula O2 Flow Rate (L/min) O2 Device   08/04/22 0700 97 9 °F (36 6 °C) 72 -- 96/63 74 95 % -- -- --   08/03/22 23:22:32 99 2 °F (37 3 °C) 70 18 125/61 82 97 % -- -- --   08/03/22 14:50:53 98 6 °F (37 °C) 75 20 120/59 79 97 % -- -- --   08/03/22 07:26:49 99 1 °F (37 3 °C) 72 16 121/58 79 98 % -- -- --   08/02/22 22:38:58 98 7 °F (37 1 °C) 76 16 120/58 79 98 % -- -- --   08/02/22 16:45:18 98 °F (36 7 °C) 73 16 116/60 79 100 % -- -- --   08/02/22 10:44:19 -- 70 16 129/67 -- 100 % -- -- --   08/02/22 10:39:07 -- 71 16 145/66 -- 100 % -- -- --   08/02/22 10:34:46 -- 73 16 142/76 -- 100 % -- -- --   08/02/22 10:31:54 -- 76 16 175/83 Abnormal  -- 98 % -- -- --   08/02/22 10:25:02 -- 87 16 164/79 -- 98 % -- -- --   08/02/22 1025 -- -- -- -- -- 98 % 28 2 L/min Nasal cannula   08/02/22 07:42:02 98 1 °F (36 7 °C) 79 12 142/81 101 97 % -- -- --     08/04/22 0600 97 8 kg (215 lb 9 8 oz) Bed scale --   08/03/22 0532 97 2 kg (214 lb 4 6 oz) Bed scale --   08/03/22 0527 97 2 kg (214 lb 4 6 oz) Bed scale --   08/02/22 0600 96 1 kg (211 lb 13 8 oz) Bed scale --   08/01/22 0600 97 2 kg (214 lb 4 6 oz) Bed scale --   07/31/22 0626 97 kg (213 lb 13 5 oz) Bed scale --       Last data filed at 8/4/2022 0910      Gross per 24 hour   Intake 700 ml   Output 2770 ml   Net -2070 ml      Pertinent Labs/Diagnostic Results:   8/1/22 CT chest w contrast :  Evidence of metastatic disease in the chest including multiple pulmonary nodules, mediastinal lymphadenopathy, and multiple osseous lesions  Right accurately adenopathy may also be metastatic, or could be secondary to ipsilateral vaccination  2   Bilateral pleural effusions, left larger than right, with bibasilar atelectasis including complete collapse of the left lower lobe  8/2/22 IR biopsy bone: Local anesthesia was administered  Under CT guidance, 10-gauge coaxial introducer needle was advanced into the left iliac bone sclerotic lesion   12-gauge bone lesion biopsy needle was advanced through the introducer needle and was used to obtain 8 core   needle samples which were placed in formalin   Needle was removed and bandage applied,   Additional Details   Specimens removed: 3 core needle samples of left iliac bone sclerotic lesion  Estimated blood loss (mL): 1   Complications: No immediate complications  Plan: Specimens sent for evaluation  Results from last 7 days   Lab Units 08/04/22  0544 08/03/22  1618 08/03/22  0531 08/02/22  0507 08/01/22  1104 07/31/22  0456 07/31/22  0456 07/29/22  2216   WBC Thousand/uL  --   --  10 04 9 94 9 52   < > 11 46*  --    HEMOGLOBIN g/dL  --   --  7 5* 8 0* 7 7*  --  7 8* 6 0*   HEMATOCRIT %  --   --  22 7* 24 6* 23 6*  --  23 8* 18 2*   PLATELETS Thousands/uL 158 154 146* 142* 134*   < > 104*  --    NEUTROS ABS Thousands/µL  --   --  6 62 6 19 5 96   < > 8 00*  --     < > = values in this interval not displayed           Results from last 7 days   Lab Units 08/03/22  0531 08/02/22  0507 08/01/22  1104 07/31/22  0456 07/28/22  1722   SODIUM mmol/L 133* 133* 136 136 139   POTASSIUM mmol/L 4 8 4 9 4 5 5 1 4 7   CHLORIDE mmol/L 101 101 105 106 110*   CO2 mmol/L 23 23 23 19* 17*   ANION GAP mmol/L 9 9 8 11 12   BUN mg/dL 17 15 17 21 22   CREATININE mg/dL 0 99 0 98 1 13 1 67* 1 07   EGFR ml/min/1 73sq m 75 76 64 39 68   CALCIUM mg/dL 8 2* 8 8 8 2* 8 3 8 5     Results from last 7 days   Lab Units 08/04/22  0544 08/02/22  0507   AST U/L 37  --    ALT U/L 13  --    ALK PHOS U/L 837*  --    TOTAL PROTEIN g/dL 6 1* 5 9*   ALBUMIN g/dL 2 4*  --    TOTAL BILIRUBIN mg/dL 0 65  --    BILIRUBIN DIRECT mg/dL 0 15  --          Results from last 7 days   Lab Units 08/03/22  0531 08/02/22  0507 08/01/22  1104 07/31/22  0456 07/28/22  1722   GLUCOSE RANDOM mg/dL 94 87 107 100 103       Results from last 7 days   Lab Units 08/04/22  0804   D-DIMER QUANTITATIVE ug/ml FEU >20 00*     Results from last 7 days   Lab Units 08/04/22  0804   PROTIME seconds 15 3*   INR  1 24*   PTT seconds 42*                             Results from last 7 days   Lab Units 08/02/22  0507   FERRITIN ng/mL 239                                                         Results from last 7 days   Lab Units 07/28/22  1444 07/28/22  1424   URINE CULTURE  No Growth <100 cfu/mL No Growth <100 cfu/mL                 Medications:   Scheduled Medications:  allopurinol, 300 mg, Oral, Daily  cefTRIAXone, 1,000 mg, Intravenous, Q24H  docusate sodium, 100 mg, Oral, BID  finasteride, 5 mg, Oral, Daily  metoprolol succinate, 100 mg, Oral, Daily  polyethylene glycol, 17 g, Oral, Daily  pravastatin, 40 mg, Oral, Daily With Dinner  tamsulosin, 0 4 mg, Oral, Daily With Dinner      Continuous IV Infusions:   sodium chloride 0 9 % infusion  Rate: 75 mL/hr Dose: 75 mL/hr  Freq: Continuous Route: IV  Indications of Use: IV Hydration  Last Dose: Stopped (08/02/22 1232)  Start: 07/28/22 0445 End: 08/02/22 1217     PRN Meds:  acetaminophen, 650 mg, Oral, Q6H PRN  bisacodyl, 10 mg, Rectal, Daily PRN  oxyCODONE, 5 mg, Oral, Q6H PRN 8/4 x1         Discharge Plan: TBD    Network Utilization Review Department  ATTENTION: Please call with any questions or concerns to [phone] and carefully listen to the prompts so that you are directed to the right person  All voicemails are confidential   Harlene Pair all requests for admission clinical reviews, approved or denied determinations and any other requests to dedicated fax number below belonging to the campus where the patient is receiving treatment   List of dedicated fax numbers for the Facilities:  [address] DENIALS (Administrative/Medical Necessity) [phone]   [address] (Maternity/NICU/Pediatrics) [phone]   [address]  [address] Medical [place] [address] [address] [address] [place] [address] [place] [address] [phone]

## 2022-08-04 NOTE — CASE MANAGEMENT
Case Management Progress Note    Patient name Natalia Madrigal  Location /-81 MRN [id number]  : 1950 Date 2022       LOS (days): 7  Geometric Mean LOS (GMLOS) (days): 3 50  Days to GMLOS:-3 3        OBJECTIVE:        Current admission status: Inpatient  Preferred Pharmacy:   [address]  14 [facility] [address] Jam[address] Marshall [address] [place] [address]   Phone: [phone] Fax: [phone]    Primary Care Provider: TRICIA Holly    Primary Insurance: MEDICARE MISC REPLACEMENT  Secondary Insurance:     PROGRESS NOTE:  CM received message from [employer] indicating she's still working on the one time contract with [employer] provider  Patient's daughter plans to visit this afternoon and will hopefully have vaccine proof/information for this CM to forward to [facility] [address]  CM will continue to follow

## 2022-08-04 NOTE — ASSESSMENT & PLAN NOTE
Patient presents with gross hematuria x 1m  Had had prostate biopsy in past, last bx 10/2021, cytology negative  Follows with a urology in [address] [place] [address]    CT a/p 07/27: asymmetrically enlarged prostate gland; left side is larger  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  Bilateral hydronephrosis likely from obstruction at the UVJ secondary to enlarged/invading prostate  No renal stones  S/p B/l PCN by IR  Hold aspirin/nsaid   Urinary retention protocol  S/p 3 units prbc     Hemoglobin has since stabilized

## 2022-08-04 NOTE — PLAN OF CARE
Problem: PHYSICAL THERAPY ADULT  Goal: Performs mobility at highest level of function for planned discharge setting  See evaluation for individualized goals  Description: Treatment/Interventions: Functional transfer training, LE strengthening/ROM, Therapeutic exercise, Endurance training, Bed mobility, Gait training, Equipment eval/education, Patient/family training, Spoke to nursing, OT          See flowsheet documentation for full assessment, interventions and recommendations  Outcome: Progressing  Note: Prognosis: Good  Problem List: Decreased strength, Impaired balance, Decreased endurance, Decreased mobility, Decreased cognition, Pain  Assessment: Pt seen for PT treatment session this date with interventions consisting of gait training w/ emphasis on improving pt's ability to ambulate level surfaces x 45, 35' with min A provided by therapist with RW, Therapeutic exercise consisting of: AROM 15 reps B LE in sitting position and therapeutic activity consisting of training: supine<>sit transfers and sit<>stand transfers  Pt agreeable to PT treatment session upon arrival, pt found seated OOB in recliner, in no apparent distress  In comparison to previous session, pt with improvements in activity tolerance, increased ambulation distance, participation with TE  Post session: pt returned BTB, bed alarm engaged, all needs in reach and RN notified of session findings/recommendations  Continue to recommend post acute rehabilitation services at time of d/c in order to maximize pt's functional independence and safety w/ mobility  Pt continues to be functioning below baseline level, and remains limited 2* factors listed above and including strength deficits, decreased activity tolerance, decreased functional mobility, impaired balance  Pt  Is currently Min A x1 for ambulation, Mod A x1 for STS transfers   PT will continue to see pt during current hospitalization in order to address the deficits listed above and provide interventions consistent w/ POC in effort to achieve STGs  Barriers to Discharge: Decreased caregiver support (decline in functional baseline)     PT Discharge Recommendation: Post acute rehabilitation services    See flowsheet documentation for full assessment

## 2022-08-04 NOTE — ASSESSMENT & PLAN NOTE
CT a/p 07/27: revealed 1 cm nodule in the right middle lobe not seen on the prior exam from 6/22/2020 concerning for metastasis and diffuse sclerosis throughout the osseous pelvis and in the L3, L2, T12, T11, T10, and T8 vertebral bodies also concerning for metastasis   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 as well  Prostate likely source   Status post IR guided biopsy  Will have outpatient follow-up with Oncology

## 2022-08-05 ENCOUNTER — TELEPHONE (OUTPATIENT)
Dept: OTHER | Facility: HOSPITAL | Age: 72
End: 2022-08-05

## 2022-08-05 ENCOUNTER — TELEPHONE (OUTPATIENT)
Dept: GYNECOLOGIC ONCOLOGY | Facility: CLINIC | Age: 72
End: 2022-08-05

## 2022-08-05 DIAGNOSIS — C79.51 BONE METASTASIS (HCC): Primary | ICD-10-CM

## 2022-08-05 DIAGNOSIS — C61 PROSTATE CANCER (HCC): ICD-10-CM

## 2022-08-05 PROCEDURE — 99232 SBSQ HOSP IP/OBS MODERATE 35: CPT | Performed by: INTERNAL MEDICINE

## 2022-08-05 RX ORDER — BICALUTAMIDE 50 MG/1
50 TABLET, FILM COATED ORAL DAILY
Qty: 30 TABLET | Refills: 0 | Status: SHIPPED | OUTPATIENT
Start: 2022-08-05 | End: 2022-09-04

## 2022-08-05 RX ORDER — BICALUTAMIDE 50 MG/1
50 TABLET, FILM COATED ORAL DAILY
Status: DISCONTINUED | OUTPATIENT
Start: 2022-08-05 | End: 2022-08-12 | Stop reason: HOSPADM

## 2022-08-05 RX ADMIN — POLYETHYLENE GLYCOL 3350 17 G: 17 POWDER, FOR SOLUTION ORAL at 10:35

## 2022-08-05 RX ADMIN — BISACODYL 10 MG: 10 SUPPOSITORY RECTAL at 15:19

## 2022-08-05 RX ADMIN — TAMSULOSIN HYDROCHLORIDE 0.4 MG: 0.4 CAPSULE ORAL at 15:19

## 2022-08-05 RX ADMIN — BICALUTAMIDE 50 MG: 50 TABLET, FILM COATED ORAL at 18:20

## 2022-08-05 RX ADMIN — DOCUSATE SODIUM 100 MG: 100 CAPSULE, LIQUID FILLED ORAL at 10:38

## 2022-08-05 RX ADMIN — METOPROLOL SUCCINATE 100 MG: 100 TABLET, EXTENDED RELEASE ORAL at 10:34

## 2022-08-05 RX ADMIN — CEFTRIAXONE 1000 MG: 1 INJECTION, SOLUTION INTRAVENOUS at 11:53

## 2022-08-05 RX ADMIN — ALLOPURINOL 300 MG: 300 TABLET ORAL at 10:35

## 2022-08-05 RX ADMIN — PRAVASTATIN SODIUM 40 MG: 40 TABLET ORAL at 15:19

## 2022-08-05 RX ADMIN — DOCUSATE SODIUM 100 MG: 100 CAPSULE, LIQUID FILLED ORAL at 18:20

## 2022-08-05 RX ADMIN — FINASTERIDE 5 MG: 5 TABLET, FILM COATED ORAL at 10:34

## 2022-08-05 NOTE — TREATMENT PLAN
Hannah Diehl is a 16-year-old male seen earlier in hospitalization for bilateral hydronephrosis, secondary to prostatic obstruction, status post IR placement of bilateral percutaneous nephrostomy tubes  Patient underwent bone biopsy with results indicating prostate cancer  Discuss with medical-oncology provider, Babs Jacobson today  Plan:  Continue medical optimization  Maintain bilateral PCNs to straight drainage and flush per IR protocol  Begin Casodex  Patient will begin treatment with either Xtandi or Raza Katy and prednisone--defer management to our oncology colleagues  Follow-up with our service after rehab course is complete  No further  intervention indicated this hospital stay  Can be discharged at the discretion of the Internal Medicine service and pending case management arrangements for short-term rehab placement  Our service will contact patient/caregiver with hospital follow-up appointment date and time once discharged

## 2022-08-05 NOTE — PLAN OF CARE
Problem: Potential for Falls  Goal: Patient will remain free of falls  Description: INTERVENTIONS:  - Educate patient/family on patient safety including physical limitations  - Instruct patient to call for assistance with activity   - Consult OT/PT to assist with strengthening/mobility   - Keep Call bell within reach  - Keep bed low and locked with side rails adjusted as appropriate  - Keep care items and personal belongings within reach  - Initiate and maintain comfort rounds  - Make Fall Risk Sign visible to staff  - Offer Toileting every 2 Hours, in advance of need  - Initiate/Maintain alarm  - Obtain necessary fall risk management equipment  - Apply yellow socks and bracelet for high fall risk patients  - Consider moving patient to room near nurses station  Outcome: Progressing     Problem: SAFETY ADULT  Goal: Patient will remain free of falls  Description: INTERVENTIONS:  - Educate patient/family on patient safety including physical limitations  - Instruct patient to call for assistance with activity   - Consult OT/PT to assist with strengthening/mobility   - Keep Call bell within reach  - Keep bed low and locked with side rails adjusted as appropriate  - Keep care items and personal belongings within reach  - Initiate and maintain comfort rounds  - Make Fall Risk Sign visible to staff  - Offer Toileting every 2 Hours, in advance of need  - Initiate/Maintain alarm  - Obtain necessary fall risk management equipment  - Apply yellow socks and bracelet for high fall risk patients  - Consider moving patient to room near nurses station  Outcome: Progressing

## 2022-08-05 NOTE — ASSESSMENT & PLAN NOTE
Patient presents with gross hematuria x 1m  Had had prostate biopsy in past, last bx 10/2021, cytology negative  Follows with a urology in [address] [place] [address]    CT a/p 07/27: asymmetrically enlarged prostate gland; left side is larger  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  Bilateral hydronephrosis likely from obstruction at the UVJ secondary to enlarged/invading prostate  No renal stones  S/p B/l PCN by IR  Hold aspirin/nsaid   Urinary retention protocol  S/p 3 units prbc     Hemoglobin has since stabilized

## 2022-08-05 NOTE — CASE MANAGEMENT
Case Management Progress Note    Patient name Rock Graft  Location /-42 MRN [id number]  : 1950 Date 2022       LOS (days): 8  Geometric Mean LOS (GMLOS) (days): 3 50  Days to GMLOS:-4 4        OBJECTIVE:        Current admission status: Inpatient  Preferred Pharmacy:   [facility] [address] [address] [facility] [address]  Phone: [phone] Fax: [phone]    Primary Care Provider: Mindy Calvin    Primary Insurance: [employer]  Secondary Insurance:     PROGRESS NOTE:  CM received call from [facility] ([phone] ) at [facility] in the [place]  She reports she just spoke with daughter and she accepted their bed offer  They can admit with auth, negative COVID swab within 7 day, YVAN and proof of COVID vaccine  Facility NPI: [id number], tax ID: [id number], Dr Timothy Churchill, NPI: [id number]  CM let her know she'll follow up with family on the bed offer and will move forward with auth if they're accepting  CM was able to connect with dtr Ramona  She reports that she just got word of patient's dx from Heme/Onc and she more than ever feels he needs to remain in [place] for continuity of care as well as familial support, which he will not have in [place]  She is going to continue to try to call his insurance to advocate for STR in [place] and CM Agreed to f/u as well  CM was able to get in touch with Care Manager Mae Messina at [phone]  She reports she is not his Care Manager but directed CM to his care manager Artur Ball at [phone]  Mae Messina reports it's a [place] based plan only so she wouldn't think there'd be any in network facilities elsewhere  CM then called the customer service center at [phone] and spoke w/ Melissa  She also said it's a [place] based plan and there will be no OON providers around here   Melissa states that [facility] needs to call into the provider service line at [phone] and explain that he's from [place] but staying in [place] with dtr and there are no in network locations around here  She thinks the One time contract request was denied because they didn't realize he wasn't in state  CM relayed all of this to OO liaison Saida Cai and she agreed to have her insurance team f/u with them again to try and push for this auth  CM to f/u with patient/family and liaison on Monday  SLIM aware of dc barrier

## 2022-08-05 NOTE — TELEPHONE ENCOUNTER
Eleuterio Lima is a 67-year-old male seen in urologic consultation at [facility] for suspected metastatic prostate cancer  He is status post bone biopsy confirming adenocarcinoma of the prostate  He was evaluated by medical-oncology  Casodex was prescribed  He will be discharged for short-term rehab  And also follow up with medical-oncology outpatient begin systemic treatment  Please contact patient's daughter with non urgent follow-up in approximately 2 months  Patient by the way is status post bilateral percutaneous nephrostomies for bilateral ureteral obstruction/hydronephrosis

## 2022-08-05 NOTE — PROGRESS NOTES
Medical Oncology/Hematology Progress Note  Vic Mojica, male, 67 y o , 1950,  /-01, [id number]     Reason for admission: Acute blood loss anemia, MICA, bone metastasis  Reason for consultation: bone metastasis    ASSESSMENT AND PLAN:     1  Stage IV castration naive prostate cancer, mets to bone, lung   7/28: CT abd/pelvis showed below finding:  o Asymmetrically enlarged prostate gland; left side is larger     Bladder wall thickening likely hypertrophy however may represent cystitis in the appropriate clinical setting  Posterior aspect is invaded by the prostate gland  Bilateral hydronephrosis likely from obstruction at the UVJ secondary to enlarged/invading prostate  No renal stones  o 1 cm nodule in the right middle lobe not seen on the prior exam from 6/22/2020 concerning for metastasis  Trace bilateral effusions  o Diffuse sclerosis throughout the osseous pelvis and in the L3, L2, T12, T11, T10, and T8 vertebral bodies is concerning for metastasis  o There appears to be decreased signal adenopathy measuring 1 6 cm in the short axis (2:61)  Shotty periaortic adenopathy is noted     · 7/28: PSA was 135 7  7/27: CMP showed BUN 31, Cr 1 50, Alk phos 1,044, gianna calcium 9 3   Patient seen by urology during this hospitalization  S/P bilateral percutaneous nephrostomies   8/1: CT of chest evidence of metastatic disease in the chest including multiple pulmonary nodules, mediastinal lymphadenopathy, multiple osseous lesions  Bilateral pleural effusions left larger than right with bibasilar atelectasis including complete collapse of the left lower lobe   Status post IR bone biopsy  Pathology of left iliac crest showed metastatic adenocarcinoma consistent with prostate primary   Plan/Recommendations:  o I discussed with patient and daughter who was present in detail regarding finding of lung metastasis on CT of chest as well as results of bone biopsy    Results of biopsy are consistent with metastatic adenocarcinoma of the prostate  Patient and daughter understand that treatment will be of palliative intent  o Patient will have a bone scan once outpatient  I will arrange for this  o Discussed with urology  Patient will begin casodex today  Patient will be seen by urology shortly after discharge to start ADT   o Has appt with our office 8/31 with Dr Anuja Perez  Office is still working on obtaining sooner appointment  Likely will begin secondary anti hormonal manipulation such as Charolotte Gala, etc  Further discussion regarding details of treatment will be made at that appt  o Recommending palliative care consultation at least in outpatient setting  2  Acute blood loss anemia   3  Thrombocytopenia   · 8/1: WBC 9 52, Hgb 7 7, MCV 93, PLT 134K, diff normal   · Patient presented with gross hematuria   · Haptoglobin 360, hemolysis smear with helmet cells, schistocytes noted  Ferritin 239, iron 25, TIBC 292, iron saturation 9%  Folate normal   Vitamin B12 652  Kappa free light chain 34, lambda free light chain 27  Kappa/lambda ratio 1 25   · 8/3:  WBC 10, hemoglobin 7 5, MCV 91, platelets 055 K, absolute monocytes 1 42, other diff unrevealing  BMP showing normal kidney functions  Last CMP 7/27 normal bilirubin  · DIC panel:  D-dimer greater than 20, PTT 42, PT 15 3, FDP greater than 80 less than 160, plasminogen 77, fibrinogen 359, antithrombin 3 activity 90%, no schistocytes or helmet cells noted  Negative LEIA    Total bilirubin 0 65, direct bilirubin 0 15  SPEP negative  · Plan/Recommendations:  · Etiology of anemia is still secondary to acute blood loss from hematuria  However, will order DIC panel, hepatic function, LDH in setting of hemolysis smear showing schistocytes, helmet cells  Unlikely hemolysis since haptoglobin is normal, last bilirubin normal, hemoglobin and platelets are generally stable  · Continue management per urology     · Can consider Venofer 200mg EOD if continues to require pRBC consistently  · Transfuse if Hgb <7g/dL or symptomatic anemia     Patient understands and is in agreement with this plan  Thank you for the opportunity to participate in this patient's care  Interval History: Patient seen at bedside with daughter present  She provided translation for us today  He offers no specific complaints  He states he is not in any pain currently  He is understandably upset during our conversation regarding recent diagnosis  ECO    History of present illness: This is a 67year old [address] speaking male with a PMH of HTN, BPH who presented on  with weakness in upper and lower extremities  He also was unable to walk for 2 days  He had these symptoms ongoing for 1 month  He was following a urologist in [place] for hx of BPH  Daughter brought patient to [place] for second opinion  He was supposed to undergo a urological procedure  S/P prostate biopsy approx 9 months ago which was negative  In ED, he was found to have Hgb 6 6g/dL  Had gross hematuria as well as presentation   CT abd,pelvis:  IMPRESSION:     - Asymmetrically enlarged prostate gland; left side is larger     Bladder wall thickening likely hypertrophy however may represent cystitis in the appropriate clinical setting  Posterior aspect is invaded by the prostate gland  Bilateral hydronephrosis   likely from obstruction at the UVJ secondary to enlarged/invading prostate  No renal stones      - 1 cm nodule in the right middle lobe not seen on the prior exam from 2020 concerning for metastasis  Trace bilateral effusions      - Diffuse sclerosis throughout the osseous pelvis and in the L3, L2, T12, T11, T10, and T8 vertebral bodies is concerning for metastasis      - There appears to be decreased signal adenopathy measuring 1 6 cm in the short axis (2:61)  Shotty periaortic adenopathy is noted       : PSA was 135 7  : CMP showed BUN 31, Cr 1 50, Alk phos 1,044, gianna calcium 9 3      Labs today 8/1/22: Hgb 7 7, MCV 93, PLT 134K, diff normal      Urology saw patient, S/P IR placement of bilateral percutaneous nephrostomies  Review of Systems:   Review of Systems   Constitutional: Positive for activity change, appetite change, fatigue and unexpected weight change (16-20lbs loss in last few months )  Negative for chills, diaphoresis and fever  HENT: Negative for nosebleeds  Respiratory: Negative for apnea, cough, choking, chest tightness and shortness of breath  Cardiovascular: Negative for chest pain, palpitations and leg swelling  Gastrointestinal: Negative for abdominal distention, abdominal pain, anal bleeding, blood in stool and constipation  Endocrine: Negative for cold intolerance  Genitourinary: Negative for hematuria (Improvement)  Musculoskeletal: Positive for gait problem  Negative for arthralgias and back pain  Skin: Negative for color change, pallor, rash and wound  Neurological: Negative for dizziness, weakness, light-headedness and headaches  Hematological: Negative for adenopathy  Does not bruise/bleed easily  PHYSICAL EXAM:    /64   Pulse 85   Temp 98 7 °F (37 1 °C)   Resp 18   Ht 5' 10" (1 778 m)   Wt 95 2 kg (209 lb 14 1 oz)   SpO2 95%   BMI 30 11 kg/m²     Physical Exam  Constitutional:       General: He is not in acute distress  Appearance: Normal appearance  He is normal weight  He is not ill-appearing, toxic-appearing or diaphoretic  HENT:      Head: Normocephalic and atraumatic  Eyes:      General: No scleral icterus  Extraocular Movements: Extraocular movements intact  Conjunctiva/sclera: Conjunctivae normal    Pulmonary:      Effort: Pulmonary effort is normal  No respiratory distress  Abdominal:      General: Bowel sounds are normal       Tenderness: There is no abdominal tenderness  Comments: Bilateral nephrostomy tubes present with hematuria seen     Musculoskeletal:      Cervical back: Normal range of motion and neck supple  Right lower leg: No edema  Left lower leg: No edema  Skin:     General: Skin is warm and dry  Coloration: Skin is not jaundiced or pale  Findings: No bruising, erythema, lesion or rash  Neurological:      General: No focal deficit present  Mental Status: He is alert and oriented to person, place, and time  Mental status is at baseline  Motor: No weakness  Psychiatric:         Mood and Affect: Mood normal          Behavior: Behavior normal          Thought Content:  Thought content normal          Judgment: Judgment normal          LABS:     Recent Results (from the past 48 hour(s))   Platelet count    Collection Time: 08/03/22  4:18 PM   Result Value Ref Range    Platelets 049 600 - 877 Thousands/uL    MPV 12 4 8 9 - 12 7 fL   LD,Blood    Collection Time: 08/04/22  5:44 AM   Result Value Ref Range     (H) 81 - 234 U/L   Hepatic function panel    Collection Time: 08/04/22  5:44 AM   Result Value Ref Range    Total Bilirubin 0 65 0 20 - 1 00 mg/dL    Bilirubin, Direct 0 15 0 00 - 0 20 mg/dL    Alkaline Phosphatase 837 (H) 46 - 116 U/L    AST 37 5 - 45 U/L    ALT 13 12 - 78 U/L    Total Protein 6 1 (L) 6 4 - 8 4 g/dL    Albumin 2 4 (L) 3 5 - 5 0 g/dL   Direct antiglobulin test    Collection Time: 08/04/22  5:44 AM   Result Value Ref Range    DIRECT JENNIFER Negative    Fibrin split products    Collection Time: 08/04/22  5:44 AM   Result Value Ref Range    FDP >80 <160 (A) <10   Antithrombin III Activity    Collection Time: 08/04/22  5:44 AM   Result Value Ref Range    AntiThrombIN III Activity 90 (L) 92 - 136 % of Normal   Hemolysis Smear    Collection Time: 08/04/22  5:44 AM   Result Value Ref Range    Hemolysis Smear No Schistocytes or Helmet Cells noted    Plasminogen activity    Collection Time: 08/04/22  5:44 AM   Result Value Ref Range    Plasminogen 77 0 77 - 138 % of Normal   Fibrinogen    Collection Time: 08/04/22  5:44 AM   Result Value Ref Range    Fibrinogen 359 227 - 495 mg/dL   Platelet count    Collection Time: 08/04/22  5:44 AM   Result Value Ref Range    Platelets 388 937 - 087 Thousands/uL    MPV 11 7 8 9 - 12 7 fL   D-dimer, quantitative    Collection Time: 08/04/22  8:04 AM   Result Value Ref Range    D-Dimer, Quant >20 00 (H) <0 50 ug/ml FEU   APTT    Collection Time: 08/04/22  8:04 AM   Result Value Ref Range    PTT 42 (H) 23 - 37 seconds   Protime-INR    Collection Time: 08/04/22  8:04 AM   Result Value Ref Range    Protime 15 3 (H) 11 6 - 14 5 seconds    INR 1 24 (H) 0 84 - 1 19       CT abdomen pelvis wo contrast    Result Date: 7/28/2022  Narrative: CT ABDOMEN AND PELVIS WITHOUT IV CONTRAST INDICATION:   looking for metastatic prostate cancer  COMPARISON:  None  TECHNIQUE:  CT examination of the abdomen and pelvis was performed without intravenous contrast  Axial, sagittal, and coronal 2D reformatted images were created from the source data and submitted for interpretation  Radiation dose length product (DLP) for this visit:  687 mGy-cm   This examination, like all CT scans performed in the [facility], was performed utilizing techniques to minimize radiation dose exposure, including the use of iterative reconstruction and automated exposure control  Enteric contrast was not administered  FINDINGS: Lack of IV and oral contrast limits evaluation  ABDOMEN LOWER CHEST: 1 cm nodule in the right middle lobe not seen on the prior exam from 6/22/2020 concerning for metastasis  Trace bilateral effusions  LIVER/BILIARY TREE:  Unremarkable  GALLBLADDER:  No calcified gallstones  No pericholecystic inflammatory change  SPLEEN:  Unremarkable  PANCREAS:  Unremarkable  ADRENAL GLANDS:  Unremarkable  KIDNEYS/URETERS:  Unremarkable  Bilateral hydronephrosis  STOMACH AND BOWEL:  There is colonic diverticulosis without evidence of acute diverticulitis  APPENDIX:  A normal appendix was visualized   ABDOMINOPELVIC CAVITY:  No ascites  No pneumoperitoneum  There appears to be decreased signal adenopathy measuring 1 6 cm in the short axis (2:61)  Shotty periaortic adenopathy is noted    VESSELS:  Unremarkable for patient's age  PELVIS REPRODUCTIVE ORGANS:  Asymmetrically enlarged prostate gland; left side is larger  [place] URINARY BLADDER:  Bladder wall thickening likely hypertrophy however may represent cystitis in the appropriate clinical setting  Posterior aspect is invaded by the prostate gland    ABDOMINAL WALL/INGUINAL REGIONS:  Unremarkable  OSSEOUS STRUCTURES: Diffuse sclerosis throughout the osseous pelvis and in the L3, L2, T12, T11, T10, and T8 vertebral bodies is concerning for metastasis  No acute fracture  Impression: Asymmetrically enlarged prostate gland; left side is larger     Bladder wall thickening likely hypertrophy however may represent cystitis in the appropriate clinical setting  Posterior aspect is invaded by the prostate gland  Bilateral hydronephrosis likely from obstruction at the UVJ secondary to enlarged/invading prostate  No renal stones  1 cm nodule in the right middle lobe not seen on the prior exam from 6/22/2020 concerning for metastasis  Trace bilateral effusions  Diffuse sclerosis throughout the osseous pelvis and in the L3, L2, T12, T11, T10, and T8 vertebral bodies is concerning for metastasis  There appears to be decreased signal adenopathy measuring 1 6 cm in the short axis (2:61)  Shotty periaortic adenopathy is noted    Workstation performed: [id number]     XR chest 1 view portable    Result Date: 7/28/2022  Narrative: CHEST INDICATION:  Weakness  COMPARISON:  6/19/2020, CT chest 6/22/2020 EXAM PERFORMED/VIEWS:  XR CHEST PORTABLE FINDINGS: Cardiomediastinal silhouette appears unremarkable  The lungs are grossly clear, noting limited evaluation of the left retrocardiac region  No pneumothorax  Question mottled density of the right scapula       Impression: Limited chest with no acute cardiopulmonary disease  Question mottled density of the right scapula  Workstation performed: [id number]     XR shoulder 2+ vw right    Result Date: 7/28/2022  Narrative: RIGHT SHOULDER INDICATION:   right shoulder pain  COMPARISON:  None VIEWS:  XR SHOULDER 2+ VW RIGHT Images: 3 FINDINGS: Mottled sclerotic changes throughout the scapula with irregularity of the glenoid articular surface  Mild degenerative changes at the glenohumeral joint and acromioclavicular joint with some mild undersurface bony spurring  Mild sclerotic changes in several ribs Mild soft tissue prominence right upper arm  Visualized right lung is clear  Impression: Mottled sclerotic changes in the scapula, concerning for bony metastatic disease Workstation performed: [id number]     IR nephrostomy tube placement    Result Date: 7/29/2022  Narrative: Antegrade pyelogram and bilateral nephrostomy tube placement Clinical History:  Hematuria, anemia, enlarged prostate with bladder outlet obstruction and bilateral hydronephrosis Contrast:  20 mL of iohexol (OMNIPAQUE) Fluoro time: 4MIN 12SEC Radiation dose:  112 mGy Number of Images:  4 Conscious sedation time:  General Anesthesia The patient was placed prone on the table and the bilateral flanks were prepped and draped in the usual sterile fashion  After local anesthesia was administered to the skin, an 18 gauge needle was advanced under direct ultrasound guidance into a posterior lower pole calyx of the left kidney  A 0 018 wire was advanced through the needle, and the needle was removed  The Accustick coaxial dilator was inserted over the wire, and the internal portions including the wire were removed  Contrast was injected through the outer dilator, an antegrade pyelogram was performed  Intervention: The outer dilator was removed over a heavy-duty wire  After tract dilatation, a 10 [place] nephrostomy tube was advanced over the wire until the loop was formed within the renal pelvis   The catheter was then locked in place  The catheter was  sutured at the skin  Contrast was injected, confirming satisfactory location of the tube  The tube was then connected to gravity bag, and dressed sterilely  This same process was performed of the right kidney with placement of a right 10 Persian nephrostomy tube  Impression: Impression: Successful placement of bilateral 10 Persian nephrostomy tubes into bilateral moderately hydronephrotic renal collecting systems  Plan: Tubes to bag drainage  Flush twice a day in the hospital and q d  at home with 10 cc normal saline solution and return in 2 months for routine catheter changes  These tubes will be left in place until the patient's enlarged prostate is treated  Workstation performed: [id number]     US kidney and bladder    Result Date: 7/31/2022  Narrative: RENAL ULTRASOUND INDICATION:   omar  Abnormal CT scan which demonstrated bilateral hydronephrosis  Status post bilateral nephrostomy tubes placed 3 days ago  COMPARISON: 7/28/2022 TECHNIQUE:   Ultrasound of the retroperitoneum was performed with a curvilinear transducer utilizing volumetric sweeps and still imaging techniques  FINDINGS: KIDNEYS: Symmetric and normal size  Right kidney:  10 8 x 5 6 x 4 7 cm  Volume 146 9 mL Left kidney:  10 5 x 3 3 x 3 4 cm  Volume 61 8 mL Right kidney Normal echogenicity and contour  No mass is identified  No hydronephrosis  No shadowing calculi  No perinephric fluid collections  Left kidney Normal echogenicity and contour  No mass is identified  No hydronephrosis  No shadowing calculi  No perinephric fluid collections  URETERS: Nonvisualized  BLADDER: Only mild to moderate bladder distention  No focal thickening or mass lesions  Bilateral ureteral jets detected  Impression: No hydronephrosis   Workstation performed: [id number]         HISTORY:    Past Medical History:   Diagnosis Date    Gout     Hypertension        Past Surgical History:   Procedure Laterality Date    IR BIOPSY BONE  8/2/2022    IR NEPHROSTOMY TUBE PLACEMENT  7/28/2022       Family History   Problem Relation Age of Onset    Diabetes Mother     Hypertension Mother     Mental illness Mother     Heart attack Son     Heart attack Daughter        Social History     Socioeconomic History    Marital status: Single     Spouse name: None    Number of children: None    Years of education: None    Highest education level: None   Occupational History    None   Tobacco Use    Smoking status: Former Smoker     Types: Cigarettes    Smokeless tobacco: Never Used    Tobacco comment: quit 24yrs ago   Vaping Use    Vaping Use: Never used   Substance and Sexual Activity    Alcohol use:  Yes    Drug use: Never    Sexual activity: None   Other Topics Concern    None   Social History Narrative    None     Social Determinants of Health     Financial Resource Strain: Not on file   Food Insecurity: Not on file   Transportation Needs: Not on file   Physical Activity: Not on file   Stress: Not on file   Social Connections: Not on file   Intimate Partner Violence: Not on file   Housing Stability: Mehul Higginbotham Unable to Pay for Housing in the Last Year: No    Number of [place] in the Last Year: 2    Unstable Housing in the Last Year: No         Current Facility-Administered Medications:     acetaminophen (TYLENOL) tablet 650 mg, 650 mg, Oral, Q6H PRN, Baokim, PA-C, 650 mg at 07/29/22 1151    allopurinol (ZYLOPRIM) tablet 300 mg, 300 mg, Oral, Daily, Baokim, PA-C, 300 mg at 08/05/22 1035    bisacodyl (DULCOLAX) rectal suppository 10 mg, 10 mg, Rectal, Daily PRN, Ion Luevano MD    cefTRIAXone (ROCEPHIN) IVPB (premix in dextrose) 1,000 mg 50 mL, 1,000 mg, Intravenous, Q24H, Ion Luevano MD, Last Rate: 100 mL/hr at 08/05/22 1153, 1,000 mg at 08/05/22 1153    docusate sodium (COLACE) capsule 100 mg, 100 mg, Oral, BID, Rodney Roy PA-C, 100 mg at 08/05/22 1038    finasteride (PROSCAR) tablet 5 mg, 5 mg, Oral, Daily, Brian French PA-C, 5 mg at 08/05/22 1034    metoprolol succinate (TOPROL-XL) 24 hr tablet 100 mg, 100 mg, Oral, Daily, Brian French PA-C, 100 mg at 08/05/22 1034    oxyCODONE (ROXICODONE) IR tablet 5 mg, 5 mg, Oral, Q6H PRN, Matt Radford PA-C, 5 mg at 08/04/22 2985    polyethylene glycol (MIRALAX) packet 17 g, 17 g, Oral, Daily, Ion Luevano MD, 17 g at 08/05/22 1035    pravastatin (PRAVACHOL) tablet 40 mg, 40 mg, Oral, Daily With Brenda Mcgrath PA-C, 40 mg at 08/04/22 1803    tamsulosin (FLOMAX) capsule 0 4 mg, 0 4 mg, Oral, Daily With Dinner, TRICIA Malik, 0 4 mg at 08/04/22 1803    Medications Prior to Admission   Medication    allopurinol (ZYLOPRIM) 100 mg tablet    allopurinol (ZYLOPRIM) 300 mg tablet    aspirin 81 mg chewable tablet    fluticasone (FLONASE) 50 mcg/act nasal spray    metoprolol succinate (TOPROL-XL) 50 mg 24 hr tablet    tamsulosin (FLOMAX) 0 4 mg    brompheniramine-pseudoephedrine-DM 30-2-10 MG/5ML syrup    lisinopril (ZESTRIL) 10 mg tablet    methylPREDNISolone 4 MG tablet therapy pack    multivitamin (THERAGRAN) TABS    oxybutynin (DITROPAN) 5 mg tablet    pantoprazole (PROTONIX) 40 mg tablet    promethazine-codeine (PHENERGAN WITH CODEINE) 6 25-10 mg/5 mL syrup    simvastatin (ZOCOR) 10 mg tablet       No Known Allergies    Labs and pertinent reports reviewed  This note has been generated by voice recognition software system  Therefore, there may be spelling, grammar, and or syntax errors  Please contact if questions arise

## 2022-08-05 NOTE — CASE MANAGEMENT
Case Management Progress Note    Patient name Jack Ann  Location /-13 MRN [id number]  : 1950 Date 2022       LOS (days): 8  Geometric Mean LOS (GMLOS) (days): 3 50  Days to GMLOS:-4 3        OBJECTIVE:        Current admission status: Inpatient  Preferred Pharmacy:   [facility] [address]  [address] [facility] [address]  Phone: [phone] Fax: [phone]    Primary Care Provider: TRICIA Jones    Primary Insurance: MEDICARE MISC REPLACEMENT  Secondary Insurance:     PROGRESS NOTE:  CM called geraldine Greene to confirm she did receive fax w/ proof of patient's 3rd booster shot from his pharmacy  She is going to have her family go to patient's apt this weekend and look for confirmation of his first two doses  She's also going to try and call patient's insurance herself today to advocate for the one time contract with [address] [place] [address]

## 2022-08-05 NOTE — PROGRESS NOTES
[address] [facility]  Progress Note - Clive Sanches 1950, 67 y o  male MRN: [id number]  Unit/Bed#: -Bowen Encounter: [id number]  Primary Care Provider: TRICIA Segura   Date and time admitted to hospital: 7/27/2022 11:40 PM    * Acute blood loss anemia  Assessment & Plan  Hgb 6 6 on admission  Last hgb 13 on labs from 10/2021  Has assoc fatigue   Likely 2/2 hematuria   Hematuria since resolved  Status post 3 units packed red blood cells  Has since stabilized at approximately 8    MICA (acute kidney injury) ([facility] [address] )  Assessment & Plan  · Secondary to by hydronephrosis  · Presented creatinine 1 5  · Resolved with ivf    Right shoulder pain  Assessment & Plan  Limited mobility due to pain  Worse with shoulder extension  XR R shoulder   Tylenol prn it    Metastasis of unknown origin [facility])  Assessment & Plan  CT a/p 07/27: revealed 1 cm nodule in the right middle lobe not seen on the prior exam from 6/22/2020 concerning for metastasis and diffuse sclerosis throughout the osseous pelvis and in the L3, L2, T12, T11, T10, and T8 vertebral bodies also concerning for metastasis   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 as well  Prostate likely source   Status post IR guided biopsy  Will have outpatient follow-up with Oncology    Gross hematuria  Assessment & Plan  Patient presents with gross hematuria x 1m  Had had prostate biopsy in past, last bx 10/2021, cytology negative  Follows with a urology in [address] [place] [address]    CT a/p 07/27: asymmetrically enlarged prostate gland; left side is larger  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  Bilateral hydronephrosis likely from obstruction at the UVJ secondary to enlarged/invading prostate  No renal stones  S/p B/l PCN by IR  Hold aspirin/nsaid   Urinary retention protocol  S/p 3 units prbc     Hemoglobin has since stabilized    Generalized weakness  Assessment & Plan  PT/OT is recommending rehab        VTE Pharmacologic Prophylaxis: Pharmacologic: Heparin  Mechanical VTE Prophylaxis in Place: Yes    Patient Centered Rounds: I have performed bedside rounds with nursing staff today  Discussions with Specialists or Other Care Team Provider: cm, nursing    Education and Discussions with Family / Patient: pt    Time Spent for Care: 30 minutes  More than 50% of total time spent on counseling and coordination of care as described above  Current Length of Stay: 8 day(s)    Current Patient Status: Inpatient   Certification Statement: The patient will continue to require additional inpatient hospital stay due to See below    Discharge Plan:  Medically clear waiting rehab placement     Code Status: Level 1 - Full Code      Subjective:   Denies chest pain, shortness breath, cough, fevers, chills    Objective:     Vitals:   Temp (24hrs), Av 4 °F (36 9 °C), Min:98 °F (36 7 °C), Max:98 7 °F (37 1 °C)    Temp:  [98 °F (36 7 °C)-98 7 °F (37 1 °C)] 98 7 °F (37 1 °C)  HR:  [71-75] 75  Resp:  [16-18] 18  BP: (113-124)/(62-64) 124/64  SpO2:  [98 %] 98 %  Body mass index is 30 11 kg/m²  Input and Output Summary (last 24 hours): Intake/Output Summary (Last 24 hours) at 2022 1023  Last data filed at 2022 0601  Gross per 24 hour   Intake 20 ml   Output 1885 ml   Net -1865 ml       Physical Exam:     Physical Exam  Constitutional:       General: He is not in acute distress  Appearance: He is well-developed  He is not diaphoretic  HENT:      Head: Normocephalic and atraumatic  Nose: Nose normal       Mouth/Throat:      Pharynx: No oropharyngeal exudate  Eyes:      General: No scleral icterus  Conjunctiva/sclera: Conjunctivae normal    Cardiovascular:      Rate and Rhythm: Normal rate and regular rhythm  Heart sounds: Normal heart sounds  No murmur heard  No friction rub  No gallop  Pulmonary:      Effort: Pulmonary effort is normal  No respiratory distress  Breath sounds: Normal breath sounds   No wheezing or rales    Chest:      Chest wall: No tenderness  Abdominal:      General: Bowel sounds are normal  There is no distension  Palpations: Abdomen is soft  Tenderness: There is no abdominal tenderness  There is no guarding  Musculoskeletal:         General: No tenderness or deformity  Normal range of motion  Cervical back: Normal range of motion and neck supple  Skin:     General: Skin is warm and dry  Findings: No erythema  Neurological:      Mental Status: He is alert  Mental status is at baseline  Additional Data:     Labs:    Results from last 7 days   Lab Units 08/04/22  0544 08/03/22  1618 08/03/22  0531   WBC Thousand/uL  --   --  10 04   HEMOGLOBIN g/dL  --   --  7 5*   HEMATOCRIT %  --   --  22 7*   PLATELETS Thousands/uL 158   < > 146*   NEUTROS PCT %  --   --  66   LYMPHS PCT %  --   --  17   MONOS PCT %  --   --  14*   EOS PCT %  --   --  2    < > = values in this interval not displayed  Results from last 7 days   Lab Units 08/04/22  0544 08/03/22  0531   SODIUM mmol/L  --  133*   POTASSIUM mmol/L  --  4 8   CHLORIDE mmol/L  --  101   CO2 mmol/L  --  23   BUN mg/dL  --  17   CREATININE mg/dL  --  0 99   ANION GAP mmol/L  --  9   CALCIUM mg/dL  --  8 2*   ALBUMIN g/dL 2 4*  --    TOTAL BILIRUBIN mg/dL 0 65  --    ALK PHOS U/L 837*  --    ALT U/L 13  --    AST U/L 37  --    GLUCOSE RANDOM mg/dL  --  94     Results from last 7 days   Lab Units 08/04/22  0804   INR  1 24*                       * I Have Reviewed All Lab Data Listed Above  * Additional Pertinent Lab Tests Reviewed:  All Labs Within Last 24 Hours Reviewed    Imaging:    Imaging Reports Reviewed Today Include: na  Imaging Personally Reviewed by Myself Includes:  na    Recent Cultures (last 7 days):           Last 24 Hours Medication List:   Current Facility-Administered Medications   Medication Dose Route Frequency Provider Last Rate    acetaminophen  650 mg Oral Q6H BLANKA Avalos PA-C      allopurinol  300 mg Oral Daily Chandan Barroso PA-C      bisacodyl  10 mg Rectal Daily PRN Maty Cannon MD      cefTRIAXone  1,000 mg Intravenous Q24H Ion Luevano MD 1,000 mg (08/04/22 0834)    docusate sodium  100 mg Oral BID Martin Sommer PA-C      finasteride  5 mg Oral Daily [facility] [place], [place]      metoprolol succinate  100 mg Oral Daily Chandan [facility] [place]      oxyCODONE  5 mg Oral Q6H PRN Kelly Baer PA-C      polyethylene glycol  17 g Oral Daily Ion Luevano MD      pravastatin  40 mg Oral Daily With [employer], [place]      tamsulosin  0 4 mg Oral Daily With TRICIA Loredo          Today, Patient Was Seen By: Maty Cannon MD    ** Please Note: Dictation voice to text software may have been used in the creation of this document   **

## 2022-08-05 NOTE — CASE MANAGEMENT
Case Management Progress Note    Patient name Moiz Key  Location /-14 MRN [id number]  : 1950 Date 2022       LOS (days): 8  Geometric Mean LOS (GMLOS) (days): 3 50  Days to GMLOS:-4 3        OBJECTIVE:        Current admission status: Inpatient  Preferred Pharmacy:   [facility] [address]  [address] [facility] [address]  Phone: [phone] Fax: [phone]    Primary Care Provider: Paulo Rubinstein, CRNP    Primary Insurance: MEDICARE MISC REPLACEMENT  Secondary Insurance:     PROGRESS NOTE:  CM received call from [facility]; she reports they got an email from Deepti Macedo 6488 indicating there are local in network facilities and they want to know why patient isn't going to one of them  The insurance plan would also not tell OO the name of any of these facilities  There were no accepting facilities in the large blanket referral that CM sent; everyone indicated they were OON w/ patient's plan and he has no OON plans  [facility] was the only facility willing to go for a one time contract  CM reached out to [facility] at [phone], option 3, option 0 and spoke with rep Patricia Loera  She reports there's no history of an auth submission request from [employer] and advised CM call patient's Care Manager Kilotracey Jerrell at [phone] to ask which local facilities are supposed to be in network with his plan  CM called and LMOVM for [facility] and asked for call back ASA to review  CM reached out to patient's daughter Ramona to provide update re: dc  She understands insurance barriers and reports she's actually been trying to call them herself lately to get some information  She reports that the long term plan is for patient to remain with her, but if he has to go back to [place] for [address] d/t insurance restrictions, then they'll have to be ok with it   She does give permission for CM to send blanket referral to [facility] from [facility] [address] as well and asked that CM keep her updated on any progress with the insurance, OO, or [place] options  She understands that transportation will likely be expensive to get patient back to [place] if we have to arrange it, but that we can assist with arrangements and the SW at the facility could work to get him back to [place] at the completion of his program     CM provided SLIM and OO liaison Mabel with an update and expanded the [address] referral to include [place] facilities as well  CM will continue to follow

## 2022-08-06 LAB
ANION GAP SERPL CALCULATED.3IONS-SCNC: 9 MMOL/L (ref 4–13)
BASOPHILS # BLD AUTO: 0.02 THOUSANDS/ÂΜL (ref 0–0.1)
BASOPHILS NFR BLD AUTO: 0 % (ref 0–1)
BUN SERPL-MCNC: 16 MG/DL (ref 5–25)
CALCIUM SERPL-MCNC: 9 MG/DL (ref 8.3–10.1)
CHLORIDE SERPL-SCNC: 99 MMOL/L (ref 96–108)
CO2 SERPL-SCNC: 26 MMOL/L (ref 21–32)
CREAT SERPL-MCNC: 0.92 MG/DL (ref 0.6–1.3)
EOSINOPHIL # BLD AUTO: 0.24 THOUSAND/ÂΜL (ref 0–0.61)
EOSINOPHIL NFR BLD AUTO: 3 % (ref 0–6)
ERYTHROCYTE [DISTWIDTH] IN BLOOD BY AUTOMATED COUNT: 15.3 % (ref 11.6–15.1)
GFR SERPL CREATININE-BSD FRML MDRD: 82 ML/MIN/1.73SQ M
GLUCOSE SERPL-MCNC: 96 MG/DL (ref 65–140)
HCT VFR BLD AUTO: 24.9 % (ref 36.5–49.3)
HGB BLD-MCNC: 8.1 G/DL (ref 12–17)
IMM GRANULOCYTES # BLD AUTO: 0.06 THOUSAND/UL (ref 0–0.2)
IMM GRANULOCYTES NFR BLD AUTO: 1 % (ref 0–2)
LYMPHOCYTES # BLD AUTO: 1.86 THOUSANDS/ÂΜL (ref 0.6–4.47)
LYMPHOCYTES NFR BLD AUTO: 20 % (ref 14–44)
MCH RBC QN AUTO: 29.6 PG (ref 26.8–34.3)
MCHC RBC AUTO-ENTMCNC: 32.5 G/DL (ref 31.4–37.4)
MCV RBC AUTO: 91 FL (ref 82–98)
MONOCYTES # BLD AUTO: 1.16 THOUSAND/ÂΜL (ref 0.17–1.22)
MONOCYTES NFR BLD AUTO: 13 % (ref 4–12)
NEUTROPHILS # BLD AUTO: 5.84 THOUSANDS/ÂΜL (ref 1.85–7.62)
NEUTS SEG NFR BLD AUTO: 63 % (ref 43–75)
NRBC BLD AUTO-RTO: 0 /100 WBCS
PLATELET # BLD AUTO: 217 THOUSANDS/UL (ref 149–390)
PMV BLD AUTO: 11.5 FL (ref 8.9–12.7)
POTASSIUM SERPL-SCNC: 4.7 MMOL/L (ref 3.5–5.3)
RBC # BLD AUTO: 2.74 MILLION/UL (ref 3.88–5.62)
SODIUM SERPL-SCNC: 134 MMOL/L (ref 135–147)
WBC # BLD AUTO: 9.18 THOUSAND/UL (ref 4.31–10.16)

## 2022-08-06 PROCEDURE — 99232 SBSQ HOSP IP/OBS MODERATE 35: CPT | Performed by: INTERNAL MEDICINE

## 2022-08-06 PROCEDURE — 80048 BASIC METABOLIC PNL TOTAL CA: CPT | Performed by: INTERNAL MEDICINE

## 2022-08-06 PROCEDURE — 85025 COMPLETE CBC W/AUTO DIFF WBC: CPT | Performed by: INTERNAL MEDICINE

## 2022-08-06 RX ADMIN — TAMSULOSIN HYDROCHLORIDE 0.4 MG: 0.4 CAPSULE ORAL at 17:11

## 2022-08-06 RX ADMIN — FINASTERIDE 5 MG: 5 TABLET, FILM COATED ORAL at 09:55

## 2022-08-06 RX ADMIN — CEFTRIAXONE 1000 MG: 1 INJECTION, SOLUTION INTRAVENOUS at 10:00

## 2022-08-06 RX ADMIN — POLYETHYLENE GLYCOL 3350 17 G: 17 POWDER, FOR SOLUTION ORAL at 09:55

## 2022-08-06 RX ADMIN — ALLOPURINOL 300 MG: 300 TABLET ORAL at 09:55

## 2022-08-06 RX ADMIN — DOCUSATE SODIUM 100 MG: 100 CAPSULE, LIQUID FILLED ORAL at 17:11

## 2022-08-06 RX ADMIN — METOPROLOL SUCCINATE 100 MG: 100 TABLET, EXTENDED RELEASE ORAL at 09:55

## 2022-08-06 RX ADMIN — BICALUTAMIDE 50 MG: 50 TABLET, FILM COATED ORAL at 09:56

## 2022-08-06 RX ADMIN — PRAVASTATIN SODIUM 40 MG: 40 TABLET ORAL at 17:11

## 2022-08-06 NOTE — PLAN OF CARE
Pt resting between care  No complaints of pain  Remains on RA  Afebrile  High Fall risk  Call bell within reach  Bed in lowest position  Nephrostomy tube dressing changed  Large output via R nephrostomy tube  Minimal output on L       Problem: Potential for Falls  Goal: Patient will remain free of falls  Description: INTERVENTIONS:  - Educate patient/family on patient safety including physical limitations  - Instruct patient to call for assistance with activity   - Consult OT/PT to assist with strengthening/mobility   - Keep Call bell within reach  - Keep bed low and locked with side rails adjusted as appropriate  - Keep care items and personal belongings within reach  - Initiate and maintain comfort rounds  - Make Fall Risk Sign visible to staff  - Offer Toileting every 2 Hours, in advance of need  - Initiate/Maintain alarm  - Obtain necessary fall risk management equipment  - Apply yellow socks and bracelet for high fall risk patients  - Consider moving patient to room near nurses station  Outcome: Progressing     Problem: PAIN - ADULT  Goal: Verbalizes/displays adequate comfort level or baseline comfort level  Description: Interventions:  - Encourage patient to monitor pain and request assistance  - Assess pain using appropriate pain scale  - Administer analgesics based on type and severity of pain and evaluate response  - Implement non-pharmacological measures as appropriate and evaluate response  - Consider cultural and social influences on pain and pain management  - Notify physician/advanced practitioner if interventions unsuccessful or patient reports new pain  Outcome: Progressing     Problem: INFECTION - ADULT  Goal: Absence or prevention of progression during hospitalization  Description: INTERVENTIONS:  - Assess and monitor for signs and symptoms of infection  - Monitor lab/diagnostic results  - Monitor all insertion sites, i e  indwelling lines, tubes, and drains  - Monitor endotracheal if appropriate and nasal secretions for changes in amount and color  - [place] appropriate cooling/warming therapies per order  - Administer medications as ordered  - Instruct and encourage patient and family to use good hand hygiene technique  - Identify and instruct in appropriate isolation precautions for identified infection/condition  Outcome: Progressing  Goal: Absence of fever/infection during neutropenic period  Description: INTERVENTIONS:  - Monitor WBC    Outcome: Progressing     Goal: Maintain or return to baseline ADL function  Description: INTERVENTIONS:  - Educate patient/family on patient safety including physical limitations  - Instruct patient to call for assistance with activity   - Consult OT/PT to assist with strengthening/mobility   - Keep Call bell within reach  - Keep bed low and locked with side rails adjusted as appropriate  - Keep care items and personal belongings within reach  - Initiate and maintain comfort rounds  - Make Fall Risk Sign visible to staff  - Offer Toileting every 2 Hours, in advance of need  - Initiate/Maintain alarm  - Obtain necessary fall risk management equipment  - Apply yellow socks and bracelet for high fall risk patients  - Consider moving patient to room near nurses station  Outcome: Progressing       Problem: Prexisting or High Potential for Compromised Skin Integrity  Goal: Skin integrity is maintained or improved  Description: INTERVENTIONS:  - Identify patients at risk for skin breakdown  - Assess and monitor skin integrity  - Assess and monitor nutrition and hydration status  - Monitor labs   - Assess for incontinence   - Turn and reposition patient  - Assist with mobility/ambulation  - Relieve pressure over bony prominences  - Avoid friction and shearing  - Provide appropriate hygiene as needed including keeping skin clean and dry  - Evaluate need for skin moisturizer/barrier cream  - Collaborate with interdisciplinary team   - Patient/family teaching  - Consider wound care consult   Outcome: Progressing     Problem: Nutrition/Hydration-ADULT  Goal: Nutrient/Hydration intake appropriate for improving, restoring or maintaining nutritional needs  Description: Monitor and assess patient's nutrition/hydration status for malnutrition  Collaborate with interdisciplinary team and initiate plan and interventions as ordered  Monitor patient's weight and dietary intake as ordered or per policy  Utilize nutrition screening tool and intervene as necessary  Determine patient's food preferences and provide high-protein, high-caloric foods as appropriate       INTERVENTIONS:  - Monitor oral intake, urinary output, labs, and treatment plans  - Assess nutrition and hydration status and recommend course of action  - Evaluate amount of meals eaten  - Assist patient with eating if necessary   - Allow adequate time for meals  - Recommend/ encourage appropriate diets, oral nutritional supplements, and vitamin/mineral supplements  - Order, calculate, and assess calorie counts as needed  - Recommend, monitor, and adjust tube feedings based on assessed needs  - Assess need for intravenous fluids  - Provide nutrition/hydration education as appropriate  - Include patient/family/caregiver in decisions related to nutrition  Outcome: Progressing

## 2022-08-06 NOTE — ASSESSMENT & PLAN NOTE
Patient presents with gross hematuria x 1m  Had had prostate biopsy in past, last bx 10/2021, cytology negative  Follows with a urology in [address] [place] [address]    CT a/p 07/27: asymmetrically enlarged prostate gland; left side is larger  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  Bilateral hydronephrosis likely from obstruction at the UVJ secondary to enlarged/invading prostate  No renal stones  S/p B/l PCN by IR  Hold aspirin/nsaid   Urinary retention protocol  S/p 3 units prbc     Hemoglobin has since stabilized

## 2022-08-06 NOTE — PROGRESS NOTES
[facility]  Progress Note - Moiz Key 1950, 67 y o  male MRN: [id number]  Unit/Bed#: -Bowen Encounter: [id number]  Primary Care Provider: Paulo Rubinstein, CRNP   Date and time admitted to hospital: 7/27/2022 11:40 PM    * Acute blood loss anemia  Assessment & Plan  Hgb 6 6 on admission  Last hgb 13 on labs from 10/2021  Has assoc fatigue   Likely 2/2 hematuria   Hematuria since resolved  Status post 3 units packed red blood cells  Has since stabilized at approximately 8    MICA (acute kidney injury) ([facility] [address] )  Assessment & Plan  · Secondary to by hydronephrosis  · Presented creatinine 1 5  · Resolved with ivf    Right shoulder pain  Assessment & Plan  Limited mobility due to pain  Worse with shoulder extension  XR R shoulder   Tylenol prn it    Metastasis of unknown origin [facility])  Assessment & Plan  CT a/p 07/27: revealed 1 cm nodule in the right middle lobe not seen on the prior exam from 6/22/2020 concerning for metastasis and diffuse sclerosis throughout the osseous pelvis and in the L3, L2, T12, T11, T10, and T8 vertebral bodies also concerning for metastasis   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 as well  Prostate likely source   Status post IR guided biopsy  Will have outpatient follow-up with Oncology    Gross hematuria  Assessment & Plan  Patient presents with gross hematuria x 1m  Had had prostate biopsy in past, last bx 10/2021, cytology negative  Follows with a urology in [address] [place] [address]    CT a/p 07/27: asymmetrically enlarged prostate gland; left side is larger  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  Bilateral hydronephrosis likely from obstruction at the UVJ secondary to enlarged/invading prostate  No renal stones  S/p B/l PCN by IR  Hold aspirin/nsaid   Urinary retention protocol  S/p 3 units prbc     Hemoglobin has since stabilized    Generalized weakness  Assessment & Plan  PT/OT is recommending rehab        VTE Pharmacologic Prophylaxis: Pharmacologic: Pharmacologic VTE Prophylaxis contraindicated due to anemia  Mechanical VTE Prophylaxis in Place: Yes    Patient Centered Rounds: I have performed bedside rounds with nursing staff today  Discussions with Specialists or Other Care Team Provider: cm, nursing    Education and Discussions with Family / Patient: pt    Time Spent for Care: 30 minutes  More than 50% of total time spent on counseling and coordination of care as described above  Current Length of Stay: 9 day(s)    Current Patient Status: Inpatient   Certification Statement: The patient will continue to require additional inpatient hospital stay due to See below    Discharge Plan:  Medically clear waiting placement    Code Status: Level 1 - Full Code      Subjective:   Denies chest pain, shortness breatht, cough fevers    Objective:     Vitals:   Temp (24hrs), Av 4 °F (36 9 °C), Min:98 °F (36 7 °C), Max:98 9 °F (37 2 °C)    Temp:  [98 °F (36 7 °C)-98 9 °F (37 2 °C)] 98 °F (36 7 °C)  HR:  [69-80] 71  Resp:  [16-19] 16  BP: (106-144)/(65-70) 144/69  SpO2:  [97 %-99 %] 98 %  Body mass index is 30 08 kg/m²  Input and Output Summary (last 24 hours): Intake/Output Summary (Last 24 hours) at 2022 1111  Last data filed at 2022 0900  Gross per 24 hour   Intake 740 ml   Output 2285 ml   Net -1545 ml       Physical Exam:     Physical Exam  Constitutional:       General: He is not in acute distress  Appearance: He is well-developed  He is not diaphoretic  HENT:      Head: Normocephalic and atraumatic  Nose: Nose normal       Mouth/Throat:      Pharynx: No oropharyngeal exudate  Eyes:      General: No scleral icterus  Conjunctiva/sclera: Conjunctivae normal    Cardiovascular:      Rate and Rhythm: Normal rate and regular rhythm  Heart sounds: Normal heart sounds  No murmur heard  No friction rub  No gallop  Pulmonary:      Effort: Pulmonary effort is normal  No respiratory distress        Breath sounds: Normal breath sounds  No wheezing or rales  Chest:      Chest wall: No tenderness  Abdominal:      General: Bowel sounds are normal  There is no distension  Palpations: Abdomen is soft  Tenderness: There is no abdominal tenderness  There is no guarding  Musculoskeletal:         General: No tenderness or deformity  Normal range of motion  Cervical back: Normal range of motion and neck supple  Skin:     General: Skin is warm and dry  Findings: No erythema  Neurological:      Mental Status: He is alert  Mental status is at baseline  (   Additional Data:     Labs:    Results from last 7 days   Lab Units 08/06/22  0933   WBC Thousand/uL 9 18   HEMOGLOBIN g/dL 8 1*   HEMATOCRIT % 24 9*   PLATELETS Thousands/uL 217   NEUTROS PCT % 63   LYMPHS PCT % 20   MONOS PCT % 13*   EOS PCT % 3     Results from last 7 days   Lab Units 08/06/22  0501 08/04/22  0544   SODIUM mmol/L 134*  --    POTASSIUM mmol/L 4 7  --    CHLORIDE mmol/L 99  --    CO2 mmol/L 26  --    BUN mg/dL 16  --    CREATININE mg/dL 0 92  --    ANION GAP mmol/L 9  --    CALCIUM mg/dL 9 0  --    ALBUMIN g/dL  --  2 4*   TOTAL BILIRUBIN mg/dL  --  0 65   ALK PHOS U/L  --  837*   ALT U/L  --  13   AST U/L  --  37   GLUCOSE RANDOM mg/dL 96  --      Results from last 7 days   Lab Units 08/04/22  0804   INR  1 24*                       * I Have Reviewed All Lab Data Listed Above  * Additional Pertinent Lab Tests Reviewed:  All Labs Within Last 24 Hours Reviewed    Imaging:    Imaging Reports Reviewed Today Include: na  Imaging Personally Reviewed by Myself Includes:  na    Recent Cultures (last 7 days):           Last 24 Hours Medication List:   Current Facility-Administered Medications   Medication Dose Route Frequency Provider Last Rate    acetaminophen  650 mg Oral Q6H PRN Chandan Barroso PA-C      allopurinol  300 mg Oral Daily Jovi Reyes      bicalutamide  50 mg Oral Daily TRICIA Khan     [facility] bisacodyl  10 mg Rectal Daily PRN Ion Luevano MD      cefTRIAXone  1,000 mg Intravenous Q24H Ion Luevano MD 1,000 mg (08/06/22 1000)    docusate sodium  100 mg Oral BID Jennifer Cummings PA-C      finasteride  5 mg Oral Daily [place], [place]      metoprolol succinate  100 mg Oral Daily [place], [place]      oxyCODONE  5 mg Oral Q6H PRN Ирина Prince PA-C      polyethylene glycol  17 g Oral Daily Ion Luevano MD      pravastatin  40 mg Oral Daily With [place], [place]      tamsulosin  0 4 mg Oral Daily With TRICIA Loredo          Today, Patient Was Seen By: Adriel Workman MD    ** Please Note: Dictation voice to text software may have been used in the creation of this document   **

## 2022-08-07 PROBLEM — C79.51 PROSTATE CANCER METASTATIC TO BONE (HCC): Status: ACTIVE | Noted: 2022-07-28

## 2022-08-07 PROBLEM — C61 PROSTATE CANCER METASTATIC TO BONE (HCC): Status: ACTIVE | Noted: 2022-07-28

## 2022-08-07 PROCEDURE — 99232 SBSQ HOSP IP/OBS MODERATE 35: CPT | Performed by: INTERNAL MEDICINE

## 2022-08-07 RX ORDER — HEPARIN SODIUM 5000 [USP'U]/ML
5000 INJECTION, SOLUTION INTRAVENOUS; SUBCUTANEOUS EVERY 8 HOURS SCHEDULED
Status: DISCONTINUED | OUTPATIENT
Start: 2022-08-08 | End: 2022-08-12 | Stop reason: HOSPADM

## 2022-08-07 RX ADMIN — ALLOPURINOL 300 MG: 300 TABLET ORAL at 09:49

## 2022-08-07 RX ADMIN — DOCUSATE SODIUM 100 MG: 100 CAPSULE, LIQUID FILLED ORAL at 17:20

## 2022-08-07 RX ADMIN — TAMSULOSIN HYDROCHLORIDE 0.4 MG: 0.4 CAPSULE ORAL at 17:20

## 2022-08-07 RX ADMIN — METOPROLOL SUCCINATE 100 MG: 100 TABLET, EXTENDED RELEASE ORAL at 09:49

## 2022-08-07 RX ADMIN — PRAVASTATIN SODIUM 40 MG: 40 TABLET ORAL at 17:20

## 2022-08-07 RX ADMIN — FINASTERIDE 5 MG: 5 TABLET, FILM COATED ORAL at 09:49

## 2022-08-07 RX ADMIN — BICALUTAMIDE 50 MG: 50 TABLET, FILM COATED ORAL at 09:57

## 2022-08-07 RX ADMIN — DOCUSATE SODIUM 100 MG: 100 CAPSULE, LIQUID FILLED ORAL at 09:49

## 2022-08-07 NOTE — ASSESSMENT & PLAN NOTE
· Likely from hematuria   · Required 3 units PRBC transfusion during current admission  · Hemoglobin stable above 7, monitor for now

## 2022-08-07 NOTE — PLAN OF CARE
Problem: Potential for Falls  Goal: Patient will remain free of falls  Description: INTERVENTIONS:  - Educate patient/family on patient safety including physical limitations  - Instruct patient to call for assistance with activity   - Consult OT/PT to assist with strengthening/mobility   - Keep Call bell within reach  - Keep bed low and locked with side rails adjusted as appropriate  - Keep care items and personal belongings within reach  - Initiate and maintain comfort rounds  - Make Fall Risk Sign visible to staff  - Offer Toileting every 2 Hours, in advance of need  - Initiate/Maintain alarm  - Obtain necessary fall risk management equipment  - Apply yellow socks and bracelet for high fall risk patients  - Consider moving patient to room near nurses station  Outcome: Progressing     Problem: PAIN - ADULT  Goal: Verbalizes/displays adequate comfort level or baseline comfort level  Description: Interventions:  - Encourage patient to monitor pain and request assistance  - Assess pain using appropriate pain scale  - Administer analgesics based on type and severity of pain and evaluate response  - Implement non-pharmacological measures as appropriate and evaluate response  - Consider cultural and social influences on pain and pain management  - Notify physician/advanced practitioner if interventions unsuccessful or patient reports new pain  Outcome: Progressing

## 2022-08-07 NOTE — ASSESSMENT & PLAN NOTE
· CT a/p 07/27: revealed 1 cm nodule in the right middle lobe not seen on the prior exam from 6/22/2020 concerning for metastasis and diffuse sclerosis throughout the osseous pelvis and in the L3, L2, T12, T11, T10, and T8 vertebral bodies also concerning for metastasis   · 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 as well  · Status post IR guided biopsy  · Biopsy revealed prostate cancer  · Will have outpatient follow-up with Oncology

## 2022-08-07 NOTE — DISCHARGE SUMMARY
[address] [facility]  Discharge- Nadia Leon 1950, 67 y o  male MRN: [id number]  Unit/Bed#: -01 Encounter: [id number]  Primary Care Provider: TRICIA Louis   Date and time admitted to hospital: 7/27/2022 11:40 PM    * Acute blood loss anemia  Assessment & Plan  Hgb 6 6 on admission  Last hgb 13 on labs from 10/2021  Has assoc fatigue   Likely 2/2 hematuria   Hematuria since resolved  Status post 3 units packed red blood cells  Has since stabilized at approximately 8    MICA (acute kidney injury) [facility])  Assessment & Plan  · Secondary to by hydronephrosis  · Presented creatinine 1 5  · Resolved with ivf    Prostate cancer metastatic to bone [facility])  Assessment & Plan  CT a/p 07/27: revealed 1 cm nodule in the right middle lobe not seen on the prior exam from 6/22/2020 concerning for metastasis and diffuse sclerosis throughout the osseous pelvis and in the L3, L2, T12, T11, T10, and T8 vertebral bodies also concerning for metastasis   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 as well  Status post IR guided biopsy  Biopsy revealed prostate cancer  Will have outpatient follow-up with Oncology    Generalized weakness  Assessment & Plan  PT/OT recommending rehab      VTE Pharmacologic Prophylaxis:   Pharmacologic: Heparin  Mechanical VTE Prophylaxis in Place: Yes    Patient Centered Rounds: I have performed bedside rounds with nursing staff today  Discussions with Specialists or Other Care Team Provider: cm, nursing    Education and Discussions with Family / Patient: opt    Time Spent for Care: 30 minutes  More than 50% of total time spent on counseling and coordination of care as described above      Current Length of Stay: 10 day(s)    Current Patient Status: Inpatient   Certification Statement: The patient will continue to require additional inpatient hospital stay due to See below    Discharge Plan:  Medically clear awaiting rehab placement    Code Status: Level 1 - Full Code      Subjective:   Denies chest pain, cough, fevers, chills    Objective:     Vitals:   Temp (24hrs), Av 1 °F (37 3 °C), Min:99 °F (37 2 °C), Max:99 1 °F (37 3 °C)    Temp:  [99 °F (37 2 °C)-99 1 °F (37 3 °C)] 99 1 °F (37 3 °C)  HR:  [67-79] 67  Resp:  [16-19] 16  BP: (140-142)/(63-69) 140/63  SpO2:  [92 %-98 %] 98 %  Body mass index is 29 8 kg/m²  Input and Output Summary (last 24 hours): Intake/Output Summary (Last 24 hours) at 2022 0805  Last data filed at 2022 0602  Gross per 24 hour   Intake 840 ml   Output 2440 ml   Net -1600 ml       Physical Exam:     Physical Exam  Constitutional:       General: He is not in acute distress  Appearance: He is well-developed  He is not diaphoretic  HENT:      Head: Normocephalic and atraumatic  Nose: Nose normal       Mouth/Throat:      Pharynx: No oropharyngeal exudate  Eyes:      General: No scleral icterus  Conjunctiva/sclera: Conjunctivae normal    Cardiovascular:      Rate and Rhythm: Normal rate and regular rhythm  Heart sounds: Normal heart sounds  No murmur heard  No friction rub  No gallop  Pulmonary:      Effort: Pulmonary effort is normal  No respiratory distress  Breath sounds: Normal breath sounds  No wheezing or rales  Chest:      Chest wall: No tenderness  Abdominal:      General: Bowel sounds are normal  There is no distension  Palpations: Abdomen is soft  Tenderness: There is no abdominal tenderness  There is no guarding  Musculoskeletal:         General: No tenderness or deformity  Normal range of motion  Cervical back: Normal range of motion and neck supple  Skin:     General: Skin is warm and dry  Findings: No erythema  Neurological:      Mental Status: He is alert and oriented to person, place, and time             Additional Data:     Labs:    Results from last 7 days   Lab Units 22  0933   WBC Thousand/uL 9 18   HEMOGLOBIN g/dL 8 1*   HEMATOCRIT % 24 9* PLATELETS Thousands/uL 217   NEUTROS PCT % 63   LYMPHS PCT % 20   MONOS PCT % 13*   EOS PCT % 3     Results from last 7 days   Lab Units 08/06/22  0501 08/04/22  0544   SODIUM mmol/L 134*  --    POTASSIUM mmol/L 4 7  --    CHLORIDE mmol/L 99  --    CO2 mmol/L 26  --    BUN mg/dL 16  --    CREATININE mg/dL 0 92  --    ANION GAP mmol/L 9  --    CALCIUM mg/dL 9 0  --    ALBUMIN g/dL  --  2 4*   TOTAL BILIRUBIN mg/dL  --  0 65   ALK PHOS U/L  --  837*   ALT U/L  --  13   AST U/L  --  37   GLUCOSE RANDOM mg/dL 96  --      Results from last 7 days   Lab Units 08/04/22  0804   INR  1 24*                       * I Have Reviewed All Lab Data Listed Above  * Additional Pertinent Lab Tests Reviewed:  All Labs Within Last 24 Hours Reviewed    Imaging:    Imaging Reports Reviewed Today Include: na  Imaging Personally Reviewed by Myself Includes:  na    Recent Cultures (last 7 days):           Last 24 Hours Medication List:   Current Facility-Administered Medications   Medication Dose Route Frequency Provider Last Rate    acetaminophen  650 mg Oral Q6H PRN [employer]      allopurinol  300 mg Oral Daily An[employer] [place], [place]      bicalutamide  50 mg Oral Daily TRICIA Brown      bisacodyl  10 mg Rectal Daily PRN Rabia Lester MD      cefTRIAXone  1,000 mg Intravenous Q24H Ion Luevnao MD 1,000 mg (08/06/22 1000)    docusate sodium  100 mg Oral BID Galina Bowens PA-C      finasteride  5 mg Oral Daily [place]      metoprolol succinate  100 mg Oral Daily [place] [place]      oxyCODONE  5 mg Oral Q6H PRN Debi Albarado PA-C      polyethylene glycol  17 g Oral Daily Ion Luevano MD      pravastatin  40 mg Oral Daily With [place], [place]      tamsulosin  0 4 mg Oral Daily With TRICIA Loredo          Today, Patient Was Seen By: Rabia Lester MD    ** Please Note: Dictation voice to text software may have been used in the creation of this document   **

## 2022-08-07 NOTE — PROGRESS NOTES
[address] [facility]  Progress Note - Jack Ann 1950, 67 y o  male MRN: [id number]  Unit/Bed#: -[place] Encounter: [id number]  Primary Care Provider: TRICIA Jones   Date and time admitted to hospital: 7/27/2022 11:40 PM      * Acute blood loss anemia  Assessment & Plan  Hgb 6 6 on admission  Last hgb 13 on labs from 10/2021  Has assoc fatigue   Likely 2/2 hematuria   Hematuria since resolved  Status post 3 units packed red blood cells  Has since stabilized at approximately 8     MICA (acute kidney injury) ([facility] [address] )  Assessment & Plan  · Secondary to by hydronephrosis  · Presented creatinine 1 5  · Resolved with ivf     Prostate cancer metastatic to bone [facility])  Assessment & Plan  CT a/p 07/27: revealed 1 cm nodule in the right middle lobe not seen on the prior exam from 6/22/2020 concerning for metastasis and diffuse sclerosis throughout the osseous pelvis and in the L3, L2, T12, T11, T10, and T8 vertebral bodies also concerning for metastasis   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 as well  Status post IR guided biopsy  Biopsy revealed prostate cancer  Will have outpatient follow-up with Oncology     Generalized weakness  Assessment & Plan  PT/OT recommending rehab        VTE Pharmacologic Prophylaxis:   Pharmacologic: Heparin  Mechanical VTE Prophylaxis in Place: Yes     Patient Centered Rounds: I have performed bedside rounds with nursing staff today      Discussions with Specialists or Other Care Team Provider: cm, nursing     Education and Discussions with Family / Patient: opt     Time Spent for Care: 30 minutes    More than 50% of total time spent on counseling and coordination of care as described above      Current Length of Stay: 10 day(s)     Current Patient Status: Inpatient   Certification Statement: The patient will continue to require additional inpatient hospital stay due to See below     Discharge Plan:  Medically clear awaiting rehab placement     Code Status: Level 1 - Full Code        Subjective:   Denies chest pain, cough, fevers, chills     Objective:      Vitals:   Temp (24hrs), Av 1 °F (37 3 °C), Min:99 °F (37 2 °C), Max:99 1 °F (37 3 °C)     Temp:  [99 °F (37 2 °C)-99 1 °F (37 3 °C)] 99 1 °F (37 3 °C)  HR:  [67-79] 67  Resp:  [16-19] 16  BP: (140-142)/(63-69) 140/63  SpO2:  [92 %-98 %] 98 %  Body mass index is 29 8 kg/m²       Input and Output Summary (last 24 hours):         Intake/Output Summary (Last 24 hours) at 2022 0805  Last data filed at 2022 0602      Gross per 24 hour   Intake 840 ml   Output 2440 ml   Net -1600 ml         Physical Exam:      Physical Exam  Constitutional:       General: He is not in acute distress  Appearance: He is well-developed  He is not diaphoretic  HENT:      Head: Normocephalic and atraumatic  Nose: Nose normal       Mouth/Throat:      Pharynx: No oropharyngeal exudate  Eyes:      General: No scleral icterus  Conjunctiva/sclera: Conjunctivae normal    Cardiovascular:      Rate and Rhythm: Normal rate and regular rhythm  Heart sounds: Normal heart sounds  No murmur heard  No friction rub  No gallop  Pulmonary:      Effort: Pulmonary effort is normal  No respiratory distress  Breath sounds: Normal breath sounds  No wheezing or rales  Chest:      Chest wall: No tenderness  Abdominal:      General: Bowel sounds are normal  There is no distension  Palpations: Abdomen is soft  Tenderness: There is no abdominal tenderness  There is no guarding  Musculoskeletal:         General: No tenderness or deformity  Normal range of motion  Cervical back: Normal range of motion and neck supple  Skin:     General: Skin is warm and dry  Findings: No erythema     Neurological:      Mental Status: He is alert and oriented to person, place, and time              Additional Data:      Labs:          Results from last 7 days   Lab Units 22  0933   WBC Thousand/uL 9 18   HEMOGLOBIN g/dL 8 1*   HEMATOCRIT % 24 9*   PLATELETS Thousands/uL 217   NEUTROS PCT % 63   LYMPHS PCT % 20   MONOS PCT % 13*   EOS PCT % 3            Results from last 7 days   Lab Units 08/06/22  0501 08/04/22  0544   SODIUM mmol/L 134*  --    POTASSIUM mmol/L 4 7  --    CHLORIDE mmol/L 99  --    CO2 mmol/L 26  --    BUN mg/dL 16  --    CREATININE mg/dL 0 92  --    ANION GAP mmol/L 9  --    CALCIUM mg/dL 9 0  --    ALBUMIN g/dL  --  2 4*   TOTAL BILIRUBIN mg/dL  --  0 65   ALK PHOS U/L  --  837*   ALT U/L  --  13   AST U/L  --  37   GLUCOSE RANDOM mg/dL 96  --            Results from last 7 days   Lab Units 08/04/22  0804   INR   1 24*                           * I Have Reviewed All Lab Data Listed Above  * Additional Pertinent Lab Tests Reviewed:  All Labs Within Last 24 Hours Reviewed     Imaging:     Imaging Reports Reviewed Today Include: na  Imaging Personally Reviewed by Myself Includes:  na     Recent Cultures (last 7 days):             Last 24 Hours Medication List:            Current Facility-Administered Medications   Medication Dose Route Frequency Provider Last Rate    acetaminophen  650 mg Oral Q6H PRN Paulino Malloy PA-C      allopurinol  300 mg Oral Daily [place], [place]      bicalutamide  50 mg Oral Daily TRICIA Oneal      bisacodyl  10 mg Rectal Daily PRN Rae Goddard MD      cefTRIAXone  1,000 mg Intravenous Q24H Ion Luevano MD 1,000 mg (08/06/22 1000)    docusate sodium  100 mg Oral BID Srikanth Walker PA-C      finasteride  5 mg Oral Daily [place], [place]      metoprolol succinate  100 mg Oral Daily [place], [place]      oxyCODONE  5 mg Oral Q6H PRN Adriana Menendez PA-C      polyethylene glycol  17 g Oral Daily Ion Luevano MD      pravastatin  40 mg Oral Daily With [place], [place]      tamsulosin  0 4 mg Oral Daily With TRICIA Loredo           Today, Patient Was Seen By: Rae Goddard MD     ** Please Note: Dictation voice to text software may have been used in the creation of this document   **

## 2022-08-08 PROBLEM — N17.9 AKI (ACUTE KIDNEY INJURY) (HCC): Status: RESOLVED | Noted: 2022-07-28 | Resolved: 2022-08-08

## 2022-08-08 LAB
ANION GAP SERPL CALCULATED.3IONS-SCNC: 9 MMOL/L (ref 4–13)
BASOPHILS # BLD AUTO: 0.03 THOUSANDS/ÂΜL (ref 0–0.1)
BASOPHILS NFR BLD AUTO: 0 % (ref 0–1)
BUN SERPL-MCNC: 25 MG/DL (ref 5–25)
CALCIUM SERPL-MCNC: 8.9 MG/DL (ref 8.3–10.1)
CHLORIDE SERPL-SCNC: 98 MMOL/L (ref 96–108)
CO2 SERPL-SCNC: 26 MMOL/L (ref 21–32)
CREAT SERPL-MCNC: 0.9 MG/DL (ref 0.6–1.3)
EOSINOPHIL # BLD AUTO: 0.18 THOUSAND/ÂΜL (ref 0–0.61)
EOSINOPHIL NFR BLD AUTO: 2 % (ref 0–6)
ERYTHROCYTE [DISTWIDTH] IN BLOOD BY AUTOMATED COUNT: 15.5 % (ref 11.6–15.1)
GFR SERPL CREATININE-BSD FRML MDRD: 84 ML/MIN/1.73SQ M
GLUCOSE SERPL-MCNC: 95 MG/DL (ref 65–140)
HCT VFR BLD AUTO: 25.2 % (ref 36.5–49.3)
HGB BLD-MCNC: 7.9 G/DL (ref 12–17)
IMM GRANULOCYTES # BLD AUTO: 0.09 THOUSAND/UL (ref 0–0.2)
IMM GRANULOCYTES NFR BLD AUTO: 1 % (ref 0–2)
LYMPHOCYTES # BLD AUTO: 1.95 THOUSANDS/ÂΜL (ref 0.6–4.47)
LYMPHOCYTES NFR BLD AUTO: 19 % (ref 14–44)
MCH RBC QN AUTO: 28.7 PG (ref 26.8–34.3)
MCHC RBC AUTO-ENTMCNC: 31.3 G/DL (ref 31.4–37.4)
MCV RBC AUTO: 92 FL (ref 82–98)
MONOCYTES # BLD AUTO: 1.2 THOUSAND/ÂΜL (ref 0.17–1.22)
MONOCYTES NFR BLD AUTO: 12 % (ref 4–12)
NEUTROPHILS # BLD AUTO: 6.63 THOUSANDS/ÂΜL (ref 1.85–7.62)
NEUTS SEG NFR BLD AUTO: 66 % (ref 43–75)
NRBC BLD AUTO-RTO: 0 /100 WBCS
PLATELET # BLD AUTO: 249 THOUSANDS/UL (ref 149–390)
PMV BLD AUTO: 11.3 FL (ref 8.9–12.7)
POTASSIUM SERPL-SCNC: 4.4 MMOL/L (ref 3.5–5.3)
RBC # BLD AUTO: 2.75 MILLION/UL (ref 3.88–5.62)
SODIUM SERPL-SCNC: 133 MMOL/L (ref 135–147)
WBC # BLD AUTO: 10.08 THOUSAND/UL (ref 4.31–10.16)

## 2022-08-08 PROCEDURE — 80048 BASIC METABOLIC PNL TOTAL CA: CPT | Performed by: INTERNAL MEDICINE

## 2022-08-08 PROCEDURE — 85025 COMPLETE CBC W/AUTO DIFF WBC: CPT | Performed by: INTERNAL MEDICINE

## 2022-08-08 PROCEDURE — 97535 SELF CARE MNGMENT TRAINING: CPT

## 2022-08-08 PROCEDURE — 97116 GAIT TRAINING THERAPY: CPT

## 2022-08-08 PROCEDURE — 97530 THERAPEUTIC ACTIVITIES: CPT

## 2022-08-08 PROCEDURE — 99232 SBSQ HOSP IP/OBS MODERATE 35: CPT | Performed by: STUDENT IN AN ORGANIZED HEALTH CARE EDUCATION/TRAINING PROGRAM

## 2022-08-08 RX ADMIN — PRAVASTATIN SODIUM 40 MG: 40 TABLET ORAL at 16:34

## 2022-08-08 RX ADMIN — TAMSULOSIN HYDROCHLORIDE 0.4 MG: 0.4 CAPSULE ORAL at 16:34

## 2022-08-08 RX ADMIN — ALLOPURINOL 300 MG: 300 TABLET ORAL at 10:24

## 2022-08-08 RX ADMIN — METOPROLOL SUCCINATE 100 MG: 100 TABLET, EXTENDED RELEASE ORAL at 10:24

## 2022-08-08 RX ADMIN — HEPARIN SODIUM 5000 UNITS: 5000 INJECTION INTRAVENOUS; SUBCUTANEOUS at 22:30

## 2022-08-08 RX ADMIN — FINASTERIDE 5 MG: 5 TABLET, FILM COATED ORAL at 10:25

## 2022-08-08 RX ADMIN — BICALUTAMIDE 50 MG: 50 TABLET, FILM COATED ORAL at 10:25

## 2022-08-08 RX ADMIN — DOCUSATE SODIUM 100 MG: 100 CAPSULE, LIQUID FILLED ORAL at 10:24

## 2022-08-08 RX ADMIN — HEPARIN SODIUM 5000 UNITS: 5000 INJECTION INTRAVENOUS; SUBCUTANEOUS at 06:50

## 2022-08-08 RX ADMIN — HEPARIN SODIUM 5000 UNITS: 5000 INJECTION INTRAVENOUS; SUBCUTANEOUS at 14:00

## 2022-08-08 RX ADMIN — DOCUSATE SODIUM 100 MG: 100 CAPSULE, LIQUID FILLED ORAL at 17:03

## 2022-08-08 NOTE — OCCUPATIONAL THERAPY NOTE
Occupational Therapy Treatment Note      Juan Jang    8/8/2022    Patient Active Problem List   Diagnosis    Shortness of breath    Prolonged Q-T interval on ECG    Acute respiratory failure with hypoxia (HCC) secondary to presumed COVID-19 infection    Suspected COVID-19 virus infection    Hypertension    Encounter to establish care    Medicare annual wellness visit, subsequent    Generalized weakness    Need for vaccination    Cough    Sinus congestion    Gross hematuria    Prostate cancer metastatic to bone ([address] )    Acute blood loss anemia    Right shoulder pain    MICA (acute kidney injury) [facility])       Past Medical History:   Diagnosis Date    Gout     Hypertension        Past Surgical History:   Procedure Laterality Date    IR BIOPSY BONE  8/2/2022    IR NEPHROSTOMY TUBE PLACEMENT  7/28/2022 08/08/22 0944   OT Last Visit   OT Visit Date 08/08/22   Note Type   Note Type Treatment   Restrictions/Precautions   Weight Bearing Precautions Per Order No   Other Precautions Multiple lines; Fall Risk;Limb alert  (Yor[place] speaking, nephrostomy tubes)   General   Response to Previous Treatment Patient with no complaints from previous session   Lifestyle   Autonomy Per chart review, patient requires assistance with ADLs/IADLs, ambulatory with SPC, and lives with daughter in a two story house   Reciprocal Relationships Supportive Family   Service to Others Retired   Pain Assessment   Pain Assessment Tool 0-10   Pain Score No Pain   ADL   Where Assessed Chair   Grooming Assistance 5  Supervision/Setup   Grooming Deficit Setup;Verbal cueing; Increased time to complete   UB Bathing Assistance 5  Supervision/Setup   UB Bathing Deficit Setup;Verbal cueing;Supervision/safety; Increased time to complete   LB Bathing Assistance 3  Moderate Assistance   LB Bathing Deficit Setup;Steadying;Verbal cueing;Supervision/safety; Increased time to complete; Buttocks;Right lower leg including foot; Left lower leg including foot   UB Dressing Assistance 4  Minimal Assistance   UB Dressing Deficit Setup;Verbal cueing;Supervision/safety; Increased time to complete;Pull around back; Fasteners   UB Dressing Comments Pt [facility] gown  LB Dressing Assistance 1  Total Assistance  (For socks only)   LB Dressing Deficit Don/doff R sock; Don/doff L sock   Bed Mobility   Supine to Sit 3  Moderate assistance   Additional items Assist x 1;HOB elevated; Bedrails; Increased time required;Verbal cues;LE management   Sit to Supine   (DNT: pt seated OOB in recliner at end of session)   Additional Comments Pt denied lightheaded/dizziness with transitional movements   Transfers   Sit to Stand 4  Minimal assistance   Additional items Assist x 2;Bedrails; Increased time required;Verbal cues   Stand to Sit 4  Minimal assistance   Additional items Assist x 2;Armrests; Increased time required;Verbal cues   Additional Comments Verbal cues provided throughout session for proper hand placement for transitional movements   Functional Mobility   Functional Mobility 4  Minimal assistance  (Assist of 2)   Additional Comments Patient ambulated to/from bathroom with assist of 2 for safety  Pt noted to have mild SOB with mobility but SpO2 >90% throughout  Additional items Rolling walker   Cognition   Overall Cognitive Status WFL   Arousal/Participation Alert; Responsive; Cooperative   Attention Attends with cues to redirect   Orientation Level Oriented X4   Memory Within functional limits   Following Commands Follows one step commands with increased time or repetition   Comments Pt agreeable to OT session  Activity Tolerance   Activity Tolerance Patient limited by fatigue   Medical Staff Made Aware This session, pt required and most appropriately benefited from skilled OT/PT co-treat due to physical assistance of two therapists, significant regression from functional baseline and decreased activity tolerance     Assessment   Assessment Patient participated in Skilled OT session this date with interventions consisting of ADL re training with the use of correct body mechnaics,  therapeutic activities to: increase activity tolerance and increase dynamic sit/ stand balance during functional activity    Patient agreeable to OT treatment session, upon arrival patient was found supine in bed and in no apparent distress  Patient completed bed mobility with mod assist of 1 and functional transfers with min assist of 2  Pt ambulated to/from bathroom with min assist of 2 utilizing RW  While seated on chair, pt required sup -min assist for UB ADLs and mod-total assist for LB ADLs  In comparison to previous session, patient with improvements in functional transfers and LB bathing  Patient requiring one step directives, frequent rest periods and ocassional safety reminders  Patient continues to be functioning below baseline level, occupational performance remains limited secondary to factors listed above and increased risk for falls and injury  From OT standpoint, recommendation at time of d/c would be Post acute rehabilitation services  Patient to benefit from continued Occupational Therapy treatment while in the hospital to address deficits as defined above and maximize level of functional independence with ADLs and functional mobility  Plan   Treatment Interventions ADL retraining;Functional transfer training; Endurance training;Patient/family training   Goal Expiration Date 08/13/22   OT Treatment Day 2   OT Frequency 3-5x/wk   Recommendation   OT Discharge Recommendation Post acute rehabilitation services   Additional Comments  At end of session, pt seated OOB in recliner with all needs met  Additional Comments 2 The patient's raw score on the AM-PAC Daily Activity inpatient short form is 13, standardized score is 32 03, less than 39 4  Patients at this level are likely to benefit from discharge to post-acute rehabilitation services   Please refer to the recommendation of the Occupational Therapist for safe discharge planning     AM-PAC Daily Activity Inpatient   Lower Body Dressing 1   Bathing 2   Toileting 1   Upper Body Dressing 3   Grooming 3   Eating 3   Daily Activity Raw Score 13   Daily Activity Standardized Score (Calc for Raw Score >=11) 32 03   AM-PAC Applied Cognition Inpatient   Following a Speech/Presentation 3   Understanding Ordinary Conversation 3   Taking Medications 3   Remembering Where Things Are Placed or Put Away 3   Remembering List of 4-5 Errands 3   Taking Care of Complicated Tasks 3   Applied Cognition Raw Score 18   Applied Cognition Standardized Score 38 07     Hoda Lane, OT

## 2022-08-08 NOTE — PLAN OF CARE
Pt resting comfortably  No complaints of pain overnight  Loose BM last night x1  Assist x1 to stand and pivot to [facility]  Generalized weakness and slightly unsteady gait  B/L nephrostomy tube care provided  Large output via R, and minimal output via L nephrostomy tube        Problem: Potential for Falls  Goal: Patient will remain free of falls  Description: INTERVENTIONS:  - Educate patient/family on patient safety including physical limitations  - Instruct patient to call for assistance with activity   - Consult OT/PT to assist with strengthening/mobility   - Keep Call bell within reach  - Keep bed low and locked with side rails adjusted as appropriate  - Keep care items and personal belongings within reach  - Initiate and maintain comfort rounds  - Make Fall Risk Sign visible to staff  - Offer Toileting every 2 Hours, in advance of need  - Initiate/Maintain alarm  - Obtain necessary fall risk management equipment  - Apply yellow socks and bracelet for high fall risk patients  - Consider moving patient to room near nurses station  Outcome: Progressing     Problem: PAIN - ADULT  Goal: Verbalizes/displays adequate comfort level or baseline comfort level  Description: Interventions:  - Encourage patient to monitor pain and request assistance  - Assess pain using appropriate pain scale  - Administer analgesics based on type and severity of pain and evaluate response  - Implement non-pharmacological measures as appropriate and evaluate response  - Consider cultural and social influences on pain and pain management  - Notify physician/advanced practitioner if interventions unsuccessful or patient reports new pain  Outcome: Progressing     Problem: INFECTION - ADULT  Goal: Absence or prevention of progression during hospitalization  Description: INTERVENTIONS:  - Assess and monitor for signs and symptoms of infection  - Monitor lab/diagnostic results  - Monitor all insertion sites, i e  indwelling lines, tubes, and drains  - Monitor endotracheal if appropriate and nasal secretions for changes in amount and color  - [place] appropriate cooling/warming therapies per order  - Administer medications as ordered  - Instruct and encourage patient and family to use good hand hygiene technique  - Identify and instruct in appropriate isolation precautions for identified infection/condition  Outcome: Progressing  Goal: Absence of fever/infection during neutropenic period  Description: INTERVENTIONS:  - Monitor WBC    Problem: HEMATOLOGIC - ADULT  Goal: Maintains hematologic stability  Description: INTERVENTIONS  - Assess for signs and symptoms of bleeding or hemorrhage  - Monitor labs  - Administer supportive blood products/factors as ordered and appropriate  Outcome: Progressing

## 2022-08-08 NOTE — PLAN OF CARE
Problem: OCCUPATIONAL THERAPY ADULT  Goal: Performs self-care activities at highest level of function for planned discharge setting  See evaluation for individualized goals  Description: Treatment Interventions: ADL retraining, Functional transfer training, UE strengthening/ROM, Endurance training, Patient/family training, Compensatory technique education, Energy conservation, Activityengagement          See flowsheet documentation for full assessment, interventions and recommendations  Note: Limitation: Decreased ADL status, Decreased UE ROM, Decreased UE strength, Decreased endurance, Decreased self-care trans, Decreased high-level ADLs  Prognosis: Fair  Assessment: Patient participated in Skilled OT session this date with interventions consisting of ADL re training with the use of correct body mechnaics,  therapeutic activities to: increase activity tolerance and increase dynamic sit/ stand balance during functional activity    Patient agreeable to OT treatment session, upon arrival patient was found supine in bed and in no apparent distress  Patient completed bed mobility with mod assist of 1 and functional transfers with min assist of 2  Pt ambulated to/from bathroom with min assist of 2 utilizing RW  While seated on chair, pt required sup -min assist for UB ADLs and mod-total assist for LB ADLs  In comparison to previous session, patient with improvements in functional transfers and LB bathing  Patient requiring one step directives, frequent rest periods and ocassional safety reminders  Patient continues to be functioning below baseline level, occupational performance remains limited secondary to factors listed above and increased risk for falls and injury  From OT standpoint, recommendation at time of d/c would be Post acute rehabilitation services     Patient to benefit from continued Occupational Therapy treatment while in the hospital to address deficits as defined above and maximize level of functional independence with ADLs and functional mobility       OT Discharge Recommendation: Post acute rehabilitation services     Piter Mckinnon, OT

## 2022-08-08 NOTE — PLAN OF CARE
Problem: PHYSICAL THERAPY ADULT  Goal: Performs mobility at highest level of function for planned discharge setting  See evaluation for individualized goals  Description: Treatment/Interventions: LE strengthening/ROM, Functional transfer training, Therapeutic exercise, Endurance training, Patient/family training, Gait training, Compensatory technique education, Bed mobility, Spoke to nursing, OT          See flowsheet documentation for full assessment, interventions and recommendations  Outcome: Progressing  Note: Prognosis: Good  Problem List: Decreased strength, Impaired balance, Decreased endurance, Decreased mobility, Decreased cognition, Pain  Assessment: Pt seen for PT treatment session this date with interventions consisting of gait training w/ emphasis on improving pt's ability to ambulate level surfaces x x25' then 36' with min A provided by therapist with RW, Therapeutic exercise consisting of: LE exercises in seated position and therapeutic activity consisting of training: bed mobility, supine<>sit transfers, sit<>stand transfers and vc and tactile cues for static standing posture faciliation  Pt agreeable to PT treatment session upon arrival, pt found supine in bed w/ HOB elevated, in no apparent distress and responsive  In comparison to previous session, pt with improvements in LE strength, balance and mobility  Post session: pt returned back to recliner, chair alarm engaged, all needs in reach and RN notified of session findings/recommendations  Continue to recommend post acute rehabilitation services at time of d/c in order to maximize pt's functional independence and safety w/ mobility  Pt continues to be functioning below baseline level, and remains limited 2* factors listed above and including continued need for medical management and monitoring, decreased strength and balance resulting in an increased risk for falls, decreased endurance and activity tolerance limiting overall mobility   PT will continue to see pt during current hospitalization in order to address the deficits listed above and provide interventions consistent w/ POC in effort to achieve STGs  Barriers to Discharge: Decreased caregiver support (decline in functional status)     PT Discharge Recommendation: Post acute rehabilitation services    See flowsheet documentation for full assessment

## 2022-08-08 NOTE — PROGRESS NOTES
[facility]  Progress Note - Miguel Craven 1950, 67 y o  male MRN: [id number]  Unit/Bed#: -[place] Encounter: [id number]  Primary Care Provider: TRICIA Cruz   Date and time admitted to hospital: 7/27/2022 11:40 PM    * Acute blood loss anemia  Assessment & Plan  · Likely from hematuria   · Required 3 units PRBC transfusion during current admission  · Hemoglobin stable above 7, monitor for now     Prostate cancer metastatic to bone [facility])  Assessment & Plan  · CT a/p 07/27: revealed 1 cm nodule in the right middle lobe not seen on the prior exam from 6/22/2020 concerning for metastasis and diffuse sclerosis throughout the osseous pelvis and in the L3, L2, T12, T11, T10, and T8 vertebral bodies also concerning for metastasis   · 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 as well  · Status post IR guided biopsy  · Biopsy revealed prostate cancer  · Will have outpatient follow-up with Oncology    Gross hematuria  Assessment & Plan  · Patient presents with gross hematuria x 1m  Had had prostate biopsy in past, last bx 10/2021, cytology negative  Follows with a urology in [address] [place] [address]    · CT a/p 07/27: asymmetrically enlarged prostate gland; left side is larger  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  Bilateral hydronephrosis likely from obstruction at the UVJ secondary to enlarged/invading prostate  No renal stones  · Status post bilateral percutaneous nephrostomy placement  · Hematuria resolving  · Hemoglobin is still low, but stable above 7    Generalized weakness  Assessment & Plan  · PT/OT recommending rehab  · Pending authorization     MICA (acute kidney injury) (HCC)-resolved as of 8/8/2022  Assessment & Plan  · Secondary to by hydronephrosis  · Presented creatinine 1 5  · Resolved with ivf        VTE Pharmacologic Prophylaxis: VTE Score: 3 Moderate Risk (Score 3-4) - Pharmacological DVT Prophylaxis Ordered: heparin      Patient Centered Rounds: I performed bedside rounds with nursing staff today  Discussions with Specialists or Other Care Team Provider: lisa        Time Spent for Care: 30 minutes  More than 50% of total time spent on counseling and coordination of care as described above  Current Length of Stay: 11 day(s)  Current Patient Status: Inpatient   Certification Statement: The patient will continue to require additional inpatient hospital stay due to placement  Discharge Plan: day to day    Code Status: Level 1 - Full Code    Subjective:   No chest pain or chest palpitations  No shortness of breath  Appetite is good  Objective:     Vitals:   Temp (24hrs), Av 3 °F (36 8 °C), Min:97 3 °F (36 3 °C), Max:99 1 °F (37 3 °C)    Temp:  [97 3 °F (36 3 °C)-99 1 °F (37 3 °C)] 97 3 °F (36 3 °C)  HR:  [70-75] 75  Resp:  [17] 17  BP: (122-146)/(65-71) 122/65  SpO2:  [98 %-99 %] 98 %  Body mass index is 29 83 kg/m²  Input and Output Summary (last 24 hours): Intake/Output Summary (Last 24 hours) at 2022 1050  Last data filed at 2022 0917  Gross per 24 hour   Intake 920 ml   Output 1425 ml   Net -505 ml       Physical Exam:   Physical Exam  Constitutional:       General: He is not in acute distress  Appearance: Normal appearance  He is not toxic-appearing  Cardiovascular:      Rate and Rhythm: Normal rate and regular rhythm  Heart sounds: Normal heart sounds  No murmur heard  Pulmonary:      Effort: Pulmonary effort is normal  No respiratory distress  Breath sounds: Normal breath sounds  No wheezing  Abdominal:      General: Abdomen is flat  There is no distension  Palpations: Abdomen is soft  Tenderness: There is no abdominal tenderness  Neurological:      General: No focal deficit present  Mental Status: He is alert  Mental status is at baseline  Motor: No weakness            Additional Data:     Labs:  Results from last 7 days   Lab Units 22  0625   WBC Thousand/uL 10 08   HEMOGLOBIN g/dL 7 9*   HEMATOCRIT % 25 2*   PLATELETS Thousands/uL 249   NEUTROS PCT % 66   LYMPHS PCT % 19   MONOS PCT % 12   EOS PCT % 2     Results from last 7 days   Lab Units 08/08/22  0625 08/06/22  0501 08/04/22  0544   SODIUM mmol/L 133*   < >  --    POTASSIUM mmol/L 4 4   < >  --    CHLORIDE mmol/L 98   < >  --    CO2 mmol/L 26   < >  --    BUN mg/dL 25   < >  --    CREATININE mg/dL 0 90   < >  --    ANION GAP mmol/L 9   < >  --    CALCIUM mg/dL 8 9   < >  --    ALBUMIN g/dL  --   --  2 4*   TOTAL BILIRUBIN mg/dL  --   --  0 65   ALK PHOS U/L  --   --  837*   ALT U/L  --   --  13   AST U/L  --   --  37   GLUCOSE RANDOM mg/dL 95   < >  --     < > = values in this interval not displayed  Results from last 7 days   Lab Units 08/04/22  0804   INR  1 24*                   Lines/Drains:  Invasive Devices  Report    Peripheral Intravenous Line  Duration           Peripheral IV 08/02/22 Dorsal (posterior); Left Wrist 6 days          Drain  Duration           Nephrostomy Left 10 Fr  10 days    Nephrostomy Right 10 Fr  10 days                      Imaging: No pertinent imaging reviewed      Recent Cultures (last 7 days):         Last 24 Hours Medication List:   Current Facility-Administered Medications   Medication Dose Route Frequency Provider Last Rate    acetaminophen  650 mg Oral Q6H PRN Patricio Canas PA-C      allopurinol  300 mg Oral Daily [place], [place]      bicalutamide  50 mg Oral Daily TRICIA Leroy      bisacodyl  10 mg Rectal Daily PRN Jenna Whatley MD      docusate sodium  100 mg Oral BID Clifford Stahl PA-C      finasteride  5 mg Oral Daily Patricio Canas PA-C      heparin (porcine)  5,000 Units Subcutaneous Q8H [address] Ion Luevano MD      metoprolol succinate  100 mg Oral Daily [place], [place]      oxyCODONE  5 mg Oral Q6H PRN Roz Alan PA-C      polyethylene glycol  17 g Oral Daily Ion Luevano MD      pravastatin  40 mg Oral Daily With [employer]      tamsulosin  0 4 mg Oral Daily With TRICIA Loredo          Today, Patient Was Seen By: Kofi Lyn MD    **Please Note: This note may have been constructed using a voice recognition system  **

## 2022-08-08 NOTE — CASE MANAGEMENT
Case Management Discharge Planning Note    Patient name Janice Mcdaniel  Location /-04 MRN [id number]  : 1950 Date 2022       Current Admission Date: 2022  Current Admission Diagnosis:Acute blood loss anemia   Patient Active Problem List    Diagnosis Date Noted   Deonna Latham hematuria 2022    Prostate cancer metastatic to bone ([facility] ) 2022    Acute blood loss anemia 2022    Right shoulder pain 2022    [facility] 20[facility]    Generalized weakness 2020    Need for vaccination 2020    Cough 2020    Sinus congestion 2020    Medicare annual wellness visit, subsequent 2020    Encounter to establish care 06/10/2020    Shortness of breath 2020    Prolonged Q-T interval on ECG 2020    Acute respiratory failure with hypoxia ([facility] ) secondary to presumed COVID-19 infection 2020    Suspected COVID-19 virus infection 2020    Hypertension 2020      LOS (days): 11  Geometric Mean LOS (GMLOS) (days): 3 50  Days to GMLOS:-7 2     OBJECTIVE:  Risk of Unplanned Readmission Score: 18 16         Current admission status: Inpatient   Preferred Pharmacy:   [facility], [address] [address]  Phone: [phone] Fax: [phone]    Primary Care Provider: TRICIA Chavez    Primary Insurance: MEDICARE MISC REPLACEMENT  Secondary Insurance:     DISCHARGE:    Other Referral/Resources/Interventions Provided:  Referral Comments: Checking with [facility] or [facility] [place] for acceptance for today  requested PT/OT to give updated notes since she is an [place]

## 2022-08-08 NOTE — PHYSICAL THERAPY NOTE
Physical Therapy Treatment Note    Patient's Name: Lizzy Zabala    Admitting Diagnosis  Anemia [D64 9]  Paralysis ([facility] ) [G83 9]  Hematuria [R31 9]  Other hydronephrosis [N13 39]  Benign prostatic hyperplasia without lower urinary tract symptoms [N40 0]    Problem List  Patient Active Problem List   Diagnosis    Shortness of breath    Prolonged Q-T interval on ECG    Acute respiratory failure with hypoxia ([facility] ) secondary to presumed COVID-19 infection    Suspected COVID-19 virus infection    Hypertension    Encounter to establish care    Medicare annual wellness visit, subsequent    Generalized weakness    Need for vaccination    Cough    Sinus congestion    Gross hematuria    Prostate cancer metastatic to bone [facility])    Acute blood loss anemia    Right shoulder pain    [facility] ([facility] )        08/08/22 0917   PT Last Visit   PT Visit Date 08/08/22   Note Type   Note Type Treatment   Pain Assessment   Pain Assessment Tool 0-10   Pain Score No Pain   Restrictions/Precautions   Weight Bearing Precautions Per Order No   Other Precautions Multiple lines; Fall Risk;Limb alert; Chair Alarm; Bed Alarm   General   Chart Reviewed Yes   Response to Previous Treatment Patient with no complaints from previous session  Family/Caregiver Present No   Cognition   Overall Cognitive Status WFL   Arousal/Participation Alert   Attention Attends with cues to redirect   Orientation Level Oriented X4   Memory Within functional limits   Following Commands Follows all commands and directions without difficulty   Comments Pt agreeable to PT   Bed Mobility   Supine to Sit 3  Moderate assistance   Additional items Assist x 1;HOB elevated; Increased time required;Verbal cues;LE management   Additional Comments Pt received at start of session supine in bed with HOB elevated   Following session pt remained in bedside recliner with needs met, alarm activated and call bell within reach   Transfers   Sit to Stand 4  Minimal assistance   Additional items Assist x 2;Bedrails; Increased time required;Verbal cues   Stand to Sit 4  Minimal assistance   Additional items Assist x 2;Armrests; Increased time required;Verbal cues   Additional Comments VCs for hand placements with use of RW   Ambulation/Elevation   Gait pattern Decreased foot clearance; Inconsistent nigel; Foward flexed; Short stride   Gait Assistance 4  Minimal assist   Additional items Assist x 1;Verbal cues   Assistive Device Rolling walker   Distance 2 x 25' then 36'   Stair Management Assistance Not tested   Balance   Static Sitting Fair +   Dynamic Sitting Fair   Static Standing Fair -   Dynamic Standing Poor +   Ambulatory Poor +   Endurance Deficit   Endurance Deficit Yes   Endurance Deficit Description decreased activity tolerance   Activity Tolerance   Activity Tolerance Patient limited by fatigue   Nurse Made Aware RN Lico Quezada   Exercises   Hip Flexion Sitting;15 reps;AROM; Bilateral   Knee AROM Long Arc Quad Sitting;15 reps;AROM; Bilateral   Ankle Pumps Sitting;15 reps;AROM; Bilateral   Assessment   Prognosis Good   Problem List Decreased strength; Impaired balance;Decreased endurance;Decreased mobility; Decreased cognition;Pain   Assessment Pt seen for PT treatment session this date with interventions consisting of gait training w/ emphasis on improving pt's ability to ambulate level surfaces x x25' then 36' with min A provided by therapist with RW, Therapeutic exercise consisting of: LE exercises in seated position and therapeutic activity consisting of training: bed mobility, supine<>sit transfers, sit<>stand transfers and vc and tactile cues for static standing posture faciliation  Pt agreeable to PT treatment session upon arrival, pt found supine in bed w/ HOB elevated, in no apparent distress and responsive  In comparison to previous session, pt with improvements in LE strength, balance and mobility   Post session: pt returned back to recliner, chair alarm engaged, all needs in reach and RN notified of session findings/recommendations  Continue to recommend post acute rehabilitation services at time of d/c in order to maximize pt's functional independence and safety w/ mobility  Pt continues to be functioning below baseline level, and remains limited 2* factors listed above and including continued need for medical management and monitoring, decreased strength and balance resulting in an increased risk for falls, decreased endurance and activity tolerance limiting overall mobility  PT will continue to see pt during current hospitalization in order to address the deficits listed above and provide interventions consistent w/ POC in effort to achieve STGs  Barriers to Discharge Decreased caregiver support  (decline in functional status)   Goals   STG Expiration Date 08/15/22   PT Treatment Day 2   Plan   Treatment/Interventions LE strengthening/ROM; Functional transfer training; Therapeutic exercise; Endurance training;Patient/family training;Gait training; Compensatory technique education; Bed mobility;Spoke to nursing;OT   Progress Progressing toward goals   PT Frequency 4-6x/wk   Recommendation   PT Discharge Recommendation Post acute rehabilitation services   AM-PAC Basic Mobility Inpatient   Turning in Bed Without Bedrails 2   Lying on Back to Sitting on Edge of Flat Bed 2   Moving Bed to Chair 3   Standing Up From Chair 2   Walk in Room 3   Climb 3-5 Stairs 2   Basic Mobility Inpatient Raw Score 14   Basic Mobility Standardized Score 35 55   Highest Level Of Mobility   JH-HLM Goal 4: Move to chair/commode   JH-HLM Achieved 7: Walk 25 feet or more   Education   Education Provided Mobility training;Assistive device   Patient Reinforcement needed   End of Consult   Patient Position at End of Consult Bedside chair;Bed/Chair alarm activated; All needs within reach       Scott Hanna, PT    Time In: 09:17  Time Out: 09:44  Total Time: 25 mins

## 2022-08-09 LAB — SARS-COV-2 RNA RESP QL NAA+PROBE: NEGATIVE

## 2022-08-09 PROCEDURE — U0005 INFEC AGEN DETEC AMPLI PROBE: HCPCS | Performed by: FAMILY MEDICINE

## 2022-08-09 PROCEDURE — 99232 SBSQ HOSP IP/OBS MODERATE 35: CPT | Performed by: FAMILY MEDICINE

## 2022-08-09 PROCEDURE — U0003 INFECTIOUS AGENT DETECTION BY NUCLEIC ACID (DNA OR RNA); SEVERE ACUTE RESPIRATORY SYNDROME CORONAVIRUS 2 (SARS-COV-2) (CORONAVIRUS DISEASE [COVID-19]), AMPLIFIED PROBE TECHNIQUE, MAKING USE OF HIGH THROUGHPUT TECHNOLOGIES AS DESCRIBED BY CMS-2020-01-R: HCPCS | Performed by: FAMILY MEDICINE

## 2022-08-09 RX ADMIN — HEPARIN SODIUM 5000 UNITS: 5000 INJECTION INTRAVENOUS; SUBCUTANEOUS at 22:04

## 2022-08-09 RX ADMIN — ALLOPURINOL 300 MG: 300 TABLET ORAL at 09:53

## 2022-08-09 RX ADMIN — HEPARIN SODIUM 5000 UNITS: 5000 INJECTION INTRAVENOUS; SUBCUTANEOUS at 14:40

## 2022-08-09 RX ADMIN — BICALUTAMIDE 50 MG: 50 TABLET, FILM COATED ORAL at 09:55

## 2022-08-09 RX ADMIN — HEPARIN SODIUM 5000 UNITS: 5000 INJECTION INTRAVENOUS; SUBCUTANEOUS at 06:25

## 2022-08-09 RX ADMIN — DOCUSATE SODIUM 100 MG: 100 CAPSULE, LIQUID FILLED ORAL at 17:05

## 2022-08-09 RX ADMIN — PRAVASTATIN SODIUM 40 MG: 40 TABLET ORAL at 17:05

## 2022-08-09 RX ADMIN — FINASTERIDE 5 MG: 5 TABLET, FILM COATED ORAL at 09:54

## 2022-08-09 RX ADMIN — DOCUSATE SODIUM 100 MG: 100 CAPSULE, LIQUID FILLED ORAL at 09:53

## 2022-08-09 RX ADMIN — METOPROLOL SUCCINATE 100 MG: 100 TABLET, EXTENDED RELEASE ORAL at 09:53

## 2022-08-09 RX ADMIN — TAMSULOSIN HYDROCHLORIDE 0.4 MG: 0.4 CAPSULE ORAL at 17:05

## 2022-08-09 NOTE — ASSESSMENT & PLAN NOTE
· CT a/p 7/27: revealed 1 cm nodule in the right middle lobe not seen on the prior exam from 6/22/2020 concerning for metastasis and diffuse sclerosis throughout the osseous pelvis and in the L3, L2, T12, T11, T10, and T8 vertebral bodies also concerning for metastasis   · 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 as well  · Status post IR guided left iliac biopsy on 8/2  · Biopsy revealed prostate cancer  · outpatient follow-up with Oncology

## 2022-08-09 NOTE — PLAN OF CARE
Problem: Potential for Falls  Goal: Patient will remain free of falls  Description: INTERVENTIONS:  - Educate patient/family on patient safety including physical limitations  - Instruct patient to call for assistance with activity   - Consult OT/PT to assist with strengthening/mobility   - Keep Call bell within reach  - Keep bed low and locked with side rails adjusted as appropriate  - Keep care items and personal belongings within reach  - Initiate and maintain comfort rounds  - Make Fall Risk Sign visible to staff  - Offer Toileting every 2 Hours, in advance of need  - Initiate/Maintain alarm  - Obtain necessary fall risk management equipment  - Apply yellow socks and bracelet for high fall risk patients  - Consider moving patient to room near nurses station  Outcome: Progressing     Problem: PAIN - ADULT  Goal: Verbalizes/displays adequate comfort level or baseline comfort level  Description: Interventions:  - Encourage patient to monitor pain and request assistance  - Assess pain using appropriate pain scale  - Administer analgesics based on type and severity of pain and evaluate response  - Implement non-pharmacological measures as appropriate and evaluate response  - Consider cultural and social influences on pain and pain management  - Notify physician/advanced practitioner if interventions unsuccessful or patient reports new pain  Outcome: Progressing     Problem: INFECTION - ADULT  Goal: Absence or prevention of progression during hospitalization  Description: INTERVENTIONS:  - Assess and monitor for signs and symptoms of infection  - Monitor lab/diagnostic results  - Monitor all insertion sites, i e  indwelling lines, tubes, and drains  - Monitor endotracheal if appropriate and nasal secretions for changes in amount and color  - Theodore appropriate cooling/warming therapies per order  - Administer medications as ordered  - Instruct and encourage patient and family to use good hand hygiene technique  - Identify and instruct in appropriate isolation precautions for identified infection/condition  Outcome: Progressing  Goal: Absence of fever/infection during neutropenic period  Description: INTERVENTIONS:  - Monitor WBC    Outcome: Progressing     Problem: SAFETY ADULT  Goal: Patient will remain free of falls  Description: INTERVENTIONS:  - Educate patient/family on patient safety including physical limitations  - Instruct patient to call for assistance with activity   - Consult OT/PT to assist with strengthening/mobility   - Keep Call bell within reach  - Keep bed low and locked with side rails adjusted as appropriate  - Keep care items and personal belongings within reach  - Initiate and maintain comfort rounds  - Make Fall Risk Sign visible to staff  - Offer Toileting every 2 Hours, in advance of need  - Initiate/Maintain alarm  - Obtain necessary fall risk management equipment  - Apply yellow socks and bracelet for high fall risk patients  - Consider moving patient to room near nurses station  Outcome: Progressing  Goal: Maintain or return to baseline ADL function  Description: INTERVENTIONS:  - Educate patient/family on patient safety including physical limitations  - Instruct patient to call for assistance with activity   - Consult OT/PT to assist with strengthening/mobility   - Keep Call bell within reach  - Keep bed low and locked with side rails adjusted as appropriate  - Keep care items and personal belongings within reach  - Initiate and maintain comfort rounds  - Make Fall Risk Sign visible to staff  - Offer Toileting every 2 Hours, in advance of need  - Initiate/Maintain alarm  - Obtain necessary fall risk management equipment  - Apply yellow socks and bracelet for high fall risk patients  - Consider moving patient to room near nurses station  Outcome: Progressing  Goal: Maintains/Returns to pre admission functional level  Description: INTERVENTIONS:  - Perform BMAT or MOVE assessment daily    - Set and communicate daily mobility goal to care team and patient/family/caregiver  - Collaborate with rehabilitation services on mobility goals if consulted  - Stand patient 3 times a day  - Ambulate patient 3 times a day  - Out of bed to chair 3 times a day   - Out of bed for meals 3 times a day  - Out of bed for toileting  - Record patient progress and toleration of activity level   Outcome: Progressing     Problem: DISCHARGE PLANNING  Goal: Discharge to home or other facility with appropriate resources  Description: INTERVENTIONS:  - Identify barriers to discharge w/patient and caregiver  - Arrange for needed discharge resources and transportation as appropriate  - Identify discharge learning needs (meds, wound care, etc )  - Arrange for interpretive services to assist at discharge as needed  - Refer to Case Management Department for coordinating discharge planning if the patient needs post-hospital services based on physician/advanced practitioner order or complex needs related to functional status, cognitive ability, or social support system  Outcome: Progressing     Problem: Knowledge Deficit  Goal: Patient/family/caregiver demonstrates understanding of disease process, treatment plan, medications, and discharge instructions  Description: Complete learning assessment and assess knowledge base    Interventions:  - Provide teaching at level of understanding  - Provide teaching via preferred learning methods  Outcome: Progressing     Problem: HEMATOLOGIC - ADULT  Goal: Maintains hematologic stability  Description: INTERVENTIONS  - Assess for signs and symptoms of bleeding or hemorrhage  - Monitor labs  - Administer supportive blood products/factors as ordered and appropriate  Outcome: Progressing     Problem: MOBILITY - ADULT  Goal: Maintain or return to baseline ADL function  Description: INTERVENTIONS:  - Educate patient/family on patient safety including physical limitations  - Instruct patient to call for assistance with activity   - Consult OT/PT to assist with strengthening/mobility   - Keep Call bell within reach  - Keep bed low and locked with side rails adjusted as appropriate  - Keep care items and personal belongings within reach  - Initiate and maintain comfort rounds  - Make Fall Risk Sign visible to staff  - Offer Toileting every 2 Hours, in advance of need  - Initiate/Maintain alarm  - Obtain necessary fall risk management equipment  - Apply yellow socks and bracelet for high fall risk patients  - Consider moving patient to room near nurses station  Outcome: Progressing  Goal: Maintains/Returns to pre admission functional level  Description: INTERVENTIONS:  - Perform BMAT or MOVE assessment daily    - Set and communicate daily mobility goal to care team and patient/family/caregiver     - Collaborate with rehabilitation services on mobility goals if consulted  - Stand patient 3 times a day  - Ambulate patient 3 times a day  - Out of bed to chair 3 times a day   - Out of bed for meals 3 times a day  - Out of bed for toileting  - Record patient progress and toleration of activity level   Outcome: Progressing     Problem: Prexisting or High Potential for Compromised Skin Integrity  Goal: Skin integrity is maintained or improved  Description: INTERVENTIONS:  - Identify patients at risk for skin breakdown  - Assess and monitor skin integrity  - Assess and monitor nutrition and hydration status  - Monitor labs   - Assess for incontinence   - Turn and reposition patient  - Assist with mobility/ambulation  - Relieve pressure over bony prominences  - Avoid friction and shearing  - Provide appropriate hygiene as needed including keeping skin clean and dry  - Evaluate need for skin moisturizer/barrier cream  - Collaborate with interdisciplinary team   - Patient/family teaching  - Consider wound care consult   Outcome: Progressing     Problem: Nutrition/Hydration-ADULT  Goal: Nutrient/Hydration intake appropriate for improving, restoring or maintaining nutritional needs  Description: Monitor and assess patient's nutrition/hydration status for malnutrition  Collaborate with interdisciplinary team and initiate plan and interventions as ordered  Monitor patient's weight and dietary intake as ordered or per policy  Utilize nutrition screening tool and intervene as necessary  Determine patient's food preferences and provide high-protein, high-caloric foods as appropriate       INTERVENTIONS:  - Monitor oral intake, urinary output, labs, and treatment plans  - Assess nutrition and hydration status and recommend course of action  - Evaluate amount of meals eaten  - Assist patient with eating if necessary   - Allow adequate time for meals  - Recommend/ encourage appropriate diets, oral nutritional supplements, and vitamin/mineral supplements  - Order, calculate, and assess calorie counts as needed  - Recommend, monitor, and adjust tube feedings based on assessed needs  - Assess need for intravenous fluids  - Provide nutrition/hydration education as appropriate  - Include patient/family/caregiver in decisions related to nutrition  Outcome: Progressing

## 2022-08-09 NOTE — PLAN OF CARE
Problem: SAFETY ADULT  Goal: Maintain or return to baseline ADL function  Description: INTERVENTIONS:  - Educate patient/family on patient safety including physical limitations  - Instruct patient to call for assistance with activity   - Consult OT/PT to assist with strengthening/mobility   - Keep Call bell within reach  - Keep bed low and locked with side rails adjusted as appropriate  - Keep care items and personal belongings within reach  - Initiate and maintain comfort rounds  - Make Fall Risk Sign visible to staff  - Offer Toileting every 2 Hours, in advance of need  - Initiate/Maintain alarm  - Obtain necessary fall risk management equipment  - Apply yellow socks and bracelet for high fall risk patients  - Consider moving patient to room near nurses station  Outcome: Progressing     Problem: DISCHARGE PLANNING  Goal: Discharge to home or other facility with appropriate resources  Description: INTERVENTIONS:  - Identify barriers to discharge w/patient and caregiver  - Arrange for needed discharge resources and transportation as appropriate  - Identify discharge learning needs (meds, wound care, etc )  - Arrange for interpretive services to assist at discharge as needed  - Refer to Case Management Department for coordinating discharge planning if the patient needs post-hospital services based on physician/advanced practitioner order or complex needs related to functional status, cognitive ability, or social support system  Outcome: Progressing     Problem: Knowledge Deficit  Goal: Patient/family/caregiver demonstrates understanding of disease process, treatment plan, medications, and discharge instructions  Description: Complete learning assessment and assess knowledge base    Interventions:  - Provide teaching at level of understanding  - Provide teaching via preferred learning methods  Outcome: Progressing

## 2022-08-09 NOTE — NURSING NOTE
Patient informed nurse that his daughter who frequently visits him in hospital tested positive for [place]  Family member last visited patient on Sunday  Per patient, daughter is vaccinated but has COVID for second time now; daughter experiencing fevers, cough, difficulty breathing, and body aches  Dr Samia Peterson made aware  Order for COVID swab received  Patient placed in isolation until results of patient's COVID test came back

## 2022-08-09 NOTE — CASE MANAGEMENT
Case Management Progress Note    Patient name Iggy Adames  Location /-17 MRN [id number]  : 1950 Date 2022       LOS (days): 12  Geometric Mean LOS (GMLOS) (days): 3 50  Days to GMLOS:-8 6        OBJECTIVE:        Current admission status: Inpatient  Preferred Pharmacy:   [facility] [address], [place] - [address] [facility]  [address] [place] [address]  Phone: [phone] Fax: [phone]    Primary Care Provider: TRICIA Barros    Primary Insurance: MEDICARE MISC REPLACEMENT  Secondary Insurance:     PROGRESS NOTE:  SAL spoke with [address] [place] [address] and she reports they're still waiting to hear back from insurance re: one time contract  SAL reached out to [facility] [place] [address] at [phone]  She reports that she would not oversee patient's case, as it's assigned by first letter of the SNF, but provided contact info for RN Richard at [phone]  Evie Mediate reports they "don't discuss money" until after determining medical necessity for STR, so she reports [address] has to submit for auth first and then once approved, they can request and discuss the one time contract  She reports that their medical director is usually pretty agreeable to same when patients are out of [facility] and need rehab  Evie Mediate instructed us to send H&P, most recent progress notes and first and most recent PT/OT notes, along with plans for future home supports, Mai Vivar, and all medical needs to fax: [phone] or email to [email or web address]  We are also to include hospital NPI and contact info as well as same for the facility  We can call their administrative team at [phone] or RN Richard for future follow up  Once all documentation is received in good order, their general turn around time is 1 business day      SAL relayed all info to [address] [place] [address] and she reported she'd speak with her insurance team and will have them send all clinical and submit for auth as advised  CM will continue to follow and will update SLIM/family as soon as we have an update

## 2022-08-09 NOTE — PROGRESS NOTES
[facility]  Progress Note - [place] 1950, 67 y o  male MRN: [id number]  Unit/Bed#: MS Javi-Bowen Encounter: [id number]  Primary Care Provider: TRICIA Cruz   Date and time admitted to hospital: 7/27/2022 11:40 PM    Gross hematuria  Assessment & Plan  · Patient presented with gross hematuria x 1month   Had prostate biopsy in past, last bx 10/2021, cytology negative  Follows with a urologist in [place]    · CT A/P 07/27: asymmetrically enlarged prostate gland; left side is larger  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  Bilateral hydronephrosis likely from obstruction at the UVJ secondary to enlarged/invading prostate  No renal stones     · Status post bilateral percutaneous nephrostomy placement on 7/28  · Hematuria resolving, OP fu with urology  · Monitor H/H : 7 9 today    MICA (acute kidney injury) (HCC)-resolved as of 8/8/2022  Assessment & Plan  · Secondary to b/l hydronephrosis  · Presented creatinine 1 5, now resolved s/p nephrostomy tubes/ivf      * Acute blood loss anemia  Assessment & Plan  · Likely from hematuria   · Required 3 units PRBC transfusion during current admission  · Hemoglobin stable above 7, monitor for now     Prostate cancer metastatic to bone [facility])  Assessment & Plan  · CT a/p 7/27: revealed 1 cm nodule in the right middle lobe not seen on the prior exam from 6/22/2020 concerning for metastasis and diffuse sclerosis throughout the osseous pelvis and in the L3, L2, T12, T11, T10, and T8 vertebral bodies also concerning for metastasis   · 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 as well  · Status post IR guided left iliac biopsy on 8/2  · Biopsy revealed prostate cancer  · outpatient follow-up with Oncology    Generalized weakness  Assessment & Plan  · PT/OT recommending rehab  · CM working on same      VTE Pharmacologic Prophylaxis:   Pharmacologic: Heparin  Mechanical VTE Prophylaxis in Place: Yes    Patient Centered Rounds: I have performed bedside rounds with nursing staff today  Discussions with Specialists or Other Care Team Provider: dung    Education and Discussions with Family / Patient: dw daughter [place]    Time Spent for Care: 30 minutes  More than 50% of total time spent on counseling and coordination of care as described above  Current Length of Stay: 12 day(s)    Current Patient Status: Inpatient   Certification Statement: The patient will continue to require additional inpatient hospital stay due to awaiting rehab    Discharge Plan: once rehab avail    Code Status: Level 1 - Full Code      Subjective:   No fever/ respiratory complaints  Patient states his daughter tested positive for covid, although she was wearing a mask around him  Objective:     Vitals:   Temp (24hrs), Av 1 °F (36 7 °C), Min:97 9 °F (36 6 °C), Max:98 3 °F (36 8 °C)    Temp:  [97 9 °F (36 6 °C)-98 3 °F (36 8 °C)] 97 9 °F (36 6 °C)  HR:  [67-79] 79  Resp:  [17-20] 17  BP: (121-127)/(64-67) 123/67  SpO2:  [95 %-98 %] 96 %  Body mass index is 29 92 kg/m²  Input and Output Summary (last 24 hours): Intake/Output Summary (Last 24 hours) at 2022 1239  Last data filed at 2022 9459  Gross per 24 hour   Intake 1216 ml   Output 1515 ml   Net -299 ml       Physical Exam:     Physical Exam  Constitutional:       General: He is not in acute distress  Appearance: He is obese  He is not toxic-appearing  HENT:      Mouth/Throat:      Mouth: Mucous membranes are moist       Pharynx: Oropharynx is clear  Cardiovascular:      Rate and Rhythm: Normal rate and regular rhythm  Pulses: Normal pulses  Pulmonary:      Effort: Pulmonary effort is normal       Breath sounds: Normal breath sounds  Abdominal:      General: Abdomen is flat  Bowel sounds are normal       Palpations: Abdomen is soft  Neurological:      Mental Status: He is alert and oriented to person, place, and time           Additional Data:     Labs:    Results from last 7 days   Lab Units 08/08/22  0625   WBC Thousand/uL 10 08   HEMOGLOBIN g/dL 7 9*   HEMATOCRIT % 25 2*   PLATELETS Thousands/uL 249   NEUTROS PCT % 66   LYMPHS PCT % 19   MONOS PCT % 12   EOS PCT % 2     Results from last 7 days   Lab Units 08/08/22  0625 08/06/22  0501 08/04/22  0544   SODIUM mmol/L 133*   < >  --    POTASSIUM mmol/L 4 4   < >  --    CHLORIDE mmol/L 98   < >  --    CO2 mmol/L 26   < >  --    BUN mg/dL 25   < >  --    CREATININE mg/dL 0 90   < >  --    ANION GAP mmol/L 9   < >  --    CALCIUM mg/dL 8 9   < >  --    ALBUMIN g/dL  --   --  2 4*   TOTAL BILIRUBIN mg/dL  --   --  0 65   ALK PHOS U/L  --   --  837*   ALT U/L  --   --  13   AST U/L  --   --  37   GLUCOSE RANDOM mg/dL 95   < >  --     < > = values in this interval not displayed  Results from last 7 days   Lab Units 08/04/22  0804   INR  1 24*                       * I Have Reviewed All Lab Data Listed Above  * Additional Pertinent Lab Tests Reviewed:  All Labs Within Last 24 Hours Reviewed    Recent Cultures (last 7 days):           Last 24 Hours Medication List:   Current Facility-Administered Medications   Medication Dose Route Frequency Provider Last Rate    acetaminophen  650 mg Oral Q6H PRN Alec Harley PA-C      allopurinol  300 mg Oral Daily Phyllis Lawson      bicalutamide  50 mg Oral Daily Bob Messer, TRICIA      bisacodyl  10 mg Rectal Daily PRN Rose Brewer MD      docusate sodium  100 mg Oral BID Kim Nieto PA-C      finasteride  5 mg Oral Daily Alec Harley PA-C      heparin (porcine)  5,000 Units Subcutaneous Q8H [facility] Ion Luevano MD      metoprolol succinate  100 mg Oral Daily Phyllis Lawson      oxyCODONE  5 mg Oral Q6H PRN Kenya Pierre PA-C      polyethylene glycol  17 g Oral Daily Ion Luevano MD      pravastatin  40 mg Oral Daily With [facility]      tamsulosin  0 4 mg Oral Daily With TRICIA Loredo          Today, Patient Was Seen By: JAE Bustos      ** Please Note: Dictation voice to text software may have been used in the creation of this document   **

## 2022-08-09 NOTE — ASSESSMENT & PLAN NOTE
· Secondary to b/l hydronephrosis  · Presented creatinine 1 5, now resolved s/p nephrostomy tubes/ivf

## 2022-08-09 NOTE — ASSESSMENT & PLAN NOTE
· Patient presented with gross hematuria x 1month   Had prostate biopsy in past, last bx 10/2021, cytology negative  Follows with a urologist in [place]    · CT A/P 07/27: asymmetrically enlarged prostate gland; left side is larger  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  Bilateral hydronephrosis likely from obstruction at the UVJ secondary to enlarged/invading prostate  No renal stones     · Status post bilateral percutaneous nephrostomy placement on 7/28  · Hematuria resolving, OP fu with urology  · Monitor H/H : 7 9 today

## 2022-08-09 NOTE — UTILIZATION REVIEW
Continued Stay Review    Date: 08/09/22                          Current Patient Class: IP  Current Level of Care: Med/Surg    HPI:72 y o  male initially admitted on 7/28 for acute blood loss anemia  Assessment/Plan: Pt reports daughter that has visited him in the hospital tested positive for COVID-19; although reports she wore a mask around him  Exam unremarkable  COVID-19 test negative today  Plan: monitor H&H, PT/OT recommended STR, pending placement  Vital Signs:   Date/Time Temp Pulse Resp BP MAP (mmHg) SpO2 O2 Device   08/09/22 09:55:40 -- 79 -- 123/67 86 96 % None (Room air)   08/09/22 08:04:01 97 9 °F (36 6 °C) 73 17 121/67 85 95 % --   08/08/22 22:05:59 98 3 °F (36 8 °C) 76 20 127/65 86 98 % None (Room air)   08/08/22 15:51:19 98 2 °F (36 8 °C) 67 18 124/64 84 97 % None (Room air)       Pertinent Labs/Diagnostic Results:   Results from last 7 days   Lab Units 08/09/22  1107   SARS-COV-2  Negative     Results from last 7 days   Lab Units 08/08/22  0625 08/06/22  0933 08/04/22  0544 08/03/22  1618 08/03/22  0531   WBC Thousand/uL 10 08 9 18  --   --  10 04   HEMOGLOBIN g/dL 7 9* 8 1*  --   --  7 5*   HEMATOCRIT % 25 2* 24 9*  --   --  22 7*   PLATELETS Thousands/uL 249 217 158   < > 146*   NEUTROS ABS Thousands/µL 6 63 5 84  --   --  6 62    < > = values in this interval not displayed           Results from last 7 days   Lab Units 08/08/22  0625 08/06/22  0501 08/03/22  0531   SODIUM mmol/L 133* 134* 133*   POTASSIUM mmol/L 4 4 4 7 4 8   CHLORIDE mmol/L 98 99 101   CO2 mmol/L 26 26 23   ANION GAP mmol/L 9 9 9   BUN mg/dL 25 16 17   CREATININE mg/dL 0 90 0 92 0 99   EGFR ml/min/1 73sq m 84 82 75   CALCIUM mg/dL 8 9 9 0 8 2*     Results from last 7 days   Lab Units 08/04/22  0544   AST U/L 37   ALT U/L 13   ALK PHOS U/L 837*   TOTAL PROTEIN g/dL 6 1*   ALBUMIN g/dL 2 4*   TOTAL BILIRUBIN mg/dL 0 65   BILIRUBIN DIRECT mg/dL 0 15         Results from last 7 days   Lab Units 08/08/22  0625 08/06/22  0501 08/03/22  0531   GLUCOSE RANDOM mg/dL 95 96 94     Results from last 7 days   Lab Units 08/04/22  0804   D-DIMER QUANTITATIVE ug/ml FEU >20 00*     Results from last 7 days   Lab Units 08/04/22  0804   PROTIME seconds 15 3*   INR  1 24*   PTT seconds 42*       Medications:   Scheduled Medications:  allopurinol, 300 mg, Oral, Daily  bicalutamide, 50 mg, Oral, Daily  docusate sodium, 100 mg, Oral, BID  finasteride, 5 mg, Oral, Daily  heparin (porcine), 5,000 Units, Subcutaneous, Q8H SHAY  metoprolol succinate, 100 mg, Oral, Daily  polyethylene glycol, 17 g, Oral, Daily  pravastatin, 40 mg, Oral, Daily With Dinner  tamsulosin, 0 4 mg, Oral, Daily With Dinner    Continuous IV Infusions: none    PRN Meds:  acetaminophen, 650 mg, Oral, Q6H PRN  bisacodyl, 10 mg, Rectal, Daily PRN  oxyCODONE, 5 mg, Oral, Q6H PRN        Discharge Plan: TBD, pending STR placement    Network Utilization Review Department  ATTENTION: Please call with any questions or concerns to [phone] and carefully listen to the prompts so that you are directed to the right person  All voicemails are confidential   [facility] all requests for admission clinical reviews, approved or denied determinations and any other requests to dedicated fax number below belonging to the campus where the patient is receiving treatment   List of dedicated fax numbers for the Facilities:  [address] DENIALS (Administrative/Medical Necessity) [phone]   [address] [place] [address] (Maternity/NICU/Pediatrics) 261 [facility] Floor [facility] 40 125 [facility]  [address] [facility] [place] [address] [phone] [address] 1924 [facility] Deepti Wilson 1481 [address] [place] [id number] [phone]

## 2022-08-10 LAB
ANION GAP SERPL CALCULATED.3IONS-SCNC: 9 MMOL/L (ref 4–13)
BASOPHILS # BLD AUTO: 0.06 THOUSANDS/ÂΜL (ref 0–0.1)
BASOPHILS NFR BLD AUTO: 1 % (ref 0–1)
BUN SERPL-MCNC: 26 MG/DL (ref 5–25)
CALCIUM SERPL-MCNC: 8.9 MG/DL (ref 8.3–10.1)
CHLORIDE SERPL-SCNC: 98 MMOL/L (ref 96–108)
CO2 SERPL-SCNC: 26 MMOL/L (ref 21–32)
CREAT SERPL-MCNC: 0.92 MG/DL (ref 0.6–1.3)
EOSINOPHIL # BLD AUTO: 0.25 THOUSAND/ÂΜL (ref 0–0.61)
EOSINOPHIL NFR BLD AUTO: 2 % (ref 0–6)
ERYTHROCYTE [DISTWIDTH] IN BLOOD BY AUTOMATED COUNT: 15.6 % (ref 11.6–15.1)
GFR SERPL CREATININE-BSD FRML MDRD: 82 ML/MIN/1.73SQ M
GLUCOSE SERPL-MCNC: 94 MG/DL (ref 65–140)
HCT VFR BLD AUTO: 24.5 % (ref 36.5–49.3)
HGB BLD-MCNC: 8 G/DL (ref 12–17)
IMM GRANULOCYTES # BLD AUTO: 0.16 THOUSAND/UL (ref 0–0.2)
IMM GRANULOCYTES NFR BLD AUTO: 1 % (ref 0–2)
LYMPHOCYTES # BLD AUTO: 2.25 THOUSANDS/ÂΜL (ref 0.6–4.47)
LYMPHOCYTES NFR BLD AUTO: 20 % (ref 14–44)
MCH RBC QN AUTO: 29.5 PG (ref 26.8–34.3)
MCHC RBC AUTO-ENTMCNC: 32.7 G/DL (ref 31.4–37.4)
MCV RBC AUTO: 90 FL (ref 82–98)
MONOCYTES # BLD AUTO: 1.23 THOUSAND/ÂΜL (ref 0.17–1.22)
MONOCYTES NFR BLD AUTO: 11 % (ref 4–12)
NEUTROPHILS # BLD AUTO: 7.39 THOUSANDS/ÂΜL (ref 1.85–7.62)
NEUTS SEG NFR BLD AUTO: 65 % (ref 43–75)
NRBC BLD AUTO-RTO: 0 /100 WBCS
PLATELET # BLD AUTO: 251 THOUSANDS/UL (ref 149–390)
PMV BLD AUTO: 10.9 FL (ref 8.9–12.7)
POTASSIUM SERPL-SCNC: 4.4 MMOL/L (ref 3.5–5.3)
RBC # BLD AUTO: 2.71 MILLION/UL (ref 3.88–5.62)
SODIUM SERPL-SCNC: 133 MMOL/L (ref 135–147)
WBC # BLD AUTO: 11.34 THOUSAND/UL (ref 4.31–10.16)

## 2022-08-10 PROCEDURE — XW023W7 INTRODUCTION OF COVID-19 VACCINE BOOSTER INTO MUSCLE, PERCUTANEOUS APPROACH, NEW TECHNOLOGY GROUP 7: ICD-10-PCS | Performed by: FAMILY MEDICINE

## 2022-08-10 PROCEDURE — 99232 SBSQ HOSP IP/OBS MODERATE 35: CPT | Performed by: FAMILY MEDICINE

## 2022-08-10 PROCEDURE — 91305 COVID-19 PFIZER VAC (TRIS SUCROSE, GRAY CAP FORM) 30 MCG/0.3ML SUSP: CPT | Performed by: FAMILY MEDICINE

## 2022-08-10 PROCEDURE — 85025 COMPLETE CBC W/AUTO DIFF WBC: CPT | Performed by: FAMILY MEDICINE

## 2022-08-10 PROCEDURE — 80048 BASIC METABOLIC PNL TOTAL CA: CPT | Performed by: FAMILY MEDICINE

## 2022-08-10 RX ADMIN — BICALUTAMIDE 50 MG: 50 TABLET, FILM COATED ORAL at 09:54

## 2022-08-10 RX ADMIN — TAMSULOSIN HYDROCHLORIDE 0.4 MG: 0.4 CAPSULE ORAL at 18:11

## 2022-08-10 RX ADMIN — DOCUSATE SODIUM 100 MG: 100 CAPSULE, LIQUID FILLED ORAL at 09:52

## 2022-08-10 RX ADMIN — HEPARIN SODIUM 5000 UNITS: 5000 INJECTION INTRAVENOUS; SUBCUTANEOUS at 21:34

## 2022-08-10 RX ADMIN — HEPARIN SODIUM 5000 UNITS: 5000 INJECTION INTRAVENOUS; SUBCUTANEOUS at 05:56

## 2022-08-10 RX ADMIN — FINASTERIDE 5 MG: 5 TABLET, FILM COATED ORAL at 09:52

## 2022-08-10 RX ADMIN — ALLOPURINOL 300 MG: 300 TABLET ORAL at 09:52

## 2022-08-10 RX ADMIN — BNT162B2 0.3 ML: 0.23 INJECTION, SUSPENSION INTRAMUSCULAR at 18:10

## 2022-08-10 RX ADMIN — HEPARIN SODIUM 5000 UNITS: 5000 INJECTION INTRAVENOUS; SUBCUTANEOUS at 14:59

## 2022-08-10 RX ADMIN — PRAVASTATIN SODIUM 40 MG: 40 TABLET ORAL at 18:11

## 2022-08-10 RX ADMIN — METOPROLOL SUCCINATE 100 MG: 100 TABLET, EXTENDED RELEASE ORAL at 09:53

## 2022-08-10 NOTE — PROGRESS NOTES
[facility]  Progress Note - Janice Mcadniel 1950, 67 y o  male MRN: [id number]  Unit/Bed#: -[place] Encounter: [id number]  Primary Care Provider: TRICIA Chavez   Date and time admitted to hospital: 7/27/2022 11:40 PM    Gross hematuria  Assessment & Plan  · Patient presented with gross hematuria x 1month   Had prostate biopsy in past, last bx 10/2021, cytology negative  Follows with a urologist in [place]    · CT A/P 07/27: asymmetrically enlarged prostate gland; left side is larger  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  Bilateral hydronephrosis likely from obstruction at the UVJ secondary to enlarged/invading prostate  No renal stones  · Status post bilateral percutaneous nephrostomy placement on 7/28  · Hematuria resolving, OP fu with urology  · Monitor H/H : 8 today    * Acute blood loss anemia  Assessment & Plan  · Likely from hematuria   · Required 3 units PRBC transfusion during current admission  · Hemoglobin stable above 7, monitor for now     Prostate cancer metastatic to bone [facility])  Assessment & Plan  · CT a/p 7/27: revealed 1 cm nodule in the right middle lobe not seen on the prior exam from 6/22/2020 concerning for metastasis and diffuse sclerosis throughout the osseous pelvis and in the L3, L2, T12, T11, T10, and T8 vertebral bodies also concerning for metastasis   · 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 as well  · Status post IR guided left iliac biopsy on 8/2  · Biopsy revealed prostate cancer  · outpatient follow-up with Oncology    Generalized weakness  Assessment & Plan  · PT/OT recommending rehab  · CM working on same        VTE Pharmacologic Prophylaxis:   Pharmacologic: Heparin  Mechanical VTE Prophylaxis in Place: Yes    Discussions with Specialists or Other Care Team Provider: dung    Education and Discussions with Family / Patient: dw daughter    Time Spent for Care: 30 minutes    More than 50% of total time spent on counseling and coordination of care as described above  Current Length of Stay: 13 day(s)    Current Patient Status: Inpatient   Certification Statement: The patient will continue to require additional inpatient hospital stay due to rehab availability    Discharge Plan: awaiting rehab    Code Status: Level 1 - Full Code      Subjective:   Awaiting rehab  No new concerns    Objective:     Vitals:   Temp (24hrs), Av 5 °F (36 9 °C), Min:98 1 °F (36 7 °C), Max:98 7 °F (37 1 °C)    Temp:  [98 1 °F (36 7 °C)-98 7 °F (37 1 °C)] 98 7 °F (37 1 °C)  HR:  [64-81] 70  Resp:  [18-20] 18  BP: ()/(64-73) 137/73  SpO2:  [95 %-98 %] 98 %  Body mass index is 29 58 kg/m²  Input and Output Summary (last 24 hours): Intake/Output Summary (Last 24 hours) at 8/10/2022 1132  Last data filed at 8/10/2022 0954  Gross per 24 hour   Intake 830 ml   Output 1075 ml   Net -245 ml       Physical Exam:     Physical Exam  Constitutional:       General: He is not in acute distress  Appearance: He is obese  He is not toxic-appearing  HENT:      Mouth/Throat:      Mouth: Mucous membranes are moist       Pharynx: Oropharynx is clear  Cardiovascular:      Rate and Rhythm: Normal rate and regular rhythm  Pulses: Normal pulses  Pulmonary:      Effort: Pulmonary effort is normal       Breath sounds: Normal breath sounds  Abdominal:      General: Abdomen is flat  Bowel sounds are normal       Palpations: Abdomen is soft  Neurological:      Mental Status: He is alert and oriented to person, place, and time           Additional Data:     Labs:    Results from last 7 days   Lab Units 08/10/22  0444   WBC Thousand/uL 11 34*   HEMOGLOBIN g/dL 8 0*   HEMATOCRIT % 24 5*   PLATELETS Thousands/uL 251   NEUTROS PCT % 65   LYMPHS PCT % 20   MONOS PCT % 11   EOS PCT % 2     Results from last 7 days   Lab Units 08/10/22  0444 22  0501 22  0544   SODIUM mmol/L 133*   < >  --    POTASSIUM mmol/L 4 4   < >  --    CHLORIDE mmol/L 98   < > --    CO2 mmol/L 26   < >  --    BUN mg/dL 26*   < >  --    CREATININE mg/dL 0 92   < >  --    ANION GAP mmol/L 9   < >  --    CALCIUM mg/dL 8 9   < >  --    ALBUMIN g/dL  --   --  2 4*   TOTAL BILIRUBIN mg/dL  --   --  0 65   ALK PHOS U/L  --   --  837*   ALT U/L  --   --  13   AST U/L  --   --  37   GLUCOSE RANDOM mg/dL 94   < >  --     < > = values in this interval not displayed  Results from last 7 days   Lab Units 08/04/22  0804   INR  1 24*                   * I Have Reviewed All Lab Data Listed Above  * Additional Pertinent Lab Tests Reviewed: All Labs Within Last 24 Hours Reviewed    Recent Cultures (last 7 days):           Last 24 Hours Medication List:   Current Facility-Administered Medications   Medication Dose Route Frequency Provider Last Rate    acetaminophen  650 mg Oral Q6H PRN Dominique Allen PA-C      allopurinol  300 mg Oral Daily [place], [place]      bicalutamide  50 mg Oral Daily TRICIA García      bisacodyl  10 mg Rectal Daily PRN Ramana Reyes MD      docusate sodium  100 mg Oral BID Taylor Aden PA-C      finasteride  5 mg Oral Daily Dominique Allen PA-C      heparin (porcine)  5,000 Units Subcutaneous Q8H [address] Ion Luevano MD      metoprolol succinate  100 mg Oral Daily [place], [place]      oxyCODONE  5 mg Oral Q6H PRN Frederick Bradford PA-C      polyethylene glycol  17 g Oral Daily Ion Luevano MD      pravastatin  40 mg Oral Daily With [place], [place]      tamsulosin  0 4 mg Oral Daily With TRICIA Loredo          Today, Patient Was Seen By: JAE Navas      ** Please Note: Dictation voice to text software may have been used in the creation of this document   **

## 2022-08-10 NOTE — QUICK NOTE
COVID booster given to left deltoid  Pfizer 0 3ml Lot number [id number] Expiration 09/14/2022  Verified by Jenae Noe RN

## 2022-08-10 NOTE — PLAN OF CARE
Problem: INFECTION - ADULT  Goal: Absence or prevention of progression during hospitalization  Description: INTERVENTIONS:  - Assess and monitor for signs and symptoms of infection  - Monitor lab/diagnostic results  - Monitor all insertion sites, i e  indwelling lines, tubes, and drains  - Monitor endotracheal if appropriate and nasal secretions for changes in amount and color  - [place] appropriate cooling/warming therapies per order  - Administer medications as ordered  - Instruct and encourage patient and family to use good hand hygiene technique  - Identify and instruct in appropriate isolation precautions for identified infection/condition  Outcome: Progressing  Goal: Absence of fever/infection during neutropenic period  Description: INTERVENTIONS:  - Monitor WBC    Outcome: Progressing     Problem: DISCHARGE PLANNING  Goal: Discharge to home or other facility with appropriate resources  Description: INTERVENTIONS:  - Identify barriers to discharge w/patient and caregiver  - Arrange for needed discharge resources and transportation as appropriate  - Identify discharge learning needs (meds, wound care, etc )  - Arrange for interpretive services to assist at discharge as needed  - Refer to Case Management Department for coordinating discharge planning if the patient needs post-hospital services based on physician/advanced practitioner order or complex needs related to functional status, cognitive ability, or social support system  Outcome: Progressing

## 2022-08-10 NOTE — CASE MANAGEMENT
Case Management Progress Note    Patient name Janice Mcdaniel  Location /-88 MRN [id number]  : 1950 Date 8/10/2022       LOS (days): 13  Geometric Mean LOS (GMLOS) (days): 3 50  Days to GMLOS:-9 5        OBJECTIVE:        Current admission status: Inpatient  Preferred Pharmacy:   [facility] [address], [address] - [facility]  [address] [place] [address] [address]  Phone: [phone] Fax: [phone]    Primary Care Provider: TRICIA Chavez    Primary Insurance: MEDICARE [address] [facility]  Secondary Insurance:     PROGRESS NOTE:  CM confirmed with [address] [place] [address] liaison that they were able to submit all clinical to patient's insurance plan  Awaiting determination at this time  As per SLIM, he remains medically stable for dc  CM reached out to daughter Ramona to provide update re: insurance status and let her know they generally have a 24 hr turn around time  She asked if patient can get his 2nd COVID booster prior to dc and CM requested same of SLIM  She also mentioned that he has f/u appts scheduled for the end of August and asked if the facility can help arrange transport to get him there  CM did reach out to [facility] liaison to ask about transport arrangements and will f/u with [facility]  CM let her know that this very well could be an OOP cost but they should be able to assist  CM will continue to follow and will coordinate dc as long as [facility] is able to obtain auth and single case agreement

## 2022-08-10 NOTE — PLAN OF CARE
Problem: Potential for Falls  Goal: Patient will remain free of falls  Description: INTERVENTIONS:  - Educate patient/family on patient safety including physical limitations  - Instruct patient to call for assistance with activity   - Consult OT/PT to assist with strengthening/mobility   - Keep Call bell within reach  - Keep bed low and locked with side rails adjusted as appropriate  - Keep care items and personal belongings within reach  - Initiate and maintain comfort rounds  - Make Fall Risk Sign visible to staff  - Offer Toileting every 2 Hours, in advance of need  - Initiate/Maintain alarm  - Obtain necessary fall risk management equipment  - Apply yellow socks and bracelet for high fall risk patients  - Consider moving patient to room near nurses station  Outcome: Progressing     Problem: PAIN - ADULT  Goal: Verbalizes/displays adequate comfort level or baseline comfort level  Description: Interventions:  - Encourage patient to monitor pain and request assistance  - Assess pain using appropriate pain scale  - Administer analgesics based on type and severity of pain and evaluate response  - Implement non-pharmacological measures as appropriate and evaluate response  - Consider cultural and social influences on pain and pain management  - Notify physician/advanced practitioner if interventions unsuccessful or patient reports new pain  Outcome: Progressing     Problem: INFECTION - ADULT  Goal: Absence or prevention of progression during hospitalization  Description: INTERVENTIONS:  - Assess and monitor for signs and symptoms of infection  - Monitor lab/diagnostic results  - Monitor all insertion sites, i e  indwelling lines, tubes, and drains  - Monitor endotracheal if appropriate and nasal secretions for changes in amount and color  - [place] appropriate cooling/warming therapies per order  - Administer medications as ordered  - Instruct and encourage patient and family to use good hand hygiene technique  - Identify and instruct in appropriate isolation precautions for identified infection/condition  Outcome: Progressing  Goal: Absence of fever/infection during neutropenic period  Description: INTERVENTIONS:  - Monitor WBC    Outcome: Progressing     Problem: SAFETY ADULT  Goal: Patient will remain free of falls  Description: INTERVENTIONS:  - Educate patient/family on patient safety including physical limitations  - Instruct patient to call for assistance with activity   - Consult OT/PT to assist with strengthening/mobility   - Keep Call bell within reach  - Keep bed low and locked with side rails adjusted as appropriate  - Keep care items and personal belongings within reach  - Initiate and maintain comfort rounds  - Make Fall Risk Sign visible to staff  - Offer Toileting every 2 Hours, in advance of need  - Initiate/Maintain alarm  - Obtain necessary fall risk management equipment  - Apply yellow socks and bracelet for high fall risk patients  - Consider moving patient to room near nurses station  Outcome: Progressing  Goal: Maintain or return to baseline ADL function  Description: INTERVENTIONS:  - Educate patient/family on patient safety including physical limitations  - Instruct patient to call for assistance with activity   - Consult OT/PT to assist with strengthening/mobility   - Keep Call bell within reach  - Keep bed low and locked with side rails adjusted as appropriate  - Keep care items and personal belongings within reach  - Initiate and maintain comfort rounds  - Make Fall Risk Sign visible to staff  - Offer Toileting every 2 Hours, in advance of need  - Initiate/Maintain alarm  - Obtain necessary fall risk management equipment  - Apply yellow socks and bracelet for high fall risk patients  - Consider moving patient to room near nurses station  Outcome: Progressing  Goal: Maintains/Returns to pre admission functional level  Description: INTERVENTIONS:  - Perform BMAT or MOVE assessment daily    - Set and communicate daily mobility goal to care team and patient/family/caregiver  - Collaborate with rehabilitation services on mobility goals if consulted  - Perform Range of Motion *** times a day  - Reposition patient every *** hours  - Dangle patient *** times a day  - Stand patient *** times a day  - Ambulate patient *** times a day  - Out of bed to chair *** times a day   - Out of bed for meals *** times a day  - Out of bed for toileting  - Record patient progress and toleration of activity level   Outcome: Progressing     Problem: MOBILITY - ADULT  Goal: Maintain or return to baseline ADL function  Description: INTERVENTIONS:  - Educate patient/family on patient safety including physical limitations  - Instruct patient to call for assistance with activity   - Consult OT/PT to assist with strengthening/mobility   - Keep Call bell within reach  - Keep bed low and locked with side rails adjusted as appropriate  - Keep care items and personal belongings within reach  - Initiate and maintain comfort rounds  - Make Fall Risk Sign visible to staff  - Offer Toileting every 2 Hours, in advance of need  - Initiate/Maintain alarm  - Obtain necessary fall risk management equipment  - Apply yellow socks and bracelet for high fall risk patients  - Consider moving patient to room near nurses station  Outcome: Progressing  Goal: Maintains/Returns to pre admission functional level  Description: INTERVENTIONS:  - Perform BMAT or MOVE assessment daily    - Set and communicate daily mobility goal to care team and patient/family/caregiver  - Collaborate with rehabilitation services on mobility goals if consulted  - Perform Range of Motion *** times a day  - Reposition patient every *** hours    - Dangle patient *** times a day  - Stand patient *** times a day  - Ambulate patient *** times a day  - Out of bed to chair *** times a day   - Out of bed for meals *** times a day  - Out of bed for toileting  - Record patient progress and toleration of activity level   Outcome: Progressing     Problem: Prexisting or High Potential for Compromised Skin Integrity  Goal: Skin integrity is maintained or improved  Description: INTERVENTIONS:  - Identify patients at risk for skin breakdown  - Assess and monitor skin integrity  - Assess and monitor nutrition and hydration status  - Monitor labs   - Assess for incontinence   - Turn and reposition patient  - Assist with mobility/ambulation  - Relieve pressure over bony prominences  - Avoid friction and shearing  - Provide appropriate hygiene as needed including keeping skin clean and dry  - Evaluate need for skin moisturizer/barrier cream  - Collaborate with interdisciplinary team   - Patient/family teaching  - Consider wound care consult   Outcome: Progressing     Problem: Nutrition/Hydration-ADULT  Goal: Nutrient/Hydration intake appropriate for improving, restoring or maintaining nutritional needs  Description: Monitor and assess patient's nutrition/hydration status for malnutrition  Collaborate with interdisciplinary team and initiate plan and interventions as ordered  Monitor patient's weight and dietary intake as ordered or per policy  Utilize nutrition screening tool and intervene as necessary  Determine patient's food preferences and provide high-protein, high-caloric foods as appropriate       INTERVENTIONS:  - Monitor oral intake, urinary output, labs, and treatment plans  - Assess nutrition and hydration status and recommend course of action  - Evaluate amount of meals eaten  - Assist patient with eating if necessary   - Allow adequate time for meals  - Recommend/ encourage appropriate diets, oral nutritional supplements, and vitamin/mineral supplements  - Order, calculate, and assess calorie counts as needed  - Recommend, monitor, and adjust tube feedings based on assessed needs  - Assess need for intravenous fluids  - Provide nutrition/hydration education as appropriate  - Include patient/family/caregiver in decisions related to nutrition  Outcome: Progressing

## 2022-08-10 NOTE — ASSESSMENT & PLAN NOTE
· Patient presented with gross hematuria x 1month   Had prostate biopsy in past, last bx 10/2021, cytology negative  Follows with a urologist in [place]    · CT A/P 07/27: asymmetrically enlarged prostate gland; left side is larger  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  Bilateral hydronephrosis likely from obstruction at the UVJ secondary to enlarged/invading prostate  No renal stones     · Status post bilateral percutaneous nephrostomy placement on 7/28  · Hematuria resolving, OP fu with urology  · Monitor H/H : 8 today

## 2022-08-11 LAB
ANION GAP SERPL CALCULATED.3IONS-SCNC: 10 MMOL/L (ref 4–13)
BASOPHILS # BLD AUTO: 0.03 THOUSANDS/ÂΜL (ref 0–0.1)
BASOPHILS NFR BLD AUTO: 0 % (ref 0–1)
BUN SERPL-MCNC: 25 MG/DL (ref 5–25)
CALCIUM SERPL-MCNC: 9 MG/DL (ref 8.3–10.1)
CHLORIDE SERPL-SCNC: 100 MMOL/L (ref 96–108)
CO2 SERPL-SCNC: 25 MMOL/L (ref 21–32)
CREAT SERPL-MCNC: 0.89 MG/DL (ref 0.6–1.3)
EOSINOPHIL # BLD AUTO: 0.25 THOUSAND/ÂΜL (ref 0–0.61)
EOSINOPHIL NFR BLD AUTO: 2 % (ref 0–6)
ERYTHROCYTE [DISTWIDTH] IN BLOOD BY AUTOMATED COUNT: 15.7 % (ref 11.6–15.1)
GFR SERPL CREATININE-BSD FRML MDRD: 84 ML/MIN/1.73SQ M
GLUCOSE SERPL-MCNC: 98 MG/DL (ref 65–140)
HCT VFR BLD AUTO: 26 % (ref 36.5–49.3)
HGB BLD-MCNC: 8.6 G/DL (ref 12–17)
IMM GRANULOCYTES # BLD AUTO: 0.19 THOUSAND/UL (ref 0–0.2)
IMM GRANULOCYTES NFR BLD AUTO: 2 % (ref 0–2)
LYMPHOCYTES # BLD AUTO: 2.18 THOUSANDS/ÂΜL (ref 0.6–4.47)
LYMPHOCYTES NFR BLD AUTO: 21 % (ref 14–44)
MCH RBC QN AUTO: 30 PG (ref 26.8–34.3)
MCHC RBC AUTO-ENTMCNC: 33.1 G/DL (ref 31.4–37.4)
MCV RBC AUTO: 91 FL (ref 82–98)
MONOCYTES # BLD AUTO: 0.97 THOUSAND/ÂΜL (ref 0.17–1.22)
MONOCYTES NFR BLD AUTO: 10 % (ref 4–12)
NEUTROPHILS # BLD AUTO: 6.62 THOUSANDS/ÂΜL (ref 1.85–7.62)
NEUTS SEG NFR BLD AUTO: 65 % (ref 43–75)
NRBC BLD AUTO-RTO: 0 /100 WBCS
PLATELET # BLD AUTO: 273 THOUSANDS/UL (ref 149–390)
PMV BLD AUTO: 11.1 FL (ref 8.9–12.7)
POTASSIUM SERPL-SCNC: 4.6 MMOL/L (ref 3.5–5.3)
RBC # BLD AUTO: 2.87 MILLION/UL (ref 3.88–5.62)
SODIUM SERPL-SCNC: 135 MMOL/L (ref 135–147)
WBC # BLD AUTO: 10.24 THOUSAND/UL (ref 4.31–10.16)

## 2022-08-11 PROCEDURE — 97110 THERAPEUTIC EXERCISES: CPT

## 2022-08-11 PROCEDURE — 80048 BASIC METABOLIC PNL TOTAL CA: CPT | Performed by: FAMILY MEDICINE

## 2022-08-11 PROCEDURE — 97530 THERAPEUTIC ACTIVITIES: CPT

## 2022-08-11 PROCEDURE — 97535 SELF CARE MNGMENT TRAINING: CPT

## 2022-08-11 PROCEDURE — 97116 GAIT TRAINING THERAPY: CPT

## 2022-08-11 PROCEDURE — 99239 HOSP IP/OBS DSCHRG MGMT >30: CPT | Performed by: FAMILY MEDICINE

## 2022-08-11 PROCEDURE — 85025 COMPLETE CBC W/AUTO DIFF WBC: CPT | Performed by: FAMILY MEDICINE

## 2022-08-11 RX ORDER — FINASTERIDE 5 MG/1
5 TABLET, FILM COATED ORAL DAILY
Qty: 30 TABLET | Refills: 0 | Status: SHIPPED | OUTPATIENT
Start: 2022-08-12 | End: 2022-09-11

## 2022-08-11 RX ADMIN — HEPARIN SODIUM 5000 UNITS: 5000 INJECTION INTRAVENOUS; SUBCUTANEOUS at 05:59

## 2022-08-11 RX ADMIN — METOPROLOL SUCCINATE 100 MG: 100 TABLET, EXTENDED RELEASE ORAL at 09:03

## 2022-08-11 RX ADMIN — FINASTERIDE 5 MG: 5 TABLET, FILM COATED ORAL at 09:00

## 2022-08-11 RX ADMIN — TAMSULOSIN HYDROCHLORIDE 0.4 MG: 0.4 CAPSULE ORAL at 17:46

## 2022-08-11 RX ADMIN — DOCUSATE SODIUM 100 MG: 100 CAPSULE, LIQUID FILLED ORAL at 17:46

## 2022-08-11 RX ADMIN — HEPARIN SODIUM 5000 UNITS: 5000 INJECTION INTRAVENOUS; SUBCUTANEOUS at 21:25

## 2022-08-11 RX ADMIN — BICALUTAMIDE 50 MG: 50 TABLET, FILM COATED ORAL at 09:01

## 2022-08-11 RX ADMIN — ALLOPURINOL 300 MG: 300 TABLET ORAL at 09:00

## 2022-08-11 NOTE — OCCUPATIONAL THERAPY NOTE
Occupational Therapy Treatment Note        Patient Name: Vic ARGUETA Date: 8/11/2022 08/11/22 0937   OT Last Visit   OT Visit Date 08/11/22   Note Type   Note Type Treatment   Restrictions/Precautions   Weight Bearing Precautions Per Order No   Other Precautions Chair Alarm; Bed Alarm;Multiple lines; Fall Risk;Pain   Pain Assessment   Pain Assessment Tool 0-10   Pain Score No Pain  (AT REST, "ONLY WHEN iM WALKING")   ADL   Where Assessed Other (Comment)  (standing at sink)   Eating Assistance 5  Supervision/Setup   Eating Deficit Setup; Increased time to complete   Grooming Assistance 5  Supervision/Setup   Grooming Deficit Setup;Supervision/safety; Increased time to complete   UB Bathing Assistance 4  Minimal Assistance   UB Bathing Deficit Setup;Supervision/safety; Increased time to complete   LB Bathing Assistance 3  Moderate Assistance   LB Bathing Deficit Setup;Supervision/safety; Increased time to complete; Buttocks   UB Dressing Assistance 4  Minimal Assistance   UB Dressing Deficit Setup;Verbal cueing   LB Dressing Assistance 2  Maximal Assistance   LB Dressing Deficit Setup;Steadying; Requires assistive device for steadying; Increased time to complete   Toileting Assistance  2  Maximal Assistance   Toileting Deficit Increased time to complete   Functional Standing Tolerance   Time 2-3 minutes x 2 during sink level ADLs   Activity sponge bathing sink side/ grooming standing sink side   Comments requires unilateral UE support   Bed Mobility   Additional Comments received patient OOB to Recliner Chair   Transfers   Sit to Stand 4  Minimal assistance   Additional items Assist x 2; Increased time required;Verbal cues   Stand to Sit 4  Minimal assistance   Additional items Assist x 2; Increased time required   Toilet transfer 3  Moderate assistance   Additional items Assist x 2; Increased time required;Standard toilet;Raised toilet seat   Toilet Transfers   Toilet Transfer From Other (Comment)  (Recliner to sink side to toilet)   Toilet Transfer Type To and from   Toilet Transfer to Raised toilet seat with rails   Toilet Transfer Technique Ambulating   Toilet Transfers Moderate assistance   Therapeutic Exercise - ROM   UE-ROM Yes   ROM- Right Upper Extremities   R Shoulder AROM; Flexion;ABduction; Extension;Horizontal ABduction   R Elbow AROM;Elbow flexion;Elbow extension   R Weight/Reps/Sets 1 set of 10 repetitions   ROM - Left Upper Extremities    L Shoulder AROM; Flexion;ABduction; Extension;Horizontal ABduction   L Elbow AROM;Elbow flexion;Elbow extension   L Weight/Reps/Sets 1 set of 10 repetitions   Cognition   Overall Cognitive Status WFL   Arousal/Participation Alert; Responsive; Cooperative   Attention Attends with cues to redirect   Orientation Level Oriented X4   Memory Within functional limits   Following Commands Follows one step commands with increased time or repetition   Activity Tolerance   Activity Tolerance Patient limited by fatigue   Assessment   Assessment Patient participated in Skilled OT session this date with interventions consisting of ADL re training with the use of correct body mechnaics, Energy Conservation techniques, Work simplification skills , safety awareness and fall prevention techniques, therapeutic exercise to: increase functional use of BUEs, increase BUE muscle strength ,  therapeutic activities to: increase activity tolerance, increase standing tolerance time with unilateral UE support to complete sink level ADLs, increase dynamic sit/ stand balance during functional activity , increase postural control and increase OOB/ sitting tolerance   Patient agreeable to OT treatment session, upon arrival patient was found seated OOB to Recliner, alert, responsive  and in no apparent distress  In comparison to previous session, patient with improvements in overall endurance and standing tolerance time   Patient requires min assist of one for UB ADLs, mod assist for LB ADLs, dependent for lower leg and foot  Patient requiring verbal cues for safety, verbal cues for correct technique and verbal cues for pacing thru activity steps  Patient continues to be functioning below baseline level, occupational performance remains limited secondary to factors listed above and increased risk for falls and injury  From OT standpoint, recommendation at time of d/c would be Short Term Rehab  Patient to benefit from continued Occupational Therapy treatment while in the hospital to address deficits as defined above and maximize level of functional independence with ADLs and functional mobility  Plan   Treatment Interventions ADL retraining;Functional transfer training;UE strengthening/ROM; Endurance training;Equipment evaluation/education;Patient/family training; Compensatory technique education;Continued evaluation; Energy conservation; Activityengagement   Goal Expiration Date 08/25/22   OT Treatment Day 3   OT Frequency 3-5x/wk   Recommendation   OT Discharge Recommendation Post acute rehabilitation services   AM-PAC Daily Activity Inpatient   Lower Body Dressing 2   Bathing 2   Toileting 2   Upper Body Dressing 3   Grooming 3   Eating 4   Daily Activity Raw Score 16   Daily Activity Standardized Score (Calc for Raw Score >=11) 35 96   AM-PAC Applied Cognition Inpatient   Following a Speech/Presentation 3   Understanding Ordinary Conversation 3   Taking Medications 3   Remembering Where Things Are Placed or Put Away 3   Remembering List of 4-5 Errands 3   Taking Care of Complicated Tasks 3   Applied Cognition Raw Score 18   Applied Cognition Standardized Score 38 07

## 2022-08-11 NOTE — ASSESSMENT & PLAN NOTE
· Likely from hematuria   · Required 3 units PRBC transfusion during current admission  · Hemoglobin stable above 7

## 2022-08-11 NOTE — PHYSICAL THERAPY NOTE
Physical Therapy Treatment Note    Patient's Name: Vic Mojica    Admitting Diagnosis  Anemia [D64 9]  Paralysis ([address] ) [G83 9]  Hematuria [R31 9]  Other hydronephrosis [N13 39]  Benign prostatic hyperplasia without lower urinary tract symptoms [N40 0]    Problem List  Patient Active Problem List   Diagnosis    Shortness of breath    Prolonged Q-T interval on ECG    Acute respiratory failure with hypoxia ([address] ) secondary to presumed COVID-19 infection    Suspected COVID-19 virus infection    Hypertension    Encounter to establish care    Medicare annual wellness visit, subsequent    Generalized weakness    Need for vaccination    Cough    Sinus congestion    Gross hematuria    Prostate cancer metastatic to bone [facility])    Acute blood loss anemia    Right shoulder pain        08/11/22 0938   PT Last Visit   PT Visit Date 08/11/22   Note Type   Note Type Treatment   Pain Assessment   Pain Assessment Tool 0-10   Pain Score No Pain   Restrictions/Precautions   Weight Bearing Precautions Per Order No   Braces or Orthoses Other (Comment)  (none per patient)   Other Precautions Chair Alarm; Bed Alarm;Multiple lines; Fall Risk;Pain   General   Chart Reviewed Yes   Response to Previous Treatment Patient with no complaints from previous session  Family/Caregiver Present No   Cognition   Overall Cognitive Status WFL   Arousal/Participation Alert; Responsive; Cooperative   Attention Within functional limits   Orientation Level Oriented X4   Memory Within functional limits   Following Commands Follows one step commands without difficulty   Comments Pt agreeable to PT   Bed Mobility   Additional Comments Pt received at start of session seated in bedside recliner   Transfers   Sit to Stand 4  Minimal assistance   Additional items Assist x 2;Armrests; Increased time required;Verbal cues   Stand to Sit 4  Minimal assistance   Additional items Assist x 2;Armrests; Increased time required;Verbal cues   Toilet transfer 3 Moderate assistance   Additional items Assist x 2;Armrests; Increased time required;Commode   Additional Comments with use of RW; Pt requires up to mod Ax2 for STS for low surfaces   Ambulation/Elevation   Gait pattern Decreased foot clearance; Inconsistent nigel; Short stride; Foward flexed   Gait Assistance 4  Minimal assist   Additional items Assist x 1;Verbal cues   Assistive Device Rolling walker   Distance 60' then 20'   Stair Management Assistance Not tested   Balance   Static Sitting Fair +   Dynamic Sitting Fair   Static Standing Fair -   Dynamic Standing Poor +   Ambulatory Poor +   Endurance Deficit   Endurance Deficit Yes   Endurance Deficit Description decreased activity tolerance   Activity Tolerance   Activity Tolerance Patient limited by fatigue   Nurse Made Aware RN Guerda   Exercises   Hip Flexion Sitting;15 reps;AROM; Bilateral   Hip Abduction Sitting;15 reps;AROM; Bilateral   Knee AROM Long Arc Quad Sitting;15 reps;AROM; Bilateral   Ankle Pumps Sitting;15 reps;AROM; Bilateral   Balance training  Pt completed multiple bouts of standing tolerance activities while participating in ADLs while standing at sink with no more than CG for steadying  Assessment   Prognosis Good   Problem List Decreased strength; Impaired balance;Decreased endurance;Decreased mobility; Decreased cognition;Pain   Assessment Pt seen for PT treatment session this date with interventions consisting of gait training w/ emphasis on improving pt's ability to ambulate level surfaces x 60' then 20' with min A provided by therapist with RW, Therapeutic exercise consisting of: LE exercises performed in seated position and therapeutic activity consisting of training: bed mobility, supine<>sit transfers, sit<>stand transfers and vc and tactile cues for static standing posture faciliation  Pt agreeable to PT treatment session upon arrival, pt found supine in bed w/ HOB elevated, in no apparent distress and responsive   In comparison to previous session, pt with improvements in LE strength, balance and endurance  Post session: pt returned back to recliner, chair alarm engaged, all needs in reach and RN notified of session findings/recommendations  Continue to recommend post acute rehabilitation services at time of d/c in order to maximize pt's functional independence and safety w/ mobility  Pt continues to be functioning below baseline level, and remains limited 2* factors listed above and including continued need for medical management and monitoring, decreased strength and balance resulting in an increased risk for falls, decreased endurance and activity tolerance limiting overall mobility  PT will continue to see pt during current hospitalization in order to address the deficits listed above and provide interventions consistent w/ POC in effort to achieve STGs  Barriers to Discharge Inaccessible home environment  (decline in functional status)   Goals   STG Expiration Date 08/15/22   PT Treatment Day 3   Plan   Treatment/Interventions Functional transfer training;LE strengthening/ROM; Therapeutic exercise; Endurance training;Patient/family training;Gait training;Bed mobility; Compensatory technique education;Spoke to nursing;OT   Progress Progressing toward goals   PT Frequency 4-6x/wk   Recommendation   PT Discharge Recommendation Post acute rehabilitation services   AM-PAC Basic Mobility Inpatient   Turning in Bed Without Bedrails 2   Lying on Back to Sitting on Edge of Flat Bed 2   Moving Bed to Chair 3   Standing Up From Chair 2   Walk in Room 3   Climb 3-5 Stairs 2   Basic Mobility Inpatient Raw Score 14   Basic Mobility Standardized Score 35 55   Highest Level Of Mobility   JH-HLM Goal 4: Move to chair/commode   JH-HLM Achieved 7: Walk 25 feet or more   Education   Education Provided Mobility training;Assistive device   Patient Demonstrates acceptance/verbal understanding   End of Consult   Patient Position at End of Consult Bedside chair; All needs within reach;Bed/Chair alarm activated       Zack Malagon PT    Time In: 09:38  Time Out: 10:19  Total Time: 41 mins

## 2022-08-11 NOTE — DISCHARGE SUMMARY
[facility]  Discharge- [address]  1950, 67 y o  male MRN: [id number]  Unit/Bed#: -[facility] Encounter: [id number]  Primary Care Provider: TRICIA Chavez   Date and time admitted to hospital: 7/27/2022 11:40 PM    Gross hematuria  Assessment & Plan  · Patient presented with gross hematuria x 1month   Had prostate biopsy in past, last bx 10/2021, cytology negative  Follows with a urologist in [place]    · CT A/P 07/27: asymmetrically enlarged prostate gland; left side is larger  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  Bilateral hydronephrosis likely from obstruction at the UVJ secondary to enlarged/invading prostate  No renal stones     · Status post bilateral percutaneous nephrostomy placement on 7/28  · Hematuria resolving, OP fu with urology  · Monitor H/H : 8 6 today    MICA (acute kidney injury) (HCC)-resolved as of 8/8/2022  Assessment & Plan  · Secondary to b/l hydronephrosis  · Presented creatinine 1 5, now resolved s/p nephrostomy tubes/ivf      * Acute blood loss anemia  Assessment & Plan  · Likely from hematuria   · Required 3 units PRBC transfusion during current admission  · Hemoglobin stable above 7    Prostate cancer metastatic to bone [facility])  Assessment & Plan  · CT a/p 7/27: revealed 1 cm nodule in the right middle lobe not seen on the prior exam from 6/22/2020 concerning for metastasis and diffuse sclerosis throughout the osseous pelvis and in the L3, L2, T12, T11, T10, and T8 vertebral bodies also concerning for metastasis   · 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 as well  · Status post IR guided left iliac biopsy on 8/2  · Biopsy revealed prostate cancer  · outpatient follow-up with Oncology    Generalized weakness  Assessment & Plan  · PT/OT recommending rehab  · [facility]      Discharging Physician / Practitioner: Elizabeth Trevizo MD  PCP: Makeda Chavez  Admission Date:   Admission Orders (From admission, onward) Ordered        07/28/22 1507  Inpatient Admission  Once            07/28/22 0347  Place in Observation  Once                      Discharge Date: 08/11/22    Disposition:      · [facility]    Reason for Admission: hematuria    Discharge Diagnoses:     Please see assessment and plan section above for further details regarding discharge diagnoses  Medical Problems             Resolved Problems  Date Reviewed: 8/7/2022          Resolved    MICA (acute kidney injury) ([facility] [address] ) 8/8/2022     Resolved by  Reyna Pace MD                Consultations During Hospital Stay:  · Urology  · Oncology  · IR    Procedures Performed:   · S/P BL nephrostomy tube placement on 7/28     Medication Adjustments and Discharge Medications:  · Summary of Medication Adjustments made as a result of this hospitalization: see med rec  · Medication Dosing Tapers - Please refer to Discharge Medication List for details on any medication dosing tapers (if applicable to patient)  · Medications being temporarily held (include recommended restart time): see med rec  · Discharge Medication List: See after visit summary for reconciled discharge medications  Diet Recommendations at Discharge:  Diet -        Diet Orders   (From admission, onward)             Start     Ordered    08/09/22 1439  Dietary nutrition supplements  Once        Question Answer Comment   Select Supplement: Ensure Enlive-Vanilla    Frequency Dinner        08/09/22 1438    08/02/22 1111  Diet Regular; Regular House  Diet effective now        References:    Nutrtion Support Algorithm Enteral vs  Parenteral   Question Answer Comment   Diet Type Regular    Regular Regular House    RD to adjust diet per protocol?  Yes        08/02/22 1110                Hospital Course:     Rock Ann is a 67 y o  male patient who originally presented to the hospital on 7/27/2022 with underlying history of hypertension/BPH who presents with symptoms of weakness fatigue and hematuria  Symptoms had been ongoing for more than 1 month  No associated abdominal pain nausea vomiting  He has been following outpatient with Urology in [place]  · Patient with history of BPH and prostate biopsy in past approximately 9 months ago with negative cytology and follows outpatient with Urology in [place]  CT abdomen pelvis upon admission noted an asymmetrically enlarged prostate gland left greater than right with posterior aspect of bladder invaded by the prostate gland  Bilateral hydronephrosis likely from obstruction at UVJ secondary to enlarged/invading prostate but no renal stones  Patient underwent bilateral percutaneous nephrostomy tube placement on 07/28/2022 with hematuria now resolving and recommendation for outpatient follow-up with Urology  His hemoglobin has been stable and is currently 8 6  He did received 3 unit packed RBC during hospitalization  · His CT noted evidence of one cm nodule in right middle lobe not seen on previous exam from 06/22/2020 concerning for metastasis and diffuse sclerosis throughout the osseous pelvis and in L3/L2/T12/T11/T10 and T8 vertebral bodies concerning for metastasis  Likely lung Mets as well  Status post IR guided left iliac biopsy on 08/02/2022 with biopsy revealing prostate CA  Outpatient follow-up with his oncologist   · Seen by PT OT with recommendation for acute rehab  [facility]  Patient and daughter updated with plan of care  They verbally stated understanding and are in agreement with the same      Condition at Discharge: fair     Discharge Day Visit / Exam:     Vitals: Blood Pressure: 127/61 (08/11/22 0903)  Pulse: 73 (08/11/22 0903)  Temperature: 98 °F (36 7 °C) (08/11/22 0717)  Temp Source: Oral (08/05/22 1618)  Respirations: 18 (08/11/22 0717)  Height: 5' 10" (177 8 cm) (07/28/22 1612)  Weight - Scale: 93 5 kg (206 lb 2 1 oz) (08/10/22 0600)  SpO2: 100 % (08/11/22 0903)  Exam:   Physical Exam  Constitutional:       General: He is not in acute distress  Appearance: He is obese  He is not toxic-appearing  HENT:      Mouth/Throat:      Mouth: Mucous membranes are moist       Pharynx: Oropharynx is clear  Cardiovascular:      Rate and Rhythm: Normal rate and regular rhythm  Pulses: Normal pulses  Pulmonary:      Effort: Pulmonary effort is normal       Breath sounds: Normal breath sounds  Abdominal:      General: Abdomen is flat  Bowel sounds are normal       Palpations: Abdomen is soft  Tenderness: There is no abdominal tenderness  Musculoskeletal:      Right lower leg: No edema  Left lower leg: No edema  Neurological:      General: No focal deficit present  Mental Status: He is alert and oriented to person, place, and time  Psychiatric:         Mood and Affect: Mood normal          Goals of Care Discussions:  · Code Status at Discharge: Level 1 - Full Code    Discharge instructions/Information to patient and family:   See after visit summary section titled Discharge Instructions for information provided to patient and family  Discharge Statement:  I spent 40 minutes discharging the patient  This time was spent on the day of discharge  I had direct contact with the patient on the day of discharge  Greater than 50% of the total time was spent examining patient, answering all patient questions, arranging and discussing plan of care with patient as well as directly providing post-discharge instructions  Additional time then spent on discharge activities      ** Please Note: This note has been constructed using a voice recognition system **

## 2022-08-11 NOTE — CASE MANAGEMENT
Case Management Progress Note    Patient name Mary Vasquez /-98 MRN [id number]  : 1950 Date 2022       LOS (days): 14  Geometric Mean LOS (GMLOS) (days): 3 50  Days to GMLOS:-10 4        OBJECTIVE:        Current admission status: Inpatient  Preferred Pharmacy:   [address] [place] [address]  Phone: [phone] Fax: [phone]    Primary Care Provider: TRICIA Little    Primary Insurance: MEDICARE [address] [facility]  Secondary Insurance:     PROGRESS NOTE:  CM received message from the  asking CM to call JUANJO Colon at [facility] called Richard at [phone] and she reported CM would need to provide list of denials from all in network facilities, since [address] is out of network, as well as [facility] form  CM informed Richard that as per conversation with Charlie Gomez the other day, there will be no in network facility as patient is out of state and far from home; he cannot return as he truly has no supports that can care for him  Jane Iqbal also reported [facility] is not needed as patient wouldn't be going to a [place] SNF  Richard is going to talk to Jane Iqbal and will call CM back  SAL updated Mabel at [facility] and Dr Basilio Brooks re: the conversation and will continue to follow patient

## 2022-08-11 NOTE — PLAN OF CARE
Problem: PHYSICAL THERAPY ADULT  Goal: Performs mobility at highest level of function for planned discharge setting  See evaluation for individualized goals  Description: Treatment/Interventions: Functional transfer training, LE strengthening/ROM, Therapeutic exercise, Endurance training, Patient/family training, Gait training, Bed mobility, Compensatory technique education, Spoke to nursing, OT          See flowsheet documentation for full assessment, interventions and recommendations  Outcome: Progressing  Note: Prognosis: Good  Problem List: Decreased strength, Impaired balance, Decreased endurance, Decreased mobility, Decreased cognition, Pain  Assessment: Pt seen for PT treatment session this date with interventions consisting of gait training w/ emphasis on improving pt's ability to ambulate level surfaces x 60' then 20' with min A provided by therapist with RW, Therapeutic exercise consisting of: LE exercises performed in seated position and therapeutic activity consisting of training: bed mobility, supine<>sit transfers, sit<>stand transfers and vc and tactile cues for static standing posture faciliation  Pt agreeable to PT treatment session upon arrival, pt found supine in bed w/ HOB elevated, in no apparent distress and responsive  In comparison to previous session, pt with improvements in LE strength, balance and endurance  Post session: pt returned back to recliner, chair alarm engaged, all needs in reach and RN notified of session findings/recommendations  Continue to recommend post acute rehabilitation services at time of d/c in order to maximize pt's functional independence and safety w/ mobility   Pt continues to be functioning below baseline level, and remains limited 2* factors listed above and including continued need for medical management and monitoring, decreased strength and balance resulting in an increased risk for falls, decreased endurance and activity tolerance limiting overall mobility  PT will continue to see pt during current hospitalization in order to address the deficits listed above and provide interventions consistent w/ POC in effort to achieve STGs  Barriers to Discharge: Inaccessible home environment (decline in functional status)     PT Discharge Recommendation: Post acute rehabilitation services    See flowsheet documentation for full assessment

## 2022-08-11 NOTE — ASSESSMENT & PLAN NOTE
· Patient presented with gross hematuria x 1month   Had prostate biopsy in past, last bx 10/2021, cytology negative  Follows with a urologist in [place]    · CT A/P 07/27: asymmetrically enlarged prostate gland; left side is larger  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  Bilateral hydronephrosis likely from obstruction at the UVJ secondary to enlarged/invading prostate  No renal stones     · Status post bilateral percutaneous nephrostomy placement on 7/28  · Hematuria resolving, OP fu with urology  · Monitor H/H : 8 6 today

## 2022-08-11 NOTE — PLAN OF CARE
Problem: OCCUPATIONAL THERAPY ADULT  Goal: Performs self-care activities at highest level of function for planned discharge setting  See evaluation for individualized goals  Description: Treatment Interventions: ADL retraining, Functional transfer training, UE strengthening/ROM, Endurance training, Patient/family training, Compensatory technique education, Energy conservation, Activityengagement          See flowsheet documentation for full assessment, interventions and recommendations  Note: Limitation: Decreased ADL status, Decreased UE ROM, Decreased UE strength, Decreased endurance, Decreased self-care trans, Decreased high-level ADLs  Prognosis: Fair  Assessment: Patient participated in Skilled OT session this date with interventions consisting of ADL re training with the use of correct body mechnaics, Energy Conservation techniques, Work simplification skills , safety awareness and fall prevention techniques, therapeutic exercise to: increase functional use of BUEs, increase BUE muscle strength ,  therapeutic activities to: increase activity tolerance, increase standing tolerance time with unilateral UE support to complete sink level ADLs, increase dynamic sit/ stand balance during functional activity , increase postural control and increase OOB/ sitting tolerance   Patient agreeable to OT treatment session, upon arrival patient was found seated OOB to Recliner, alert, responsive  and in no apparent distress  In comparison to previous session, patient with improvements in overall endurance and standing tolerance time  Patient requires min assist of one for UB ADLs, mod assist for LB ADLs, dependent for lower leg and foot  Patient requiring verbal cues for safety, verbal cues for correct technique and verbal cues for pacing thru activity steps   Patient continues to be functioning below baseline level, occupational performance remains limited secondary to factors listed above and increased risk for falls and injury  From OT standpoint, recommendation at time of d/c would be Short Term Rehab  Patient to benefit from continued Occupational Therapy treatment while in the hospital to address deficits as defined above and maximize level of functional independence with ADLs and functional mobility       OT Discharge Recommendation: Post acute rehabilitation services

## 2022-08-11 NOTE — PLAN OF CARE
Problem: Potential for Falls  Goal: Patient will remain free of falls  Description: INTERVENTIONS:  - Educate patient/family on patient safety including physical limitations  - Instruct patient to call for assistance with activity   - Consult OT/PT to assist with strengthening/mobility   - Keep Call bell within reach  - Keep bed low and locked with side rails adjusted as appropriate  - Keep care items and personal belongings within reach  - Initiate and maintain comfort rounds  - Make Fall Risk Sign visible to staff  - Offer Toileting every 2 Hours, in advance of need  - Initiate/Maintain alarm  - Obtain necessary fall risk management equipment  - Apply yellow socks and bracelet for high fall risk patients  - Consider moving patient to room near nurses station  Outcome: Progressing     Problem: DISCHARGE PLANNING  Goal: Discharge to home or other facility with appropriate resources  Description: INTERVENTIONS:  - Identify barriers to discharge w/patient and caregiver  - Arrange for needed discharge resources and transportation as appropriate  - Identify discharge learning needs (meds, wound care, etc )  - Arrange for interpretive services to assist at discharge as needed  - Refer to Case Management Department for coordinating discharge planning if the patient needs post-hospital services based on physician/advanced practitioner order or complex needs related to functional status, cognitive ability, or social support system  Outcome: Progressing

## 2022-08-11 NOTE — CASE MANAGEMENT
Case Management Progress Note    Patient name Miriam Powell  Location /-62 MRN [id number]  : 1950 Date 2022       LOS (days): 14  Geometric Mean LOS (GMLOS) (days): 3 50  Days to GMLOS:-10 5        OBJECTIVE:        Current admission status: Inpatient  Preferred Pharmacy:   [address] [address]  Phone: [phone] Fax: [phone]    Primary Care Provider: TRICIA Barrera    Primary Insurance: [employer]  Secondary Insurance:     PROGRESS NOTE:  CM received call from Jimmy Vicente at [address]; patient is still approved but [facility] reports he cannot admit to the building until they send [facility] the signed one time contract agreement  She's waiting to see how long they expect this will take but asked CM to cancel 3 pm transport  CM cancelled the trip via [address] transport request role as well as in roundtrip and Hannah acknowledged same  CM also sent TT to [facility] and RN and updated patient at bedside and daughter via phone with the delay in dc  CM agreed to update all parties once she can move forward with arranging transport

## 2022-08-11 NOTE — CASE MANAGEMENT
Case Management Discharge Planning Note    Patient name Joe Diamondhead  Location /-29 MRN [id number]  : 1950 Date 2022       Current Admission Date: 2022  Current Admission Diagnosis:Acute blood loss anemia   Patient Active Problem List    Diagnosis Date Noted   Ruth Villasenor hematuria 2022    Prostate cancer metastatic to bone ([facility] ) 2022    Acute blood loss anemia 2022    Right shoulder pain 2022    Generalized weakness 2020    Need for vaccination 2020    Cough 2020    Sinus congestion 2020    Medicare annual wellness visit, subsequent 2020    Encounter to establish care 06/10/2020    Shortness of breath 2020    Prolonged Q-T interval on ECG 2020    Acute respiratory failure with hypoxia ([facility] ) secondary to presumed COVID-19 infection 2020    Suspected COVID-19 virus infection 2020    Hypertension 2020      LOS (days): 14  Geometric Mean LOS (GMLOS) (days): 3 50  Days to GMLOS:-10 4     OBJECTIVE:  Risk of Unplanned Readmission Score: 19         Current admission status: Inpatient   Preferred Pharmacy:   [place] [address]  Phone: [phone] Fax: [phone]    Primary Care Provider: TRICIA Dorantes    Primary Insurance: MEDICARE [facility]  Secondary Insurance:     DISCHARGE DETAILS:    Discharge planning discussed with[de-identified] pt's dtr via phone  Freedom of Choice: Yes  Comments - Freedom of Choice: CM received TT from Maureen Vicente at [employer] indicating patient has been approved for admission today  SLIM confirms he remains medically cleared for dc  CM called daughter Ramona to relay the news and she is very excited and happy  She is happy with dc today   CM spoke about transportation to f/u appts and offered to move patient's appts to the [facility] for f/u to cut down on transportation costs  She'd prefer to leave the appts as is in [facility] and understands that [facility] will set up [address]  [facility] so that patient can meet her there, which will be an OOP cost  CM to provide update once transport is arranged  Ramona reports her  just went back to work so asked that CM arrange WCV  CM contacted family/caregiver?: Yes  Were Treatment Team discharge recommendations reviewed with patient/caregiver?: Yes  Did patient/caregiver verbalize understanding of patient care needs?: N/A- going to facility  Were patient/caregiver advised of the risks associated with not following Treatment Team discharge recommendations?: Yes    Contacts  Patient Contacts: geraldine Greene  Relationship to Patient[de-identified] Family  Contact Method: Phone  Phone Number: [phone]  Reason/Outcome: Continuity of Care, Referral, Discharge Planning     DME Referral Provided  Referral made for DME?: No    Other Referral/Resources/Interventions Provided:  Interventions: Transportation     Treatment Team Recommendation: Short Term Rehab  Discharge Destination Plan[de-identified] Short Term Rehab ([place])  Transport at Discharge : [facility]  Dispatcher Contacted: Yes (referral placed via roundtrip)  Number/Name of Dispatcher: [facility]  Transported by [employer] and Unit #): TBD  ETA of Transport (Date): 08/11/22     IMM Given (Date):: 08/11/22  IMM Given to[de-identified] Family (verbally reviewed w/ dtr via phone-she expressed understanding of his rights   Original placed for scanning to medical records )

## 2022-08-11 NOTE — NURSING NOTE
RN asked provider if she is okay with pt not having an IV since he is awaiting placement  Provider okay with it and order d/c

## 2022-08-12 VITALS
DIASTOLIC BLOOD PRESSURE: 59 MMHG | HEART RATE: 73 BPM | SYSTOLIC BLOOD PRESSURE: 122 MMHG | WEIGHT: 206.13 LBS | OXYGEN SATURATION: 97 % | BODY MASS INDEX: 29.51 KG/M2 | HEIGHT: 70 IN | RESPIRATION RATE: 20 BRPM | TEMPERATURE: 98.7 F

## 2022-08-12 PROCEDURE — 99239 HOSP IP/OBS DSCHRG MGMT >30: CPT | Performed by: FAMILY MEDICINE

## 2022-08-12 RX ADMIN — FINASTERIDE 5 MG: 5 TABLET, FILM COATED ORAL at 09:09

## 2022-08-12 RX ADMIN — BICALUTAMIDE 50 MG: 50 TABLET, FILM COATED ORAL at 09:10

## 2022-08-12 RX ADMIN — METOPROLOL SUCCINATE 100 MG: 100 TABLET, EXTENDED RELEASE ORAL at 09:09

## 2022-08-12 RX ADMIN — HEPARIN SODIUM 5000 UNITS: 5000 INJECTION INTRAVENOUS; SUBCUTANEOUS at 05:58

## 2022-08-12 RX ADMIN — ALLOPURINOL 300 MG: 300 TABLET ORAL at 09:09

## 2022-08-12 NOTE — NURSING NOTE
Pt discharged to  [facility]  Pt requires assistance for mobility and walker  No new  Skin breakdown  No complaints of pain  IV removed yesterday  Continent of bowel  And nephrostomy tubes intact and patent  Report called to [facility]  Paperwork send with transporter  No questions or concerns at this time

## 2022-08-12 NOTE — CASE MANAGEMENT
Case Management Progress Note    Patient name Lizzy Vasquez /-48 MRN [id number]  : 1950 Date 2022       LOS (days): 15  Geometric Mean LOS (GMLOS) (days): 3 50  Days to GMLOS:-11 3        OBJECTIVE:        Current admission status: Inpatient  Preferred Pharmacy:   [address] [address]  Phone: [phone] Fax: [phone]    Primary Care Provider: TRICIA Todd    Primary Insurance: MEDICARE [facility] [address] [facility]  Secondary Insurance:     PROGRESS NOTE:  Patient was accepted by [facility] for 4 pm WCV transport  [facility] liaison reached out to indicate that they received the signed contract and can admit any time  CM went into Roundtrip to request an earlier  time and [facility] claimed the [facility] for 12 pm  JUANJO GRANADOS and Mabel at [facility] acknowledged same  CM updated patient at bedside and daughter via phone and they are both happy with dc to [address] today

## 2022-08-12 NOTE — CASE MANAGEMENT
Case Management Progress Note    Patient name Chance Gan  Location /-72 MRN [id number]  : 1950 Date 2022       LOS (days): 15  Geometric Mean LOS (GMLOS) (days): 3 50  Days to GMLOS:-11 3        OBJECTIVE:        Current admission status: Inpatient  Preferred Pharmacy:   [address] [address]  Phone: [phone] Fax: [phone]    Primary Care Provider: TRICIA Blount    Primary Insurance: [address]  Secondary Insurance:     PROGRESS NOTE:  CM informed by Clifford Fall that the facility and insurance are negotiating their payment but that everything should be finalized today and she encouraged CM to set up late afternoon transport  WCV requested via Roundtrip for 4 pm  CM did inform SLIM and RN of same and will update family if/when the contract comes through and transportation is a go for today  CM will continue to follow

## 2022-08-12 NOTE — DISCHARGE SUMMARY
[facility]  Discharge- [address]  1950, 67 y o  male MRN: [id number]  Unit/Bed#: -[place] Encounter: [id number]  Primary Care Provider: TRICIA Leroy   Date and time admitted to hospital: 7/27/2022 11:40 PM    Gross hematuria  Assessment & Plan  · Patient presented with gross hematuria x 1month   Had prostate biopsy in past, last bx 10/2021, cytology negative  Follows with a urologist in [place]    · CT A/P 07/27: asymmetrically enlarged prostate gland; left side is larger  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  Bilateral hydronephrosis likely from obstruction at the UVJ secondary to enlarged/invading prostate  No renal stones     · Status post bilateral percutaneous nephrostomy placement on 7/28  · Hematuria resolving, OP fu with urology  · Monitor H/H : 8 6 today    MICA (acute kidney injury) (HCC)-resolved as of 8/8/2022  Assessment & Plan  · Secondary to b/l hydronephrosis  · Presented creatinine 1 5, now resolved s/p nephrostomy tubes/ivf      * Acute blood loss anemia  Assessment & Plan  · Likely from hematuria   · Required 3 units PRBC transfusion during current admission  · Hemoglobin stable above 7    Prostate cancer metastatic to bone [facility])  Assessment & Plan  · CT a/p 7/27: revealed 1 cm nodule in the right middle lobe not seen on the prior exam from 6/22/2020 concerning for metastasis and diffuse sclerosis throughout the osseous pelvis and in the L3, L2, T12, T11, T10, and T8 vertebral bodies also concerning for metastasis   · 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 as well  · Status post IR guided left iliac biopsy on 8/2  · Biopsy revealed prostate cancer  · outpatient follow-up with Oncology    Generalized weakness  Assessment & Plan  · PT/OT recommending rehab  · [facility]      Discharging Physician / Practitioner: Jewel Tuttle MD  PCP: Marychuy Olivas, [address] [place] [address]  Admission Date:   Admission Orders (From admission, onward) Ordered        07/28/22 1507  Inpatient Admission  Once            07/28/22 0347  Place in Observation  Once                      Discharge Date: 08/12/22    Disposition:      · [facility]    Reason for Admission: gross hematuria    Discharge Diagnoses:     Please see assessment and plan section above for further details regarding discharge diagnoses  Medical Problems             Resolved Problems  Date Reviewed: 8/7/2022          Resolved    MICA (acute kidney injury) ([facility] ) 8/8/2022     Resolved by  Perla White MD                Consultations During Hospital Stay:  · Urology  · Oncology  · IR    Procedures Performed:   · S/P BL nephrostomy tube placement on 7/28        Medication Adjustments and Discharge Medications:  · Summary of Medication Adjustments made as a result of this hospitalization: see med rec  · Medication Dosing Tapers - Please refer to Discharge Medication List for details on any medication dosing tapers (if applicable to patient)  · Medications being temporarily held (include recommended restart time): see med rec  · Discharge Medication List: See after visit summary for reconciled discharge medications  Wound Care Recommendations:  When applicable, please see wound care section of After Visit Summary  Diet Recommendations at Discharge:  Diet -        Diet Orders   (From admission, onward)             Start     Ordered    08/09/22 1439  Dietary nutrition supplements  Once        Question Answer Comment   Select Supplement: Ensure Enlive-Vanilla    Frequency Dinner        08/09/22 1438    08/02/22 1111  Diet Regular; Regular House  Diet effective now        References:    Nutrtion Support Algorithm Enteral vs  Parenteral   Question Answer Comment   Diet Type Regular    Regular Regular House    RD to adjust diet per protocol?  Yes        08/02/22 1110                Hospital Course:     Miriam Powell is a 67 y o  male patient who originally presented to the hospital on 7/27/2022 with underlying history of hypertension/BPH who presents with symptoms of weakness fatigue and hematuria  Symptoms had been ongoing for more than 1 month  No associated abdominal pain nausea vomiting  He has been following outpatient with Urology in [place]      · Patient with history of BPH and prostate biopsy in past approximately 9 months ago with negative cytology and follows outpatient with Urology in [place]  CT abdomen pelvis upon admission noted an asymmetrically enlarged prostate gland left greater than right with posterior aspect of bladder invaded by the prostate gland  Bilateral hydronephrosis likely from obstruction at UVJ secondary to enlarged/invading prostate but no renal stones  Patient underwent bilateral percutaneous nephrostomy tube placement on 07/28/2022 with hematuria now resolving and recommendation for outpatient follow-up with Urology  His hemoglobin has been stable and is currently 8 6  He did received 3 unit packed RBC during hospitalization  · His CT noted evidence of one cm nodule in right middle lobe not seen on previous exam from 06/22/2020 concerning for metastasis and diffuse sclerosis throughout the osseous pelvis and in L3/L2/T12/T11/T10 and T8 vertebral bodies concerning for metastasis  Likely lung Mets as well  Status post IR guided left iliac biopsy on 08/02/2022 with biopsy revealing prostate CA  Outpatient follow-up with his oncologist   · Seen by PT OT with recommendation for acute rehab  Patient going to [facility] today  Patient and daughter updated with plan of care  They verbally stated understanding and are in agreement with the same        Condition at Discharge: fair     Discharge Day Visit / Exam:     Vitals: Blood Pressure: 122/59 (08/12/22 0908)  Pulse: 73 (08/12/22 0908)  Temperature: 98 7 °F (37 1 °C) (08/12/22 0908)  Temp Source: Oral (08/05/22 1618)  Respirations: 20 (08/11/22 2157)  Height: 5' 10" (177 8 cm) (07/28/22 1612)  Weight - Scale: 93 5 kg (206 lb 2 1 oz) (08/12/22 0554)  SpO2: 97 % (08/12/22 0908)  Exam:   Physical Exam  Constitutional:       General: He is not in acute distress  Appearance: He is obese  He is not toxic-appearing  HENT:      Mouth/Throat:      Mouth: Mucous membranes are moist       Pharynx: Oropharynx is clear  Cardiovascular:      Rate and Rhythm: Normal rate and regular rhythm  Pulses: Normal pulses  Pulmonary:      Effort: Pulmonary effort is normal       Breath sounds: Normal breath sounds  Abdominal:      General: Abdomen is flat  Bowel sounds are normal       Palpations: Abdomen is soft  Neurological:      Mental Status: He is alert and oriented to person, place, and time  Goals of Care Discussions:  · Code Status at Discharge: Level 1 - Full Code    Discharge instructions/Information to patient and family:   See after visit summary section titled Discharge Instructions for information provided to patient and family  Discharge Statement:  I spent 35 minutes discharging the patient  This time was spent on the day of discharge  I had direct contact with the patient on the day of discharge  Greater than 50% of the total time was spent examining patient, answering all patient questions, arranging and discussing plan of care with patient as well as directly providing post-discharge instructions  Additional time then spent on discharge activities      ** Please Note: This note has been constructed using a voice recognition system **

## 2022-08-12 NOTE — PLAN OF CARE
Problem: SAFETY ADULT  Goal: Maintain or return to baseline ADL function  Description: INTERVENTIONS:  - Educate patient/family on patient safety including physical limitations  - Instruct patient to call for assistance with activity   - Consult OT/PT to assist with strengthening/mobility   - Keep Call bell within reach  - Keep bed low and locked with side rails adjusted as appropriate  - Keep care items and personal belongings within reach  - Initiate and maintain comfort rounds  - Make Fall Risk Sign visible to staff  - Offer Toileting every 2 Hours, in advance of need  - Initiate/Maintain alarm  - Obtain necessary fall risk management equipment  - Apply yellow socks and bracelet for high fall risk patients  - Consider moving patient to room near nurses station  Outcome: Progressing

## 2022-08-15 ENCOUNTER — NURSING HOME VISIT (OUTPATIENT)
Dept: GERIATRICS | Facility: OTHER | Age: 72
End: 2022-08-15
Payer: MEDICARE

## 2022-08-15 VITALS
TEMPERATURE: 96.4 F | SYSTOLIC BLOOD PRESSURE: 124 MMHG | HEART RATE: 62 BPM | DIASTOLIC BLOOD PRESSURE: 70 MMHG | BODY MASS INDEX: 27.84 KG/M2 | OXYGEN SATURATION: 96 % | RESPIRATION RATE: 18 BRPM | WEIGHT: 194 LBS

## 2022-08-15 DIAGNOSIS — R53.1 GENERALIZED WEAKNESS: ICD-10-CM

## 2022-08-15 DIAGNOSIS — I10 PRIMARY HYPERTENSION: ICD-10-CM

## 2022-08-15 DIAGNOSIS — C61 PROSTATE CANCER METASTATIC TO BONE (HCC): Primary | ICD-10-CM

## 2022-08-15 DIAGNOSIS — C79.51 PROSTATE CANCER METASTATIC TO BONE (HCC): Primary | ICD-10-CM

## 2022-08-15 DIAGNOSIS — D62 ACUTE BLOOD LOSS ANEMIA: ICD-10-CM

## 2022-08-15 PROCEDURE — 99306 1ST NF CARE HIGH MDM 50: CPT | Performed by: FAMILY MEDICINE

## 2022-08-15 NOTE — PROGRESS NOTES
[address]  [address] [facility], [address] [facility]  History and Physical    NAME: Joe Smith  AGE: 67 y o  SEX: male [id number]    DATE OF ENCOUNTER: 8/15/2022    Code status:  CPR    Assessment and Plan     1  Prostate cancer metastatic to bone [facility])  - s/p IR guided left iliac biopsy 8/2 with confirmed result  - CT chest showed likely lung metastasis as well  - s/p bilateral percutaneous nephrostomy placement on 7/28 for bilateral hydronephrosis likely from obstruction at UVJ secondary to enlarged/invading prostate  - continue Casodex 50 mg qd for now  - needs close follow-up with Urology and heme-onc  2  Acute blood loss anemia  - likely from hematuria/prostate cancer  - s/p 3 PRBC transfusion in the hospital, repeat Hgb 8 6 (8/11/22)  - monitor vitals and follow-up as O/P with Urology, heme-onc for prostate cancer  3  Generalized weakness  - likely 2/2 to acute blood loss anemia from above  - OT/PT    4  Primary hypertension  - BP stable  - continue metoprolol succinate 50 mg qd      All medications and routine orders were reviewed and updated as needed  Plan discussed with: staff  Chief Complaint     Seen for admission at [address] [place] [address]    History of Present Illness     66 yo with HTN, prostatic cancer with bone metastasis who was who was admitted to [facility] for generalized weakness and hematuria from 7/27 to 8/12  His CT A/P revealed bilateral hydronephrosis likely from obstruction at UVJ secondary to enlarge/invading prostate but no renal stones  The patient underwent bilateral percutaneous nephrostomy tube placement on 7/28/2022  He was found to be severe anemic with Hgb 5 5 for which he received total 3 PRBC during hospital course  His recent Hgb was 8 6 (8/11/22)  He was subsequently discharged to [address] [place] [address] for post-acute rehab and will follow up with Urology and heme-onc as O/P  Today, he denies any pain   No concerns from nursing staff  HISTORY:  Past Medical History:   Diagnosis Date    MICA (acute kidney injury) ([facility] [address] ) 7/28/2022    Gout     Hypertension      Family History   Problem Relation Age of Onset    Diabetes Mother     Hypertension Mother     Mental illness Mother     Heart attack Son     Heart attack Daughter      Social History     Socioeconomic History    Marital status: Single     Spouse name: None    Number of children: None    Years of education: None    Highest education level: None   Occupational History    None   Tobacco Use    Smoking status: Former Smoker     Types: Cigarettes    Smokeless tobacco: Never Used    Tobacco comment: quit 24yrs ago   Vaping Use    Vaping Use: Never used   Substance and Sexual Activity    Alcohol use: Yes    Drug use: Never    Sexual activity: None   Other Topics Concern    None   Social History Narrative    None     Social Determinants of Health     Financial Resource Strain: Not on file   Food Insecurity: Not on file   Transportation Needs: Not on file   Physical Activity: Not on file   Stress: Not on file   Social Connections: Not on file   Intimate Partner Violence: Not on file   Housing Stability: [facility] [address] Unable to Pay for Housing in the Last Year: No    Number of [place] in the Last Year: 2    Unstable Housing in the Last Year: No       Allergies:  No Known Allergies    Review of Systems     Review of Systems   Constitutional: Negative for chills and fever  HENT: Negative for trouble swallowing  Eyes: Negative for pain and visual disturbance  Respiratory: Negative for cough and shortness of breath  Cardiovascular: Negative for chest pain and palpitations  Gastrointestinal: Negative for abdominal pain and vomiting  Musculoskeletal: Negative for arthralgias and back pain  Skin: Negative for color change and rash  Neurological: Negative for headaches  All other systems reviewed and are negative        Medications and orders     All medications reviewed and updated in Nursing Home EMR  Objective     Vitals:   Vitals:    08/15/22 0746   BP: 124/70   Pulse: 62   Resp: 18   Temp: (!) 96 4 °F (35 8 °C)   SpO2: 96%   Weight: 88 kg (194 lb)         Physical Exam  Vitals reviewed  Constitutional:       General: He is not in acute distress  Appearance: He is well-developed  HENT:      Head: Normocephalic  Right Ear: External ear normal       Left Ear: External ear normal       Nose: Nose normal       Mouth/Throat:      Mouth: Mucous membranes are moist    Eyes:      General:         Right eye: No discharge  Left eye: No discharge  Conjunctiva/sclera: Conjunctivae normal    Cardiovascular:      Rate and Rhythm: Normal rate and regular rhythm  Heart sounds: Normal heart sounds  No murmur heard  Pulmonary:      Effort: Pulmonary effort is normal       Breath sounds: Normal breath sounds  No rales  Abdominal:      General: Bowel sounds are normal  There is no distension  Palpations: Abdomen is soft  Tenderness: There is no abdominal tenderness  Comments: B/l percutaneous nephrostomy tubes in place, marco urine in bags  Musculoskeletal:         General: Normal range of motion  Cervical back: Neck supple  Right lower leg: No edema  Left lower leg: No edema  Skin:     General: Skin is warm  Neurological:      Mental Status: He is alert and oriented to person, place, and time  Psychiatric:         Behavior: Behavior normal          Pertinent Laboratory/Diagnostic Studies: The following labs/studies were reviewed please see chart or hospital paperwork for details    Results from last 7 days   Lab Units 08/11/22  0510 08/10/22  0444   WBC Thousand/uL 10 24* 11 34*   HEMOGLOBIN g/dL 8 6* 8 0*   HEMATOCRIT % 26 0* 24 5*   PLATELETS Thousands/uL 273 251   NEUTROS PCT % 65 65   MONOS PCT % 10 11     Results from last 7 days   Lab Units 08/11/22  0510 08/10/22  0444 POTASSIUM mmol/L 4 6 4 4   CHLORIDE mmol/L 100 98   CO2 mmol/L 25 26   BUN mg/dL 25 26*   CREATININE mg/dL 0 89 0 92   CALCIUM mg/dL 9 0 8 9         - Counseling Documentation: patient was counseled regarding: instructions for management and patient and family education

## 2022-08-15 NOTE — UTILIZATION REVIEW
Notification of Discharge   This is a Notification of Discharge from our facility [address]  Please be advised that this patient has been discharge from our facility  Below you will find the admission and discharge date and time including the patients disposition  UTILIZATION REVIEW CONTACT:  Federico Leary  Utilization   Network Utilization Review Department  Phone: [phone] x carefully listen to the prompts  All voicemails are confidential   Email: [email or web address]     PHYSICIAN ADVISORY SERVICES:  FOR PWEZ-LB-EFNB REVIEW - MEDICAL NECESSITY DENIAL  Phone: [phone]  Fax: [phone]  Email: [email or web address]     PRESENTATION DATE: 7/27/2022 11:40 PM  OBERVATION ADMISSION DATE: 07/28/22  INPATIENT ADMISSION DATE: 7/28/22  3:07 PM   DISCHARGE DATE: 8/12/2022  3:46 PM  DISPOSITION: Non [facility] SN[facility]/SNU Non [facility] SN[facility]/TC[facility]/SNU      IMPORTANT INFORMATION:  Send all requests for admission clinical reviews, approved or denied determinations and any other requests to dedicated fax number below belonging to the campus where the patient is receiving treatment   List of dedicated fax numbers:  [address] DENIALS (Administrative/Medical Necessity) [phone]   [address] (Maternity/NICU/Pediatrics) [phone]   Kristen Fines [phone]   [address] [place] [address] [phone]   [address] [place] [address] [phone]   [address] [place], [place] [address] [phone]   [facility]  [phone]   [address] [place] [address], [facility]  [facility] [address] [facility] [phone]

## 2022-08-16 NOTE — H&P ADULT - NS ATTEND AMEND GEN_ALL_CORE FT
[FreeTextEntry1] : Pt was fully explained her diagnosis, treatment plan and goal of treatment today on the phone for 5-10 minutes\par  71 y/o Cymraes speaking Male w/ PMHx of HTN, HLD, BPH and gout presents for cardiac catheterization w/ possible intervention if clinically indicated due to patient's risk factors, abnormal CCTA results, and CCS class 3 anginal/anginal-equivalent symptoms.

## 2022-08-17 ENCOUNTER — NURSING HOME VISIT (OUTPATIENT)
Dept: GERIATRICS | Facility: OTHER | Age: 72
End: 2022-08-17
Payer: MEDICARE

## 2022-08-17 VITALS
OXYGEN SATURATION: 98 % | SYSTOLIC BLOOD PRESSURE: 153 MMHG | WEIGHT: 194 LBS | HEART RATE: 87 BPM | TEMPERATURE: 97.9 F | DIASTOLIC BLOOD PRESSURE: 68 MMHG | BODY MASS INDEX: 27.84 KG/M2 | RESPIRATION RATE: 18 BRPM

## 2022-08-17 DIAGNOSIS — D62 ACUTE BLOOD LOSS ANEMIA: ICD-10-CM

## 2022-08-17 DIAGNOSIS — K59.09 OTHER CONSTIPATION: ICD-10-CM

## 2022-08-17 DIAGNOSIS — C61 PROSTATE CANCER METASTATIC TO BONE (HCC): Primary | ICD-10-CM

## 2022-08-17 DIAGNOSIS — R53.1 GENERALIZED WEAKNESS: ICD-10-CM

## 2022-08-17 DIAGNOSIS — I10 PRIMARY HYPERTENSION: ICD-10-CM

## 2022-08-17 DIAGNOSIS — R31.0 GROSS HEMATURIA: ICD-10-CM

## 2022-08-17 DIAGNOSIS — C79.51 PROSTATE CANCER METASTATIC TO BONE (HCC): Primary | ICD-10-CM

## 2022-08-17 PROCEDURE — 99309 SBSQ NF CARE MODERATE MDM 30: CPT | Performed by: NURSE PRACTITIONER

## 2022-08-17 NOTE — TELEPHONE ENCOUNTER
Called and spoke to Jean from [place] unit at [facility]  Reviewed Kristen ARRIAGA's recommendations  Deann verbalized understanding

## 2022-08-17 NOTE — PROGRESS NOTES
[facility]  [address]elle Mcbride [address]  [phone]  [place]  Code 31 (STR)        NAME: Eleuterio Lima  AGE: 67 y o  SEX: male CODE STATUS: CPR    DATE OF ENCOUNTER: 8/17/2022    Assessment and Plan     1  Prostate cancer metastatic to bone [facility])  Assessment & Plan:  · S/p IR guided left iliac biopsy 8/2 with confirmed result  · CT chest showed  lung metastasis   · s/p bilateral percutaneous nephrostomy placement on 7/28 for bilateral hydronephrosis likely from obstruction at UVJ secondary to enlarged/invading prostate  · Continue Casodex 50 mg Daily  · Continue finasteride and tamsulosin  · OP f/u with Urology and heme-onc  2  Acute blood loss anemia  Assessment & Plan:  · S/p 3 units PRBC inpatient   · Hgb 8 6 on 8/11/22  · Chronic in setting of Prostate ca with mets and hematuria   · continue to monitor       3  Gross hematuria  Assessment & Plan:  · Patient reportedly had gross hematuria x1 month   · Prostate biopsy in the past most recently done 10/2021   · Follows with urologist in [place]   · CT of abdomen and pelvis 7/27/2022 with asymmetrically enlarged prostate gland left side is larger, 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, bilateral hydronephrosis likely from obstruction, no renal stones  · S/p bilateral percutaneous nephrostomy placed 728   · Hematuria was resolving prior to discharge to short-term rehab  · Continue outpatient follow-up with Urology  · Monitor H&H      4  Primary hypertension  Assessment & Plan:  · /68 today   · Continue to monitor   · Continue metoprolol succinate 50 mg daily      5  Generalized weakness  Assessment & Plan:  Multifactorial  Continue PT/OT  Fall Precautions  Ensure adequate nutrition/hydration   Monitor CBC/BMP    following for d/c planning         6   Other constipation  Assessment & Plan:  · No documented bowel movement x3 days at SNF   · Known history of prostate CA recently requiring nephrostomy tubes   · Recommend bowel regimen to prevent constipation  · MiraLax, Senna, Colace          All medications and routine orders were reviewed and updated as needed  Chief Complaint     STR follow up visit    Past Medical and Surgica History      Past Medical History:   Diagnosis Date    MICA [facility]acute kidney injury) ([facility] [address] ) 7/28/2022    Gout     Hypertension      Past Surgical History:   Procedure Laterality Date    IR BIOPSY BONE  8/2/2022    IR NEPHROSTOMY TUBE PLACEMENT  7/28/2022     No Known Allergies       History of Present Illness     Kate Henderson is a 67year old male admitted to [place] on 8/12/2022 for STR  Past Medical Hx including but not limited to HTN, prostatic cancer with bone metastasis, ambulatory dysfunction seen in collaboration with nursing for medical mgmt and STR follow up  Hospital Course:   Presented to [facility] 7/27/2022 with history of prostate cancer with bone Mets he presented with generalized weakness and hematuria x1 month  CT of abdomen and pelvis revealed bilateral hydronephrosis due to obstruction from enlarging/invading prostate  No renal stones  S/p bilateral percutaneous nephrostomy tube placement on 7/28/2022  Patient was severely anemic with a hemoglobin of 5 5 s/p 3 units PRBC  His discharging hemoglobin was 8 6  He was recommended to monitor blood counts and recommended short-term rehab due to his generalized weakness  Patient should follow with his hematologist/oncologist and Urology  Rehab:   Seen and examined at bedside today  Patient is a reliable historian  He is mostly [place] speaking with some english  Patient states he feels weak and tired, he states therapy is "good"  He denies any pain currently, he states he is eating well and drinking water  He states he is constipated and has not had BM in 3 days  He denies any urinary complaints   Per review of SNF records, Patient is eating 2-3 meals per day, consuming  %  Last documented BM was 8/14/2022  Patient is actively participating in therapy  No concerns from nursing at this time  The patient's allergies, past medical, surgical, social and family history were reviewed and unchanged  Review of Systems     Review of Systems   Constitutional: Positive for fatigue  Negative for activity change, appetite change and fever  HENT: Negative for ear pain, rhinorrhea and trouble swallowing  Eyes: Negative for pain and visual disturbance  Respiratory: Negative for cough, shortness of breath and wheezing  Cardiovascular: Negative for chest pain and palpitations  Gastrointestinal: Positive for constipation  Negative for abdominal distention, abdominal pain, blood in stool, diarrhea, nausea and vomiting  Genitourinary: Negative for decreased urine volume, difficulty urinating, dysuria, frequency and hematuria  Musculoskeletal: Positive for gait problem  Negative for arthralgias and back pain  Skin: Negative for color change and rash  Neurological: Positive for weakness  Negative for dizziness, syncope, light-headedness, numbness and headaches  Psychiatric/Behavioral: Negative for dysphoric mood and sleep disturbance  Objective     Vitals:   Vitals:    08/17/22 1229   BP: 153/68   Pulse: 87   Resp: 18   Temp: 97 9 °F (36 6 °C)   SpO2: 98%         Physical Exam  Vitals and nursing note reviewed  Constitutional:       General: He is not in acute distress  Appearance: Normal appearance  He is ill-appearing  HENT:      Head: Normocephalic and atraumatic  Nose: No congestion or rhinorrhea  Mouth/Throat:      Mouth: Mucous membranes are dry  Eyes:      General: No scleral icterus  Extraocular Movements: Extraocular movements intact  Conjunctiva/sclera: Conjunctivae normal       Pupils: Pupils are equal, round, and reactive to light     Cardiovascular:      Rate and Rhythm: Normal rate and regular rhythm  Pulses: Normal pulses  Heart sounds: Normal heart sounds  No murmur heard  Pulmonary:      Effort: Pulmonary effort is normal       Breath sounds: Normal breath sounds  No wheezing, rhonchi or rales  Abdominal:      General: Bowel sounds are normal  There is no distension  Palpations: Abdomen is soft  Tenderness: There is no abdominal tenderness  Genitourinary:     Comments: B/L Nephrostomy tubes - draining marco urine  Musculoskeletal:         General: No swelling or signs of injury  Lymphadenopathy:      Cervical: No cervical adenopathy  Skin:     General: Skin is warm and dry  Findings: No erythema  Neurological:      Mental Status: He is alert and oriented to person, place, and time  Mental status is at baseline  Sensory: No sensory deficit  Motor: Weakness present  Gait: Gait abnormal    Psychiatric:         Mood and Affect: Mood normal          Behavior: Behavior normal          Pertinent Laboratory/Diagnostic Studies:   Reviewed in facility chart-stable      Current Medications   Medications reviewed and updated see facility [facility] for details        Current Outpatient Medications:     allopurinol (ZYLOPRIM) 100 mg tablet, Take 300 mg by mouth daily , Disp: , Rfl:     bicalutamide (CASODEX) 50 mg tablet, Take 1 tablet (50 mg total) by mouth daily for 30 doses, Disp: 30 tablet, Rfl: 0    finasteride (PROSCAR) 5 mg tablet, Take 1 tablet (5 mg total) by mouth daily, Disp: 30 tablet, Rfl: 0    fluticasone (FLONASE) 50 mcg/act nasal spray, 1 spray into each nostril daily, Disp: 1 Bottle, Rfl: 0    metoprolol succinate (TOPROL-XL) 50 mg 24 hr tablet, Take 2 tablets (100 mg total) by mouth daily, Disp: 10 tablet, Rfl: 0    pantoprazole (PROTONIX) 40 mg tablet, Take 1 tablet (40 mg total) by mouth daily, Disp: 30 tablet, Rfl: 0    simvastatin (ZOCOR) 10 mg tablet, Take 2 tablets (20 mg total) by mouth daily at bedtime (Patient not taking: Reported on 7/28/2022), Disp: 30 tablet, Rfl: 3    sodium chloride, PF, 0 9 %, 10 mL by Intracatheter route daily Intracatheter flushing daily  May substitute prefilled syringe with normal saline 10 mL vials, 10 mL syringes, and 18 g blunt needles, Disp: 900 mL, Rfl: 1    sodium chloride, PF, 0 9 %, 10 mL by Intracatheter route daily Intracatheter flushing daily  May substitute prefilled syringe with normal saline 10 mL vials, 10 mL syringes, and 18 g blunt needles, Disp: 900 mL, Rfl: 2    tamsulosin (FLOMAX) 0 4 mg, Take 0 4 mg by mouth daily with dinner, Disp: , Rfl:      Plan discussed with Dr Louise Ko noted agreement with assessment and plan  Please note:  Voice-recognition software may have been used in the preparation of this document  Occasional wrong word or "sound-alike" substitutions may have occurred due to the inherent limitations of voice recognition software  Interpretation should be guided by TRICIA Rubin  8/17/2022  12:31 PM

## 2022-08-17 NOTE — ASSESSMENT & PLAN NOTE
· S/p IR guided left iliac biopsy 8/2 with confirmed result  · CT chest showed  lung metastasis   · s/p bilateral percutaneous nephrostomy placement on 7/28 for bilateral hydronephrosis likely from obstruction at UVJ secondary to enlarged/invading prostate  · Continue Casodex 50 mg Daily  · Continue finasteride and tamsulosin  · OP f/u with Urology and heme-onc

## 2022-08-17 NOTE — ASSESSMENT & PLAN NOTE
· S/p 3 units PRBC inpatient   · Hgb 8 6 on 8/11/22  · Chronic in setting of Prostate ca with mets and hematuria   · continue to monitor

## 2022-08-17 NOTE — TELEPHONE ENCOUNTER
Spoke with [facility] unit at [facility]  The relayed that they have orders to flush the nephrotomies and have been since he arrived to them  They have both been flushing with no issues  Despite ease of flushing, the L side doesn't drain anything and the R side drained about 400 ml last time it was emptied

## 2022-08-17 NOTE — TELEPHONE ENCOUNTER
Spoke with pts daughter - she stated she would like to know if this f/u is regarding the nephrostomy tubes- he is in [facility] in [place] [facility] and one Nephrostomy bags is completley dry and the other has liquid in it - wonders if he should come in sooner to Urology

## 2022-08-17 NOTE — ASSESSMENT & PLAN NOTE
· Patient reportedly had gross hematuria x1 month   · Prostate biopsy in the past most recently done 10/2021   · Follows with urologist in [place]   · CT of abdomen and pelvis 7/27/2022 with asymmetrically enlarged prostate gland left side is larger, 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, bilateral hydronephrosis likely from obstruction, no renal stones  · S/p bilateral percutaneous nephrostomy placed 728   · Hematuria was resolving prior to discharge to short-term rehab  · Continue outpatient follow-up with Urology  · Monitor H&H

## 2022-08-17 NOTE — TELEPHONE ENCOUNTER
Recommend attempting to flush nephrostomies for patency if they have this available   If no urine output via nephrostomy, would recommend he go to ED and may need a tube check with IR

## 2022-08-17 NOTE — TELEPHONE ENCOUNTER
There is a chance all the urine could be diverting to one side  I would recommend watching his I's & O's closely  If overall urine output in minimal or if he starts to have flank pain or fever, would recommend ED

## 2022-08-17 NOTE — ASSESSMENT & PLAN NOTE
Multifactorial  Continue PT/OT  Fall Precautions  Ensure adequate nutrition/hydration   Monitor CBC/BMP    following for d/c planning

## 2022-08-17 NOTE — ASSESSMENT & PLAN NOTE
· No documented bowel movement x3 days at SNF   · Known history of prostate CA recently requiring nephrostomy tubes   · Recommend bowel regimen to prevent constipation  · MiraLax, Senna, Colace

## 2022-08-18 DIAGNOSIS — C61 PROSTATE CANCER (HCC): Primary | ICD-10-CM

## 2022-08-23 ENCOUNTER — TELEPHONE (OUTPATIENT)
Dept: SURGICAL ONCOLOGY | Facility: CLINIC | Age: 72
End: 2022-08-23

## 2022-08-23 ENCOUNTER — TELEPHONE (OUTPATIENT)
Dept: HEMATOLOGY ONCOLOGY | Facility: CLINIC | Age: 72
End: 2022-08-23

## 2022-08-23 NOTE — TELEPHONE ENCOUNTER
After receiving notification via email from Virgilio Little, Practice Administrator regarding the cancellation of this patients NM test that is scheduled for today (8/23) as the he has insurance coverage for [place] and not [place], I spoke with the patients daughter and advised her of the situation regarding the out of state insurance and that we cancelled the NM testing for today  She voiced understanding  She did advise that her dad is presently at [address] and will be moving in with her in [facility] once he is rehabilitated  She is asking if someone could please reach out to her to help get him PA assistance  I advised her I would reach out to our finance department so they could help assist in the process of obtaining PA assistance  She voiced understanding and appreciation  Kwaku Ivan notified via email and will have a team member reach out to the patients daughter

## 2022-08-23 NOTE — TELEPHONE ENCOUNTER
08/23/22  Called the patient's daughter Ramona and left a message on voicemail with call back# [phone]   If she can contact [facility] [address] where her father is currently have a  assist with the transition of [address] [address] [facility] to [facility]

## 2022-08-24 ENCOUNTER — NURSING HOME VISIT (OUTPATIENT)
Dept: GERIATRICS | Facility: OTHER | Age: 72
End: 2022-08-24
Payer: MEDICARE

## 2022-08-24 VITALS
OXYGEN SATURATION: 94 % | HEART RATE: 66 BPM | RESPIRATION RATE: 18 BRPM | SYSTOLIC BLOOD PRESSURE: 88 MMHG | WEIGHT: 189 LBS | DIASTOLIC BLOOD PRESSURE: 60 MMHG | TEMPERATURE: 97.3 F | BODY MASS INDEX: 27.12 KG/M2

## 2022-08-24 DIAGNOSIS — C61 PROSTATE CANCER METASTATIC TO BONE (HCC): ICD-10-CM

## 2022-08-24 DIAGNOSIS — K59.09 OTHER CONSTIPATION: ICD-10-CM

## 2022-08-24 DIAGNOSIS — D62 ACUTE BLOOD LOSS ANEMIA: ICD-10-CM

## 2022-08-24 DIAGNOSIS — R53.1 GENERALIZED WEAKNESS: ICD-10-CM

## 2022-08-24 DIAGNOSIS — I10 PRIMARY HYPERTENSION: Primary | ICD-10-CM

## 2022-08-24 DIAGNOSIS — C79.51 PROSTATE CANCER METASTATIC TO BONE (HCC): ICD-10-CM

## 2022-08-24 PROBLEM — J96.01 ACUTE RESPIRATORY FAILURE WITH HYPOXIA (HCC): Status: RESOLVED | Noted: 2020-05-02 | Resolved: 2022-08-24

## 2022-08-24 PROBLEM — R09.81 SINUS CONGESTION: Status: RESOLVED | Noted: 2020-07-07 | Resolved: 2022-08-24

## 2022-08-24 PROCEDURE — 99309 SBSQ NF CARE MODERATE MDM 30: CPT | Performed by: NURSE PRACTITIONER

## 2022-08-24 NOTE — ASSESSMENT & PLAN NOTE
Spoke with patient regarding MOW. She stated the meals starting coming in last week and is very happy with them. Patient was asked if there was anything we can do for her or any questions we could answer. She stated she was doing well and thanked us for following up with her.     Chau Gupta, [address]   Care Coordination Transition  · S/p 3 units PRBC inpatient likely due to hx of prostate ca with mets and gross hematuria upon presentation to ED   · Hgb 8 6- 8 4 at SNF upon review   · Stable

## 2022-08-24 NOTE — ASSESSMENT & PLAN NOTE
Multifactorial  Continue PT/OT  Fall Precautions  Ensure adequate nutrition/hydration   Monitor CBC/BMP - has been stable at [address] following for d/c planning

## 2022-08-24 NOTE — PROGRESS NOTES
[facility]  Janelle Mcbride [address]  [phone]  [place]  Code 31 (STR)        NAME: Daniel Wilkes  AGE: 67 y o  SEX: male CODE STATUS: CPR    DATE OF ENCOUNTER: 8/24/2022    Assessment and Plan     1  Primary hypertension  Assessment & Plan:  · BP low today 88/60 HR 66  · Nurse held Metoprolol today  · Reveiwed BP log- SBP ranging 120-150s on average  · Continue Metoprolol Succ 50mg Daily with hold parameters       2  Prostate cancer metastatic to bone [facility])  Assessment & Plan:  · S/p IR guided left iliac biopsy 8/2 with confirmed result  · CT chest showed  lung metastasis   · s/p bilateral percutaneous nephrostomy placement on 7/28 for bilateral hydronephrosis likely from obstruction at UVJ secondary to enlarged/invading prostate  · Continue Casodex 50 mg Daily  · Continue finasteride and tamsulosin  · OP f/u with Urology and heme-onc  3  Acute blood loss anemia  Assessment & Plan:  · S/p 3 units PRBC inpatient likely due to hx of prostate ca with mets and gross hematuria upon presentation to ED   · Hgb 8 6- 8 4 at SNF upon review   · Stable        4  Generalized weakness  Assessment & Plan:  Multifactorial  Continue PT/OT  Fall Precautions  Ensure adequate nutrition/hydration   Monitor CBC/BMP - has been stable at [address] following for d/c planning         5  Other constipation  Assessment & Plan:  · Having regular BM's on bowel regimen   · Continue SenoKot S 1 tab Q HS, MiraLax daily and PRN Dulcolax Suppository          All medications and routine orders were reviewed and updated as needed      Chief Complaint     STR follow up visit    Past Medical and Surgica History      Past Medical History:   Diagnosis Date    MICA (acute kidney injury) ([facility] [address] ) 7/28/2022    Gout     Hypertension      Past Surgical History:   Procedure Laterality Date    IR BIOPSY BONE  8/2/2022    IR NEPHROSTOMY TUBE PLACEMENT  7/28/2022     No Known Allergies       History of Present Illness     Jennifer Ramirez is a 67year old male admitted to [address] [place] [address] on 8/12/2022 for STR  Past Medical Hx including but not limited to HTN, prostatic cancer with bone metastasis, ambulatory dysfunction seen in collaboration with nursing for medical mgmt and STR follow up  Hospital Course:   Presented to [facility] 7/27/2022 with history of prostate cancer with bone Mets he presented with generalized weakness and hematuria x1 month  CT of abdomen and pelvis revealed bilateral hydronephrosis due to obstruction from enlarging/invading prostate  No renal stones  S/p bilateral percutaneous nephrostomy tube placement on 7/28/2022  Patient was severely anemic with a hemoglobin of 5 5 s/p 3 units PRBC  His discharging hemoglobin was 8 6  He was recommended to monitor blood counts and recommended short-term rehab due to his generalized weakness  Patient should follow with his hematologist/oncologist and Urology  Rehab:   Seen and examined at bedside today  Patient is a reliable historian  He is mostly [place] speaking with some english  Patient is oob in w/c for visit  Patient states he feels "good", He states therapy is going well, he is walking more and feeling less weak, he states he still feels tired  He denies any pain currently, he states he is eating well and drinking water  He states he is having regular BMs now  He states he is going home next week  He denies any issues with his b/l nephrostomy tubes  Per review of SNF records, Patient is eating 2-3 meals per day, consuming  %  Last documented BM was 8/23/2022  Patient is actively participating in therapy, he requreis assistance x1 with ADLs/IADls, he transfers and ambulates with assist and RW  No concerns from nursing at this time  The patient's allergies, past medical, surgical, social and family history were reviewed and unchanged      Review of Systems     Review of Systems   Constitutional: Positive for fatigue  Negative for activity change, appetite change and fever  HENT: Negative for ear pain, rhinorrhea and trouble swallowing  Eyes: Negative for pain and visual disturbance  Respiratory: Negative for cough, shortness of breath and wheezing  Cardiovascular: Negative for chest pain and palpitations  Gastrointestinal: Negative for abdominal distention, abdominal pain, blood in stool, constipation, diarrhea, nausea and vomiting  Genitourinary: Negative for decreased urine volume, difficulty urinating, dysuria, frequency and hematuria  Musculoskeletal: Negative for arthralgias, back pain and gait problem  Skin: Negative for color change and rash  Neurological: Negative for dizziness, syncope, weakness, light-headedness, numbness and headaches  Psychiatric/Behavioral: Negative for dysphoric mood and sleep disturbance  Objective     Vitals:   Vitals:    08/24/22 1019   BP: (!) 88/60   Pulse: 66   Resp: 18   Temp: (!) 97 3 °F (36 3 °C)   SpO2: 94%         Physical Exam  Vitals and nursing note reviewed  Constitutional:       General: He is not in acute distress  Appearance: Normal appearance  He is not ill-appearing  HENT:      Head: Normocephalic and atraumatic  Nose: No congestion or rhinorrhea  Mouth/Throat:      Mouth: Mucous membranes are dry  Eyes:      General: No scleral icterus  Extraocular Movements: Extraocular movements intact  Conjunctiva/sclera: Conjunctivae normal       Pupils: Pupils are equal, round, and reactive to light  Cardiovascular:      Rate and Rhythm: Normal rate and regular rhythm  Pulses: Normal pulses  Heart sounds: Normal heart sounds  No murmur heard  Pulmonary:      Effort: Pulmonary effort is normal       Breath sounds: Normal breath sounds  No wheezing, rhonchi or rales  Abdominal:      General: Bowel sounds are normal  There is no distension  Palpations: Abdomen is soft  Tenderness:  There is no abdominal tenderness  Genitourinary:     Comments: B/L Nephrostomy tubes  draining marco urine from Right  yellow/pink with minimal output from Left    Musculoskeletal:         General: No swelling or signs of injury  Lymphadenopathy:      Cervical: No cervical adenopathy  Skin:     General: Skin is warm and dry  Findings: No erythema  Neurological:      Mental Status: He is alert and oriented to person, place, and time  Mental status is at baseline  Sensory: No sensory deficit  Motor: Weakness present  Gait: Gait abnormal    Psychiatric:         Mood and Affect: Mood normal          Behavior: Behavior normal          Pertinent Laboratory/Diagnostic Studies:   Reviewed in facility chart-stable      Current Medications   Medications reviewed and updated see facility [facility] for details  Current Outpatient Medications:     allopurinol (ZYLOPRIM) 100 mg tablet, Take 300 mg by mouth daily , Disp: , Rfl:     bicalutamide (CASODEX) 50 mg tablet, Take 1 tablet (50 mg total) by mouth daily for 30 doses, Disp: 30 tablet, Rfl: 0    finasteride (PROSCAR) 5 mg tablet, Take 1 tablet (5 mg total) by mouth daily, Disp: 30 tablet, Rfl: 0    fluticasone (FLONASE) 50 mcg/act nasal spray, 1 spray into each nostril daily, Disp: 1 Bottle, Rfl: 0    metoprolol succinate (TOPROL-XL) 50 mg 24 hr tablet, Take 2 tablets (100 mg total) by mouth daily, Disp: 10 tablet, Rfl: 0    pantoprazole (PROTONIX) 40 mg tablet, Take 1 tablet (40 mg total) by mouth daily, Disp: 30 tablet, Rfl: 0    simvastatin (ZOCOR) 10 mg tablet, Take 2 tablets (20 mg total) by mouth daily at bedtime (Patient not taking: Reported on 7/28/2022), Disp: 30 tablet, Rfl: 3    sodium chloride, PF, 0 9 %, 10 mL by Intracatheter route daily Intracatheter flushing daily   May substitute prefilled syringe with normal saline 10 mL vials, 10 mL syringes, and 18 g blunt needles, Disp: 900 mL, Rfl: 1    sodium chloride, PF, 0 9 %, 10 mL by Intracatheter route daily Intracatheter flushing daily  May substitute prefilled syringe with normal saline 10 mL vials, 10 mL syringes, and 18 g blunt needles, Disp: 900 mL, Rfl: 2    tamsulosin (FLOMAX) 0 4 mg, Take 0 4 mg by mouth daily with dinner, Disp: , Rfl:      Plan discussed with Dr Darline Chandler noted agreement with assessment and plan  Please note:  Voice-recognition software may have been used in the preparation of this document  Occasional wrong word or "sound-alike" substitutions may have occurred due to the inherent limitations of voice recognition software  Interpretation should be guided by context           TRICIA Peace  8/24/2022  10:10 AM

## 2022-08-24 NOTE — ASSESSMENT & PLAN NOTE
· Having regular BM's on bowel regimen   · Continue SenoKot S 1 tab Q HS, MiraLax daily and PRN Dulcolax Suppository

## 2022-08-24 NOTE — ASSESSMENT & PLAN NOTE
· BP low today 88/60 HR 66  · Nurse held Metoprolol today  · Reveiwed BP log- SBP ranging 120-150s on average  · Continue Metoprolol Succ 50mg Daily with hold parameters

## 2022-08-29 ENCOUNTER — NURSING HOME VISIT (OUTPATIENT)
Dept: GERIATRICS | Facility: OTHER | Age: 72
End: 2022-08-29
Payer: MEDICARE

## 2022-08-29 DIAGNOSIS — N99.528: ICD-10-CM

## 2022-08-29 DIAGNOSIS — C61 PROSTATE CANCER METASTATIC TO BONE (HCC): ICD-10-CM

## 2022-08-29 DIAGNOSIS — D62 ACUTE BLOOD LOSS ANEMIA: ICD-10-CM

## 2022-08-29 DIAGNOSIS — K59.09 OTHER CONSTIPATION: ICD-10-CM

## 2022-08-29 DIAGNOSIS — M1A.9XX0 CHRONIC GOUT WITHOUT TOPHUS, UNSPECIFIED CAUSE, UNSPECIFIED SITE: ICD-10-CM

## 2022-08-29 DIAGNOSIS — I10 PRIMARY HYPERTENSION: Primary | ICD-10-CM

## 2022-08-29 DIAGNOSIS — C79.51 PROSTATE CANCER METASTATIC TO BONE (HCC): ICD-10-CM

## 2022-08-29 DIAGNOSIS — R53.1 GENERALIZED WEAKNESS: ICD-10-CM

## 2022-08-29 PROCEDURE — 99316 NF DSCHRG MGMT 30 MIN+: CPT | Performed by: NURSE PRACTITIONER

## 2022-08-29 NOTE — PROGRESS NOTES
[facility]  [address]  [phone]  [address] [facility]: [place]  Code 31    NAME: Sidney Rosario  AGE: 67 y o  SEX: male   CODE STATUS: CPR    DATE OF ADMISSION: 8/12/2022   DATE OF DISCHARGE: 8/31/2022   DISCHARGE DISPOSITION: Stable for discharge to home with family support and home health PT/OT/SN services  Reason for Admission: Patient was admitted to [facility] for rehabilitation after hospitalization for generalized weakness and hematuria x1 month  Past Medical and Surgical History:   Past Medical History:   Diagnosis Date    Acute respiratory failure with hypoxia ([address] ) secondary to presumed COVID-19 infection 5/2/2020    MICA (acute kidney injury) ([facility] ) 7/28/2022    Gout     Hypertension     Sinus congestion 7/7/2020      Past Surgical History:   Procedure Laterality Date    IR BIOPSY BONE  8/2/2022    IR NEPHROSTOMY TUBE PLACEMENT  7/28/2022       Course of stay:   Snehal Rubin is a 67year old male admitted to [facility] on 8/12/2022 for STR  Past Medical Hx including but not limited to HTN, prostatic cancer with bone metastasis, ambulatory dysfunction seen in collaboration with nursing for medical mgmt and discharge visit  Hospital Course:   Presented to [facility] 7/27/2022 with history of prostate cancer with bone Mets he presented with generalized weakness and hematuria x1 month  CT of abdomen and pelvis revealed bilateral hydronephrosis due to obstruction from enlarging/invading prostate  No renal stones  S/p bilateral percutaneous nephrostomy tube placement on 7/28/2022  Patient was severely anemic with a hemoglobin of 5 5 s/p 3 units PRBC  His discharging hemoglobin was 8 6  He was recommended to monitor blood counts and recommended short-term rehab due to his generalized weakness  Patient should follow with his hematologist/oncologist and Urology        Rehab:   Seen and examined at bedside today  Patient is a reliable historian  He is mostly [place] speaking with some english  Patient is resting comfortably in bed  Patient denies any pain on exam   Patient is requesting to have his nephrostomy tubes flushed  Patient states he has been ambulating with a rolling walker per therapy he has been able to ambulate 150 ft, he has been able to navigate stairs with 1 assistance  Patient does complain of some fatigue, states he is sleeping at night  Patient denies any chest pain or shortness of breath  He is % on room air  Patient states he still feels weak  Patient states he is eating well staying hydrated  Patient denies any constipation  Patient has good output from right nephrostomy minimal output from left nephrostomy  Per  patient's representative is appealing the discharge  If patient loses his appeal he will go home on hospice services with [facility]  Per review of SNF records, Patient is eating 2-3 meals per day, consuming  %  Last documented BM was 8/27/2022  Patient is actively participating in therapy, he requreis assistance x1 with ADLs/IADls, he transfers and ambulates with assist and RW  No concerns from nursing at this time  ROS:  Review of Systems   Constitutional: Positive for fatigue  Negative for activity change, appetite change and fever  HENT: Negative for ear pain, rhinorrhea and trouble swallowing  Eyes: Negative for pain and visual disturbance  Respiratory: Negative for cough, shortness of breath and wheezing  Cardiovascular: Negative for chest pain and palpitations  Gastrointestinal: Negative for abdominal distention, abdominal pain, blood in stool, constipation, diarrhea, nausea and vomiting  Genitourinary: Negative for decreased urine volume, difficulty urinating, dysuria, frequency and hematuria  Musculoskeletal: Negative for arthralgias, back pain and gait problem  Skin: Negative for color change and rash  Neurological: Negative for dizziness, syncope, weakness, light-headedness, numbness and headaches  Psychiatric/Behavioral: Negative for dysphoric mood and sleep disturbance  PHYSICAL EXAM:  VITALS:   Vitals:    08/29/22 1442   BP: 118/74   Pulse: 75   Resp: 18   Temp: 97 6 °F (36 4 °C)   SpO2: 98%        Physical Exam  Vitals and nursing note reviewed  Constitutional:       General: He is not in acute distress  Appearance: Normal appearance  He is not ill-appearing  HENT:      Head: Normocephalic and atraumatic  Nose: No congestion or rhinorrhea  Mouth/Throat:      Mouth: Mucous membranes are dry  Eyes:      General: No scleral icterus  Extraocular Movements: Extraocular movements intact  Conjunctiva/sclera: Conjunctivae normal       Pupils: Pupils are equal, round, and reactive to light  Cardiovascular:      Rate and Rhythm: Normal rate and regular rhythm  Pulses: Normal pulses  Heart sounds: Normal heart sounds  No murmur heard  Pulmonary:      Effort: Pulmonary effort is normal       Breath sounds: Normal breath sounds  No wheezing, rhonchi or rales  Abdominal:      General: Bowel sounds are normal  There is no distension  Palpations: Abdomen is soft  Tenderness: There is no abdominal tenderness  Genitourinary:     Comments: B/L Nephrostomy tubes  draining marco urine from Right  yellow/pink with minimal output from Left    Musculoskeletal:         General: No swelling or signs of injury  Lymphadenopathy:      Cervical: No cervical adenopathy  Skin:     General: Skin is warm and dry  Findings: No erythema  Neurological:      Mental Status: He is alert and oriented to person, place, and time  Mental status is at baseline  Sensory: No sensory deficit  Motor: Weakness present  Gait: Gait abnormal    Psychiatric:         Mood and Affect: Mood normal          Behavior: Behavior normal          Admission Diagnoses:     1  Primary hypertension  Assessment & Plan:  · BP stable  · Reviewed BP log at [facility]- SBP ranging 118-144  · Continue Metoprolol Succ 50mg Daily with hold parameters     Orders:  -     metoprolol succinate (TOPROL-XL) 100 mg 24 hr tablet; Take 1 tablet (100 mg total) by mouth daily    2  Prostate cancer metastatic to bone [facility])  Assessment & Plan:  · S/p IR guided left iliac biopsy 8/2 with confirmed result  · CT chest showed  lung metastasis   · s/p bilateral percutaneous nephrostomy placement on 7/28 for bilateral hydronephrosis likely from obstruction at [facility] secondary to enlarged/invading prostate  · Continue Casodex 50 mg Daily  · Continue finasteride and tamsulosin  · OP f/u with Urology and heme-onc  Per , patient is being evaluated for home hospice at d/c by [facility]     Orders:  -     tamsulosin (FLOMAX) 0 4 mg; Take 1 capsule (0 4 mg total) by mouth daily with dinner    3  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 [facility])  Assessment & Plan:  · Patient reportedly had gross hematuria x1 month   · Prostate biopsy in the past most recently done 10/2021   · Follows with urologist in [place]   · CT of abdomen and pelvis 7/27/2022 with asymmetrically enlarged prostate gland left side is larger, 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, bilateral hydronephrosis likely from obstruction, no renal stones  · S/p bilateral percutaneous nephrostomy placed 7/28   · No Hematuria noted during short-term rehab  · Continue outpatient follow-up with Urology  · H&H stable     Continue B/L Nephrostomy tube care, daily flush with 10 ml NSS, daily dressing changes  OP f/u with IR for any issues with PCN tubes  Right Nephrostomy drains more than Left - per nursing flushes without difficulty/pain       4  Other constipation  Assessment & Plan:  · Having regular BM's on bowel regimen   · Continue SenoKot S 1 tab Q HS, MiraLax daily and PRN Dulcolax Suppository       5   Generalized weakness  Assessment & Plan:  Multifactorial  Continue PT/OT  Fall Precautions  Ensure adequate nutrition/hydration   Monitor CBC/BMP - has been stable at SNF  Patient states he is feeling stronger, still unsure about going home, wants more PT   following for d/c planning - Per last  note, patient is set for discharge to home 8/31/22 with home health VNA vs Home Hospice- Patient's family is appealing discharge- if patient wins appeal and patient stays at [address] [place] [address], [facility] care will continue to follow for medical management  6  Acute blood loss anemia  Assessment & Plan:  · S/p 3 units PRBC inpatient likely due to hx of prostate ca with mets and gross hematuria upon presentation to ED   · Hgb 8 6- 8 4 at [facility] upon review   · Stable        7  Chronic gout without tophus, unspecified cause, unspecified site  Assessment & Plan:  · Stable   · Denies pain on exam   · Continue allopurinol    Orders:  -     allopurinol (ZYLOPRIM) 300 mg tablet;  Take 1 tablet (300 mg total) by mouth daily       Follow-up Recommendations:     Outpatient Follow up with PCP in the next 2 weeks  [address] [place] [address] PT/OT/SN services     Labs and testing performed during stay:    Collection Date:08/18/2022 30:57  BASIC METABOLIC PNL  GLUCOSE 89 mg/dL 65-99 Final  BUN 15 mg/dL 7-28 Final  CREATININE 0 84 mg/dL 0 53-1 30 Final  SODIUM 138 mmol/L 135-145 Final  POTASSIUM 4 2 mmol/L 3 5-5 2 Final  CHLORIDE 105 mmol/L 100-109 Final  CARBON DIOXIDE 23 mmol/L 23-31 Final  CALCIUM 8 5 mg/dL 8 5-10 1 Final  ANION GAP 10 3-11 Final  eGFRcr 92 >59 Final    CBC NO DIFF  HEMOGLOBIN 8 4 g/dL 12 5-17 0 L Final  HEMATOCRIT 25 2 % 37 0-48 0 L Final  WBC 7 7 thou/cmm 4 0-10 5 Final  RBC 2 86 mill/cmm 4 00-5 40 L Final  PLATELET COUNT 404 thou/cmm 140-350 Final  MPV 10 0 fL 7 5-11 3 Final  MCV 88 fL  Final  MCH 29 3 pg 27 0-36 0 Final  MCHC 33 3 g/dL 32 0-37 0 Final  RDW 16 6 % 12 0-16 0 H Final    Discharge Medications: See discharge medication list which was reviewed and signed  Current Outpatient Medications:     allopurinol (ZYLOPRIM) 300 mg tablet, Take 1 tablet (300 mg total) by mouth daily, Disp: 30 tablet, Rfl: 0    metoprolol succinate (TOPROL-XL) 100 mg 24 hr tablet, Take 1 tablet (100 mg total) by mouth daily, Disp: 30 tablet, Rfl: 0    tamsulosin (FLOMAX) 0 4 mg, Take 1 capsule (0 4 mg total) by mouth daily with dinner, Disp: 30 capsule, Rfl: 0    bicalutamide (CASODEX) 50 mg tablet, Take 1 tablet (50 mg total) by mouth daily for 30 doses, Disp: 30 tablet, Rfl: 0    finasteride (PROSCAR) 5 mg tablet, Take 1 tablet (5 mg total) by mouth daily, Disp: 30 tablet, Rfl: 0    fluticasone (FLONASE) 50 mcg/act nasal spray, 1 spray into each nostril daily, Disp: 1 Bottle, Rfl: 0    pantoprazole (PROTONIX) 40 mg tablet, Take 1 tablet (40 mg total) by mouth daily, Disp: 30 tablet, Rfl: 0    sodium chloride, PF, 0 9 %, 10 mL by Intracatheter route daily Intracatheter flushing daily  May substitute prefilled syringe with normal saline 10 mL vials, 10 mL syringes, and 18 g blunt needles, Disp: 900 mL, Rfl: 1    sodium chloride, PF, 0 9 %, 10 mL by Intracatheter route daily Intracatheter flushing daily  May substitute prefilled syringe with normal saline 10 mL vials, 10 mL syringes, and 18 g blunt needles, Disp: 900 mL, Rfl: 2     Discussion with patient/family and further instructions:  -Fall precautions  -Aspiration precautions  -Bleeding precautions  -Monitor for signs/symptoms of infection  -Medication list was reviewed and updated     Status at time of discharge: Stable    Plan discussed with Dr Margret Baez noted agreement with assessment and plan  Billing based on time  Time spent on unit, 40 minutes  Time spent counseling pt on debility/condition, 30 minutes  Please note:  Voice-recognition software may have been used in the preparation of this document    Occasional wrong word or "sound-alike" substitutions may have occurred due to the inherent limitations of voice recognition software  Interpretation should be guided by context          [facility] TRICIA Morgan  8/29/2022

## 2022-08-30 VITALS
OXYGEN SATURATION: 98 % | HEART RATE: 75 BPM | SYSTOLIC BLOOD PRESSURE: 118 MMHG | TEMPERATURE: 97.6 F | DIASTOLIC BLOOD PRESSURE: 74 MMHG | BODY MASS INDEX: 27.55 KG/M2 | RESPIRATION RATE: 18 BRPM | WEIGHT: 192 LBS

## 2022-08-30 PROBLEM — N99.528: Status: ACTIVE | Noted: 2022-08-30

## 2022-08-30 PROBLEM — M1A.9XX0 CHRONIC GOUT WITHOUT TOPHUS: Status: ACTIVE | Noted: 2022-08-30

## 2022-08-30 RX ORDER — TAMSULOSIN HYDROCHLORIDE 0.4 MG/1
0.4 CAPSULE ORAL
Qty: 30 CAPSULE | Refills: 0 | Status: SHIPPED | OUTPATIENT
Start: 2022-08-30

## 2022-08-30 RX ORDER — METOPROLOL SUCCINATE 100 MG/1
100 TABLET, EXTENDED RELEASE ORAL DAILY
Qty: 30 TABLET | Refills: 0 | Status: SHIPPED | OUTPATIENT
Start: 2022-08-30

## 2022-08-30 RX ORDER — ALLOPURINOL 300 MG/1
300 TABLET ORAL DAILY
Qty: 30 TABLET | Refills: 0 | Status: SHIPPED | OUTPATIENT
Start: 2022-08-30

## 2022-08-30 NOTE — ASSESSMENT & PLAN NOTE
Multifactorial  Continue PT/OT  Fall Precautions  Ensure adequate nutrition/hydration   Monitor CBC/BMP - has been stable at SNF  Patient states he is feeling stronger, still unsure about going home, wants more PT   following for d/c planning - Per last  note, patient is set for discharge to home 8/31/22 with home health VNA vs Home Hospice- Patient's family is appealing discharge- if patient wins appeal and patient stays at [facility] [address] [place] [address] care will continue to follow for medical management

## 2022-08-30 NOTE — ASSESSMENT & PLAN NOTE
· S/p 3 units PRBC inpatient likely due to hx of prostate ca with mets and gross hematuria upon presentation to ED   · Hgb 8 6- 8 4 at SNF upon review   · Stable

## 2022-08-30 NOTE — ASSESSMENT & PLAN NOTE
· Patient reportedly had gross hematuria x1 month   · Prostate biopsy in the past most recently done 10/2021   · Follows with urologist in [place]   · CT of abdomen and pelvis 7/27/2022 with asymmetrically enlarged prostate gland left side is larger, 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, bilateral hydronephrosis likely from obstruction, no renal stones  · S/p bilateral percutaneous nephrostomy placed 7/28   · No Hematuria noted during short-term rehab  · Continue outpatient follow-up with Urology  · H&H stable     Continue B/L Nephrostomy tube care, daily flush with 10 ml NSS, daily dressing changes  OP f/u with IR for any issues with PCN tubes  Right Nephrostomy drains more than Left - per nursing flushes without difficulty/pain

## 2022-08-30 NOTE — ASSESSMENT & PLAN NOTE
· BP stable  · Reviewed BP log at [facility]- SBP ranging 118-144  · Continue Metoprolol Succ 50mg Daily with hold parameters

## 2022-08-30 NOTE — ASSESSMENT & PLAN NOTE
· S/p IR guided left iliac biopsy 8/2 with confirmed result  · CT chest showed  lung metastasis   · s/p bilateral percutaneous nephrostomy placement on 7/28 for bilateral hydronephrosis likely from obstruction at [facility] secondary to enlarged/invading prostate  · Continue Casodex 50 mg Daily  · Continue finasteride and tamsulosin  · OP f/u with Urology and heme-onc      Per , patient is being evaluated for home hospice at d/c by [facility]

## 2022-09-02 ENCOUNTER — TRANSITIONAL CARE MANAGEMENT (OUTPATIENT)
Dept: FAMILY MEDICINE CLINIC | Facility: CLINIC | Age: 72
End: 2022-09-02

## 2022-09-03 ENCOUNTER — HOSPITAL ENCOUNTER (EMERGENCY)
Facility: HOSPITAL | Age: 72
Discharge: HOME | End: 2022-09-03
Attending: EMERGENCY MEDICINE
Payer: MEDICARE

## 2022-09-03 VITALS
OXYGEN SATURATION: 100 % | RESPIRATION RATE: 16 BRPM | DIASTOLIC BLOOD PRESSURE: 58 MMHG | WEIGHT: 193 LBS | HEIGHT: 70 IN | HEART RATE: 72 BPM | SYSTOLIC BLOOD PRESSURE: 118 MMHG | BODY MASS INDEX: 27.63 KG/M2 | TEMPERATURE: 98.7 F

## 2022-09-03 DIAGNOSIS — R31.9 HEMATURIA: Primary | ICD-10-CM

## 2022-09-03 DIAGNOSIS — C61 PROSTATE CANCER (HCC): ICD-10-CM

## 2022-09-03 LAB
ANION GAP SERPL CALCULATED.3IONS-SCNC: 8 MMOL/L (ref 4–13)
BASOPHILS # BLD AUTO: 0.03 THOUSANDS/ΜL (ref 0–0.1)
BASOPHILS NFR BLD AUTO: 0 % (ref 0–1)
BUN SERPL-MCNC: 16 MG/DL (ref 5–25)
CALCIUM SERPL-MCNC: 8.7 MG/DL (ref 8.3–10.1)
CHLORIDE SERPL-SCNC: 100 MMOL/L (ref 96–108)
CO2 SERPL-SCNC: 25 MMOL/L (ref 21–32)
CREAT SERPL-MCNC: 0.82 MG/DL (ref 0.6–1.3)
EOSINOPHIL # BLD AUTO: 0.19 THOUSAND/ΜL (ref 0–0.61)
EOSINOPHIL NFR BLD AUTO: 2 % (ref 0–6)
ERYTHROCYTE [DISTWIDTH] IN BLOOD BY AUTOMATED COUNT: 16.9 % (ref 11.6–15.1)
GFR SERPL CREATININE-BSD FRML MDRD: 87 ML/MIN/1.73SQ M
GLUCOSE SERPL-MCNC: 105 MG/DL (ref 65–140)
HCT VFR BLD AUTO: 29.4 % (ref 36.5–49.3)
HGB BLD-MCNC: 9.2 G/DL (ref 12–17)
IMM GRANULOCYTES # BLD AUTO: 0.02 THOUSAND/UL (ref 0–0.2)
IMM GRANULOCYTES NFR BLD AUTO: 0 % (ref 0–2)
LYMPHOCYTES # BLD AUTO: 2.19 THOUSANDS/ΜL (ref 0.6–4.47)
LYMPHOCYTES NFR BLD AUTO: 24 % (ref 14–44)
MCH RBC QN AUTO: 28.5 PG (ref 26.8–34.3)
MCHC RBC AUTO-ENTMCNC: 31.3 G/DL (ref 31.4–37.4)
MCV RBC AUTO: 91 FL (ref 82–98)
MONOCYTES # BLD AUTO: 0.95 THOUSAND/ΜL (ref 0.17–1.22)
MONOCYTES NFR BLD AUTO: 10 % (ref 4–12)
NEUTROPHILS # BLD AUTO: 5.91 THOUSANDS/ΜL (ref 1.85–7.62)
NEUTS SEG NFR BLD AUTO: 64 % (ref 43–75)
NRBC BLD AUTO-RTO: 0 /100 WBCS
PLATELET # BLD AUTO: 232 THOUSANDS/UL (ref 149–390)
PMV BLD AUTO: 11.9 FL (ref 8.9–12.7)
POTASSIUM SERPL-SCNC: 5.7 MMOL/L (ref 3.5–5.3)
RBC # BLD AUTO: 3.23 MILLION/UL (ref 3.88–5.62)
SODIUM SERPL-SCNC: 133 MMOL/L (ref 135–147)
WBC # BLD AUTO: 9.29 THOUSAND/UL (ref 4.31–10.16)

## 2022-09-03 PROCEDURE — 99283 EMERGENCY DEPT VISIT LOW MDM: CPT

## 2022-09-03 PROCEDURE — 99214 OFFICE O/P EST MOD 30 MIN: CPT | Performed by: NURSE PRACTITIONER

## 2022-09-03 PROCEDURE — 80048 BASIC METABOLIC PNL TOTAL CA: CPT | Performed by: NURSE PRACTITIONER

## 2022-09-03 PROCEDURE — 85025 COMPLETE CBC W/AUTO DIFF WBC: CPT | Performed by: NURSE PRACTITIONER

## 2022-09-03 PROCEDURE — 36415 COLL VENOUS BLD VENIPUNCTURE: CPT | Performed by: NURSE PRACTITIONER

## 2022-09-03 NOTE — ED NOTES
Urine bag drained per request ~250mL of dark yellow, clear urine noted  Pt and family state this urine looks "normal" for patient        Yola Silvestre RN  09/03/22 [id number]

## 2022-09-03 NOTE — ED NOTES
RN to bedside to collect ordered labs - pt's family requesting use of US/"site-rite" machine as patient is "a terrible stick, he has no veins " RN Jose to bedside, 20G placed and labs collected, line infiltrated on flush  PIV removed  Labs sent and RN to notify provider of need for USIV pending results and future orders  Warm blanket and pillow provided, HOB lowered for comfort  Pt and family deny further needs, will continue to monitor       Trinidad Escobar RN  09/03/22 [id number]

## 2022-09-04 NOTE — ED PROVIDER NOTES
History  Chief Complaint   Patient presents with    Blood in Urine     Blood in urine when urinating through the penis, has a catheter for stage 4 prostate cancer  Denies pain at this time  This is a 78-year-old male patient accompanied by of caregiver with reports of penile hematuria  The patient has a known history of prostate cancer  The patient has bilateral nephrostomy tubes in place that are pain free and draining clear yellow urine  No sediment noted in the bag  Up till now during this week the patient had been urinating maybe once a day through his penis but there had not been blood until today  No reported fever chills  Blood in Urine  Irritative symptoms do not include frequency  Pertinent negatives include no abdominal pain, dysuria, fever, flank pain or vomiting  Prior to Admission Medications   Prescriptions Last Dose Informant Patient Reported? Taking?   allopurinol (ZYLOPRIM) 300 mg tablet   No No   Sig: Take 1 tablet (300 mg total) by mouth daily   bicalutamide (CASODEX) 50 mg tablet   No No   Sig: Take 1 tablet (50 mg total) by mouth daily for 30 doses   finasteride (PROSCAR) 5 mg tablet   No No   Sig: Take 1 tablet (5 mg total) by mouth daily   fluticasone (FLONASE) 50 mcg/act nasal spray   No No   Si spray into each nostril daily   metoprolol succinate (TOPROL-XL) 100 mg 24 hr tablet   No No   Sig: Take 1 tablet (100 mg total) by mouth daily   pantoprazole (PROTONIX) 40 mg tablet   No No   Sig: Take 1 tablet (40 mg total) by mouth daily   sodium chloride, PF, 0 9 %   No No   Sig: 10 mL by Intracatheter route daily Intracatheter flushing daily  May substitute prefilled syringe with normal saline 10 mL vials, 10 mL syringes, and 18 g blunt needles   sodium chloride, PF, 0 9 %   No No   Sig: 10 mL by Intracatheter route daily Intracatheter flushing daily   May substitute prefilled syringe with normal saline 10 mL vials, 10 mL syringes, and 18 g blunt needles   tamsulosin (FLOMAX) 0 4 mg   No No   Sig: Take 1 capsule (0 4 mg total) by mouth daily with dinner      Facility-Administered Medications: None       Past Medical History:   Diagnosis Date    Acute respiratory failure with hypoxia (HCC) secondary to presumed COVID-19 infection 5/2/2020    MICA (acute kidney injury) ([facility] [address] ) 7/28/2022    Gout     Hypertension     Sinus congestion 7/7/2020       Past Surgical History:   Procedure Laterality Date    IR BIOPSY BONE  8/2/2022    IR NEPHROSTOMY TUBE PLACEMENT  7/28/2022       Family History   Problem Relation Age of Onset    Diabetes Mother     Hypertension Mother     Mental illness Mother     Heart attack Son     Heart attack Daughter      I have reviewed and agree with the history as documented  E-Cigarette/Vaping    E-Cigarette Use Never User      E-Cigarette/Vaping Substances     Social History     Tobacco Use    Smoking status: Former Smoker     Types: Cigarettes    Smokeless tobacco: Never Used    Tobacco comment: quit 24yrs ago   Vaping Use    Vaping Use: Never used   Substance Use Topics    Alcohol use: Yes    Drug use: Never       Review of Systems   Constitutional: Negative for diaphoresis, fatigue and fever  HENT: Negative for congestion, ear pain, nosebleeds and sore throat  Eyes: Negative for photophobia, pain, discharge and visual disturbance  Respiratory: Negative for cough, choking, chest tightness, shortness of breath and wheezing  Cardiovascular: Negative for chest pain and palpitations  Gastrointestinal: Negative for abdominal distention, abdominal pain, diarrhea and vomiting  Genitourinary: Positive for hematuria  Negative for dysuria, flank pain and frequency  Musculoskeletal: Negative for back pain, gait problem and joint swelling  Skin: Negative for color change and rash  Neurological: Negative for dizziness, syncope and headaches  Psychiatric/Behavioral: Negative for behavioral problems and confusion   The patient is not nervous/anxious  All other systems reviewed and are negative  Physical Exam  Physical Exam  Vitals and nursing note reviewed  Constitutional:       General: He is not in acute distress  Appearance: He is well-developed  HENT:      Head: Normocephalic and atraumatic  Eyes:      General:         Right eye: No discharge  Left eye: No discharge  Conjunctiva/sclera: Conjunctivae normal    Cardiovascular:      Rate and Rhythm: Normal rate  Pulmonary:      Effort: Pulmonary effort is normal  No respiratory distress  Abdominal:      General: There is no distension  Tenderness: There is no guarding  Musculoskeletal:         General: No deformity  Cervical back: Normal range of motion and neck supple  Skin:     General: Skin is warm and dry  Neurological:      Mental Status: He is alert and oriented to person, place, and time        Coordination: Coordination normal          Vital Signs  ED Triage Vitals [09/03/22 1415]   Temperature Pulse Respirations Blood Pressure SpO2   98 7 °F (37 1 °C) 72 16 118/58 100 %      Temp Source Heart Rate Source Patient Position - Orthostatic VS BP Location FiO2 (%)   Oral Monitor Sitting Left arm --      Pain Score       --           Vitals:    09/03/22 1415   BP: 118/58   Pulse: 72   Patient Position - Orthostatic VS: Sitting         Visual Acuity      ED Medications  Medications - No data to display    Diagnostic Studies  Results Reviewed     Procedure Component Value Units Date/Time    Basic metabolic panel [[id number]]  (Abnormal) Collected: 09/03/22 1549    Lab Status: Final result Specimen: Blood from Arm, Left Updated: 09/03/22 1623     Sodium 133 mmol/L      Potassium 5 7 mmol/L      Chloride 100 mmol/L      CO2 25 mmol/L      ANION GAP 8 mmol/L      BUN 16 mg/dL      Creatinine 0 82 mg/dL      Glucose 105 mg/dL      Calcium 8 7 mg/dL      eGFR 87 ml/min/1 73sq m     Narrative:      [place] guidelines for Chronic Kidney Disease (CKD):     Stage 1 with normal or high GFR (GFR > 90 mL/min/1 73 square meters)    Stage 2 Mild CKD (GFR = 60-89 mL/min/1 73 square meters)    Stage 3A Moderate CKD (GFR = 45-59 mL/min/1 73 square meters)    Stage 3B Moderate CKD (GFR = 30-44 mL/min/1 73 square meters)    Stage 4 Severe CKD (GFR = 15-29 mL/min/1 73 square meters)    Stage 5 End Stage CKD (GFR <15 mL/min/1 73 square meters)  Note: GFR calculation is accurate only with a steady state creatinine    CBC and differential [[id number]]  (Abnormal) Collected: 09/03/22 1549    Lab Status: Final result Specimen: Blood from Arm, Left Updated: 09/03/22 1600     WBC 9 29 Thousand/uL      RBC 3 23 Million/uL      Hemoglobin 9 2 g/dL      Hematocrit 29 4 %      MCV 91 fL      MCH 28 5 pg      MCHC 31 3 g/dL      RDW 16 9 %      MPV 11 9 fL      Platelets 963 Thousands/uL      nRBC 0 /100 WBCs      Neutrophils Relative 64 %      Immat GRANS % 0 %      Lymphocytes Relative 24 %      Monocytes Relative 10 %      Eosinophils Relative 2 %      Basophils Relative 0 %      Neutrophils Absolute 5 91 Thousands/µL      Immature Grans Absolute 0 02 Thousand/uL      Lymphocytes Absolute 2 19 Thousands/µL      Monocytes Absolute 0 95 Thousand/µL      Eosinophils Absolute 0 19 Thousand/µL      Basophils Absolute 0 03 Thousands/µL                  No orders to display              Procedures  Procedures         ED Course                               SBIRT 20yo+    Flowsheet Row Most Recent Value   SBIRT (23 yo +)    In order to provide better care to our patients, we are screening all of our patients for alcohol and drug use  Would it be okay to ask you these screening questions? Yes Filed at: 09/03/2022 1600   Initial Alcohol Screen: US AUDIT-C     1  How often do you have a drink containing alcohol? 1 Filed at: 09/03/2022 1600   2  How many drinks containing alcohol do you have on a typical day you are drinking? 1 Filed at: 09/03/2022 1600   3b  FEMALE Any Age, or MALE 65+: How often do you have 4 or more drinks on one occassion? 0 Filed at: 09/03/2022 1600   Audit-C Score 2 Filed at: 09/03/2022 1600   NEGRA: How many times in the past year have you    Used an illegal drug or used a prescription medication for non-medical reasons? Never Filed at: 09/03/2022 1600                    [facility]  Number of Diagnoses or Management Options  Hematuria: new and requires workup  Prostate cancer [facility]): new and requires workup  Diagnosis management comments: Bilateral nephrostomy tubes appear to be functioning appropriately  They are in place because of his known prostate cancer  Recommend that they call Urology on Tuesday  Amount and/or Complexity of Data Reviewed  Independent visualization of images, tracings, or specimens: yes    Patient Progress  Patient progress: stable      Disposition  Final diagnoses:   Hematuria   Prostate cancer ([facility] [place] [address] )     Time reflects when diagnosis was documented in both MDM as applicable and the Disposition within this note     Time User Action Codes Description Comment    9/3/2022  4:39 PM Maris Roof [R31 9] Hematuria     9/3/2022  4:40 PM Dannielleario Graver Add [C61] Prostate cancer [facility])       ED Disposition     ED Disposition   Discharge    Condition   Stable    Date/Time   Sat Sep 3, 2022  4:39 PM    [address] discharge to home/self care                 Follow-up Information     Follow up With Specialties Details Why Contact Info Additional [address] For Urology [facility] Urology   [address] [facility] [id number] [phone] [facility], [address]  [address] [facility], [facility], [place], [address] [place] [address] Drive          Discharge Medication List as of 9/3/2022  4:41 PM      CONTINUE these medications which have NOT CHANGED    Details   allopurinol (ZYLOPRIM) 300 mg tablet Take 1 tablet (300 mg total) by mouth daily, Starting Tue 8/30/2022, Normal      bicalutamide (CASODEX) 50 mg tablet Take 1 tablet (50 mg total) by mouth daily for 30 doses, Starting Fri 8/5/2022, Until Sun 9/4/2022, Normal      finasteride (PROSCAR) 5 mg tablet Take 1 tablet (5 mg total) by mouth daily, Starting Fri 8/12/2022, Until Sun 9/11/2022, Normal      fluticasone (FLONASE) 50 mcg/act nasal spray 1 spray into each nostril daily, Starting Tue 7/7/2020, Normal      metoprolol succinate (TOPROL-XL) 100 mg 24 hr tablet Take 1 tablet (100 mg total) by mouth daily, Starting Tue 8/30/2022, Normal      pantoprazole (PROTONIX) 40 mg tablet Take 1 tablet (40 mg total) by mouth daily, Starting Sat 5/9/2020, Until Mon 6/8/2020, Normal      !! sodium chloride, PF, 0 9 % 10 mL by Intracatheter route daily Intracatheter flushing daily  May substitute prefilled syringe with normal saline 10 mL vials, 10 mL syringes, and 18 g blunt needles, Starting Thu 7/28/2022, Until Wed 10/26/2022, Normal      !! sodium chloride, PF, 0 9 % 10 mL by Intracatheter route daily Intracatheter flushing daily  May substitute prefilled syringe with normal saline 10 mL vials, 10 mL syringes, and 18 g blunt needles, Starting Thu 7/28/2022, Until Wed 10/26/2022, Normal      tamsulosin (FLOMAX) 0 4 mg Take 1 capsule (0 4 mg total) by mouth daily with dinner, Starting Tue 8/30/2022, Normal       !! - Potential duplicate medications found  Please discuss with provider  No discharge procedures on file      PDMP Review     None          ED Provider  Electronically Signed by           TRICIA Hernandez  09/03/22 1763

## 2022-10-05 ENCOUNTER — APPOINTMENT (OUTPATIENT)
Dept: HEART AND VASCULAR | Facility: CLINIC | Age: 72
End: 2022-10-05

## 2022-10-10 ENCOUNTER — TELEPHONE (OUTPATIENT)
Dept: HEMATOLOGY ONCOLOGY | Facility: CLINIC | Age: 72
End: 2022-10-10

## 2022-10-10 NOTE — TELEPHONE ENCOUNTER
Scheduling Appointment SEND TO [facility]    Who Is Calling to Schedule  [facility]    Doctor Dr Nanette Ayala   Date and Time 10/13 2:40PM   Reason for scheduling appointment Missed last visits   Patient verbalized understanding   Yes

## 2022-10-10 NOTE — TELEPHONE ENCOUNTER
CALL RETURN FORM   Reason for patient call? Patients daughter Bigg Yost would like to know if there are transportation options that can help physically get patient in and out of car  Patient has limited mobility  Patient's primary oncologist? Dr Casie Javier   Name of person the patient was calling for? Care Coordinators   Any additional information to add, if applicable? Patient is scheduled 10/13 2:40PM [facility] with Dr Casie Javier   Informed patient that the message will be forwarded to the team and someone will get back to them as soon as possible    Did you relay this information to the patient?  Yes

## 2022-10-11 ENCOUNTER — PATIENT OUTREACH (OUTPATIENT)
Dept: HEMATOLOGY ONCOLOGY | Facility: CLINIC | Age: 72
End: 2022-10-11

## 2022-10-11 NOTE — TELEPHONE ENCOUNTER
I called daughter and LM letting her know that [facility] transportation cannot touch patients for liability reasons  I gave her my direct line if she has further questions

## 2022-10-13 ENCOUNTER — TELEMEDICINE (OUTPATIENT)
Dept: FAMILY MEDICINE CLINIC | Facility: CLINIC | Age: 72
End: 2022-10-13
Payer: MEDICARE

## 2022-10-13 ENCOUNTER — PATIENT OUTREACH (OUTPATIENT)
Dept: FAMILY MEDICINE CLINIC | Facility: CLINIC | Age: 72
End: 2022-10-13

## 2022-10-13 VITALS — HEIGHT: 70 IN | BODY MASS INDEX: 28.63 KG/M2 | WEIGHT: 200 LBS

## 2022-10-13 DIAGNOSIS — Z00.00 MEDICARE ANNUAL WELLNESS VISIT, SUBSEQUENT: Primary | ICD-10-CM

## 2022-10-13 DIAGNOSIS — C79.51 PROSTATE CANCER METASTATIC TO BONE (HCC): ICD-10-CM

## 2022-10-13 DIAGNOSIS — C61 PROSTATE CANCER METASTATIC TO BONE (HCC): ICD-10-CM

## 2022-10-13 DIAGNOSIS — Z78.9 NEED FOR FOLLOW-UP BY SOCIAL WORKER: ICD-10-CM

## 2022-10-13 DIAGNOSIS — R53.1 GENERALIZED WEAKNESS: ICD-10-CM

## 2022-10-13 PROCEDURE — G0439 PPPS, SUBSEQ VISIT: HCPCS | Performed by: NURSE PRACTITIONER

## 2022-10-13 RX ORDER — FUROSEMIDE 20 MG/1
TABLET ORAL
COMMUNITY
Start: 2022-08-02

## 2022-10-13 RX ORDER — SIMVASTATIN 20 MG
TABLET ORAL
COMMUNITY
Start: 2022-08-02

## 2022-10-13 NOTE — PATIENT INSTRUCTIONS
Medicare Preventive Visit Patient Instructions  Thank you for completing your Welcome to Medicare Visit or Medicare Annual Wellness Visit today  Your next wellness visit will be due in one year (10/14/2023)  The screening/preventive services that you may require over the next 5-10 years are detailed below  Some tests may not apply to you based off risk factors and/or age  Screening tests ordered at today's visit but not completed yet may show as past due  Also, please note that scanned in results may not display below  Preventive Screenings:  Service Recommendations Previous Testing/Comments   Colorectal Cancer Screening  · Colonoscopy    · Fecal Occult Blood Test (FOBT)/Fecal Immunochemical Test (FIT)  · Fecal DNA/Cologuard Test  · Flexible Sigmoidoscopy Age: 39-70 years old   Colonoscopy: every 10 years (May be performed more frequently if at higher risk)  OR  FOBT/FIT: every 1 year  OR  Cologuard: every 3 years  OR  Sigmoidoscopy: every 5 years  Screening may be recommended earlier than age 39 if at higher risk for colorectal cancer  Also, an individualized decision between you and your healthcare provider will decide whether screening between the ages of 74-80 would be appropriate   Colonoscopy: Not on file  FOBT/FIT: Not on file  Cologuard: Not on file  Sigmoidoscopy: Not on file          Prostate Cancer Screening Individualized decision between patient and health care provider in men between ages of 53-78   Medicare will cover every 12 months beginning on the day after your 50th birthday PSA: 135 7 ng/mL     History Prostate Cancer     Hepatitis C Screening Once for adults born between 1945 and 1965  More frequently in patients at high risk for Hepatitis C Hep C Antibody: 06/11/2020    Screening Current   Diabetes Screening 1-2 times per year if you're at risk for diabetes or have pre-diabetes Fasting glucose: 84 mg/dL (7/28/2022)  A1C: No results in last 5 years (No results in last 5 years)  Screening Current   Cholesterol Screening Once every 5 years if you don't have a lipid disorder  May order more often based on risk factors  Lipid panel: 06/11/2020  Screening Current      Other Preventive Screenings Covered by Medicare:  1  Abdominal Aortic Aneurysm (AAA) Screening: covered once if your at risk  You're considered to be at risk if you have a family history of AAA or a male between the age of 73-68 who smoking at least 100 cigarettes in your lifetime  2  Lung Cancer Screening: covers low dose CT scan once per year if you meet all of the following conditions: (1) Age 50-69; (2) No signs or symptoms of lung cancer; (3) Current smoker or have quit smoking within the last 15 years; (4) You have a tobacco smoking history of at least 20 pack years (packs per day x number of years you smoked); (5) You get a written order from a healthcare provider  3  Glaucoma Screening: covered annually if you're considered high risk: (1) You have diabetes OR (2) Family history of glaucoma OR (3)  aged 48 and older OR (3)  American aged 72 and older  3  Osteoporosis Screening: covered every 2 years if you meet one of the following conditions: (1) Have a vertebral abnormality; (2) On glucocorticoid therapy for more than 3 months; (3) Have primary hyperparathyroidism; (4) On osteoporosis medications and need to assess response to drug therapy  5  HIV Screening: covered annually if you're between the age of 12-76  Also covered annually if you are younger than 13 and older than 72 with risk factors for HIV infection  For pregnant patients, it is covered up to 3 times per pregnancy      Immunizations:  Immunization Recommendations   Influenza Vaccine Annual influenza vaccination during flu season is recommended for all persons aged >= 6 months who do not have contraindications   Pneumococcal Vaccine   * Pneumococcal conjugate vaccine = PCV13 (Prevnar 13), PCV15 (Vaxneuvance), PCV20 (Prevnar 20)  * Pneumococcal polysaccharide vaccine = PPSV23 (Pneumovax) Adults 2364 years old: 1-3 doses may be recommended based on certain risk factors  Adults 72 years old: 1-2 doses may be recommended based off what pneumonia vaccine you previously received   Hepatitis B Vaccine 3 dose series if at intermediate or high risk (ex: diabetes, end stage renal disease, liver disease)   Tetanus (Td) Vaccine - COST NOT COVERED BY MEDICARE PART B Following completion of primary series, a booster dose should be given every 10 years to maintain immunity against tetanus  Td may also be given as tetanus wound prophylaxis  Tdap Vaccine - COST NOT COVERED BY MEDICARE PART B Recommended at least once for all adults  For pregnant patients, recommended with each pregnancy  Shingles Vaccine (Shingrix) - COST NOT COVERED BY MEDICARE PART B  2 shot series recommended in those aged 48 and above     Health Maintenance Due:      Topic Date Due   • Colorectal Cancer Screening  Never done   • Hepatitis C Screening  Completed     Immunizations Due:      Topic Date Due   • Pneumococcal Vaccine: 65+ Years (2 - PPSV23 or PCV20) 07/07/2021   • COVID-19 Vaccine (2 - Pfizer risk series) 08/31/2022   • Influenza Vaccine (1) Never done     Advance Directives   What are advance directives? Advance directives are legal documents that state your wishes and plans for medical care  These plans are made ahead of time in case you lose your ability to make decisions for yourself  Advance directives can apply to any medical decision, such as the treatments you want, and if you want to donate organs  What are the types of advance directives? There are many types of advance directives, and each state has rules about how to use them  You may choose a combination of any of the following:  · Living will: This is a written record of the treatment you want  You can also choose which treatments you do not want, which to limit, and which to stop at a certain time   This includes surgery, medicine, IV fluid, and tube feedings  · Durable power of  for healthcare [place] [facility]): This is a written record that states who you want to make healthcare choices for you when you are unable to make them for yourself  This person, called a proxy, is usually a family member or a friend  You may choose more than 1 proxy  · Do not resuscitate (DNR) order:  A DNR order is used in case your heart stops beating or you stop breathing  It is a request not to have certain forms of treatment, such as CPR  A DNR order may be included in other types of advance directives  · Medical directive: This covers the care that you want if you are in a coma, near death, or unable to make decisions for yourself  You can list the treatments you want for each condition  Treatment may include pain medicine, surgery, blood transfusions, dialysis, IV or tube feedings, and a ventilator (breathing machine)  · Values history: This document has questions about your views, beliefs, and how you feel and think about life  This information can help others choose the care that you would choose  Why are advance directives important? An advance directive helps you control your care  Although spoken wishes may be used, it is better to have your wishes written down  Spoken wishes can be misunderstood, or not followed  Treatments may be given even if you do not want them  An advance directive may make it easier for your family to make difficult choices about your care  Weight Management   Why it is important to manage your weight:  Being overweight increases your risk of health conditions such as heart disease, high blood pressure, type 2 diabetes, and certain types of cancer  It can also increase your risk for osteoarthritis, sleep apnea, and other respiratory problems  Aim for a slow, steady weight loss  Even a small amount of weight loss can lower your risk of health problems    How to lose weight safely:  A safe and healthy way to lose weight is to eat fewer calories and get regular exercise  You can lose up about 1 pound a week by decreasing the number of calories you eat by 500 calories each day  Healthy meal plan for weight management:  A healthy meal plan includes a variety of foods, contains fewer calories, and helps you stay healthy  A healthy meal plan includes the following:  · Eat whole-grain foods more often  A healthy meal plan should contain fiber  Fiber is the part of grains, fruits, and vegetables that is not broken down by your body  Whole-grain foods are healthy and provide extra fiber in your diet  Some examples of whole-grain foods are whole-wheat breads and pastas, oatmeal, brown rice, and bulgur  · Eat a variety of vegetables every day  Include dark, leafy greens such as spinach, kale, ron greens, and mustard greens  Eat yellow and orange vegetables such as carrots, sweet potatoes, and winter squash  · Eat a variety of fruits every day  Choose fresh or canned fruit (canned in its own juice or light syrup) instead of juice  Fruit juice has very little or no fiber  · Eat low-fat dairy foods  Drink fat-free (skim) milk or 1% milk  Eat fat-free yogurt and low-fat cottage cheese  Try low-fat cheeses such as mozzarella and other reduced-fat cheeses  · Choose meat and other protein foods that are low in fat  Choose beans or other legumes such as split peas or lentils  Choose fish, skinless poultry (chicken or turkey), or lean cuts of red meat (beef or pork)  Before you cook meat or poultry, cut off any visible fat  · Use less fat and oil  Try baking foods instead of frying them  Add less fat, such as margarine, sour cream, regular salad dressing and mayonnaise to foods  Eat fewer high-fat foods  Some examples of high-fat foods include french fries, doughnuts, ice cream, and cakes  · Eat fewer sweets  Limit foods and drinks that are high in sugar   This includes candy, cookies, regular soda, and sweetened drinks  Exercise:  Exercise at least 30 minutes per day on most days of the week  Some examples of exercise include walking, biking, dancing, and swimming  You can also fit in more physical activity by taking the stairs instead of the elevator or parking farther away from stores  Ask your healthcare provider about the best exercise plan for you  © Copyright [employer] 2018 Information is for End User's use only and may not be sold, redistributed or otherwise used for commercial purposes  All illustrations and images included in CareNotes® are the copyrighted property of [employer]  or [facility] Preventive Visit Patient Instructions  Thank you for completing your Welcome to Medicare Visit or Medicare Annual Wellness Visit today  Your next wellness visit will be due in one year (10/14/2023)  The screening/preventive services that you may require over the next 5-10 years are detailed below  Some tests may not apply to you based off risk factors and/or age  Screening tests ordered at today's visit but not completed yet may show as past due  Also, please note that scanned in results may not display below  Preventive Screenings:  Service Recommendations Previous Testing/Comments   Colorectal Cancer Screening  · Colonoscopy    · Fecal Occult Blood Test (FOBT)/Fecal Immunochemical Test (FIT)  · Fecal DNA/Cologuard Test  · Flexible Sigmoidoscopy Age: 39-70 years old   Colonoscopy: every 10 years (May be performed more frequently if at higher risk)  OR  FOBT/FIT: every 1 year  OR  Cologuard: every 3 years  OR  Sigmoidoscopy: every 5 years  Screening may be recommended earlier than age 39 if at higher risk for colorectal cancer  Also, an individualized decision between you and your healthcare provider will decide whether screening between the ages of 74-80 would be appropriate   Colonoscopy: Not on file  FOBT/FIT: Not on file  Cologuard: Not on file  Sigmoidoscopy: Not on file          Prostate Cancer Screening Individualized decision between patient and health care provider in men between ages of 53-78   Medicare will cover every 12 months beginning on the day after your 50th birthday PSA: 135 7 ng/mL     History Prostate Cancer     Hepatitis C Screening Once for adults born between 1945 and 1965  More frequently in patients at high risk for Hepatitis C Hep C Antibody: 06/11/2020    Screening Current   Diabetes Screening 1-2 times per year if you're at risk for diabetes or have pre-diabetes Fasting glucose: 84 mg/dL (7/28/2022)  A1C: No results in last 5 years (No results in last 5 years)  Screening Current   Cholesterol Screening Once every 5 years if you don't have a lipid disorder  May order more often based on risk factors  Lipid panel: 06/11/2020  Screening Current      Other Preventive Screenings Covered by Medicare:  6  Abdominal Aortic Aneurysm (AAA) Screening: covered once if your at risk  You're considered to be at risk if you have a family history of AAA or a male between the age of 73-68 who smoking at least 100 cigarettes in your lifetime  7  Lung Cancer Screening: covers low dose CT scan once per year if you meet all of the following conditions: (1) Age 50-69; (2) No signs or symptoms of lung cancer; (3) Current smoker or have quit smoking within the last 15 years; (4) You have a tobacco smoking history of at least 20 pack years (packs per day x number of years you smoked); (5) You get a written order from a healthcare provider  8  Glaucoma Screening: covered annually if you're considered high risk: (1) You have diabetes OR (2) Family history of glaucoma OR (3)  aged 48 and older OR (3)  American aged 72 and older  5   Osteoporosis Screening: covered every 2 years if you meet one of the following conditions: (1) Have a vertebral abnormality; (2) On glucocorticoid therapy for more than 3 months; (3) Have primary hyperparathyroidism; (4) On osteoporosis medications and need to assess response to drug therapy  10  HIV Screening: covered annually if you're between the age of 12-76  Also covered annually if you are younger than 13 and older than 72 with risk factors for HIV infection  For pregnant patients, it is covered up to 3 times per pregnancy  Immunizations:  Immunization Recommendations   Influenza Vaccine Annual influenza vaccination during flu season is recommended for all persons aged >= 6 months who do not have contraindications   Pneumococcal Vaccine   * Pneumococcal conjugate vaccine = PCV13 (Prevnar 13), PCV15 (Vaxneuvance), PCV20 (Prevnar 20)  * Pneumococcal polysaccharide vaccine = PPSV23 (Pneumovax) Adults 25-60 years old: 1-3 doses may be recommended based on certain risk factors  Adults 72 years old: 1-2 doses may be recommended based off what pneumonia vaccine you previously received   Hepatitis B Vaccine 3 dose series if at intermediate or high risk (ex: diabetes, end stage renal disease, liver disease)   Tetanus (Td) Vaccine - COST NOT COVERED BY MEDICARE PART B Following completion of primary series, a booster dose should be given every 10 years to maintain immunity against tetanus  Td may also be given as tetanus wound prophylaxis  Tdap Vaccine - COST NOT COVERED BY MEDICARE PART B Recommended at least once for all adults  For pregnant patients, recommended with each pregnancy  Shingles Vaccine (Shingrix) - COST NOT COVERED BY MEDICARE PART B  2 shot series recommended in those aged 48 and above     Health Maintenance Due:      Topic Date Due   • Colorectal Cancer Screening  Never done   • Hepatitis C Screening  Completed     Immunizations Due:      Topic Date Due   • Pneumococcal Vaccine: 65+ Years (2 - PPSV23 or PCV20) 07/07/2021   • COVID-19 Vaccine (2 - Pfizer risk series) 08/31/2022   • Influenza Vaccine (1) Never done     Advance Directives   What are advance directives?   Advance directives are legal documents that state your wishes and plans for medical care  These plans are made ahead of time in case you lose your ability to make decisions for yourself  Advance directives can apply to any medical decision, such as the treatments you want, and if you want to donate organs  What are the types of advance directives? There are many types of advance directives, and each state has rules about how to use them  You may choose a combination of any of the following:  · Living will: This is a written record of the treatment you want  You can also choose which treatments you do not want, which to limit, and which to stop at a certain time  This includes surgery, medicine, IV fluid, and tube feedings  · Durable power of  for healthcare [place] [facility]): This is a written record that states who you want to make healthcare choices for you when you are unable to make them for yourself  This person, called a proxy, is usually a family member or a friend  You may choose more than 1 proxy  · Do not resuscitate (DNR) order:  A DNR order is used in case your heart stops beating or you stop breathing  It is a request not to have certain forms of treatment, such as CPR  A DNR order may be included in other types of advance directives  · Medical directive: This covers the care that you want if you are in a coma, near death, or unable to make decisions for yourself  You can list the treatments you want for each condition  Treatment may include pain medicine, surgery, blood transfusions, dialysis, IV or tube feedings, and a ventilator (breathing machine)  · Values history: This document has questions about your views, beliefs, and how you feel and think about life  This information can help others choose the care that you would choose  Why are advance directives important? An advance directive helps you control your care  Although spoken wishes may be used, it is better to have your wishes written down  Spoken wishes can be misunderstood, or not followed  Treatments may be given even if you do not want them  An advance directive may make it easier for your family to make difficult choices about your care  Weight Management   Why it is important to manage your weight:  Being overweight increases your risk of health conditions such as heart disease, high blood pressure, type 2 diabetes, and certain types of cancer  It can also increase your risk for osteoarthritis, sleep apnea, and other respiratory problems  Aim for a slow, steady weight loss  Even a small amount of weight loss can lower your risk of health problems  How to lose weight safely:  A safe and healthy way to lose weight is to eat fewer calories and get regular exercise  You can lose up about 1 pound a week by decreasing the number of calories you eat by 500 calories each day  Healthy meal plan for weight management:  A healthy meal plan includes a variety of foods, contains fewer calories, and helps you stay healthy  A healthy meal plan includes the following:  · Eat whole-grain foods more often  A healthy meal plan should contain fiber  Fiber is the part of grains, fruits, and vegetables that is not broken down by your body  Whole-grain foods are healthy and provide extra fiber in your diet  Some examples of whole-grain foods are whole-wheat breads and pastas, oatmeal, brown rice, and bulgur  · Eat a variety of vegetables every day  Include dark, leafy greens such as spinach, kale, ron greens, and mustard greens  Eat yellow and orange vegetables such as carrots, sweet potatoes, and winter squash  · Eat a variety of fruits every day  Choose fresh or canned fruit (canned in its own juice or light syrup) instead of juice  Fruit juice has very little or no fiber  · Eat low-fat dairy foods  Drink fat-free (skim) milk or 1% milk  Eat fat-free yogurt and low-fat cottage cheese  Try low-fat cheeses such as mozzarella and other reduced-fat cheeses    · Choose meat and other protein foods that are low in fat  Choose beans or other legumes such as split peas or lentils  Choose fish, skinless poultry (chicken or turkey), or lean cuts of red meat (beef or pork)  Before you cook meat or poultry, cut off any visible fat  · Use less fat and oil  Try baking foods instead of frying them  Add less fat, such as margarine, sour cream, regular salad dressing and mayonnaise to foods  Eat fewer high-fat foods  Some examples of high-fat foods include french fries, doughnuts, ice cream, and cakes  · Eat fewer sweets  Limit foods and drinks that are high in sugar  This includes candy, cookies, regular soda, and sweetened drinks  Exercise:  Exercise at least 30 minutes per day on most days of the week  Some examples of exercise include walking, biking, dancing, and swimming  You can also fit in more physical activity by taking the stairs instead of the elevator or parking farther away from stores  Ask your healthcare provider about the best exercise plan for you  © Copyright [employer] 2018 Information is for End User's use only and may not be sold, redistributed or otherwise used for commercial purposes   All illustrations and images included in CareNotes® are the copyrighted property of [employer]  or [address] [place] [address] [facility]

## 2022-10-13 NOTE — PROGRESS NOTES
Assessment and Plan:     Problem List Items Addressed This Visit        Musculoskeletal and Integument    Prostate cancer metastatic to bone [facility])    Relevant Orders    Ambulatory Referral to Social Work Care Management Program       Other    Medicare annual wellness visit, subsequent - Primary     Medicare wellness completed, communication with daughter  Patient is currently on hospice has prostate cancer  Did refer to  for healthcare aide  Patient is basically on comfort care  Generalized weakness    Relevant Orders    Ambulatory Referral to Social Work Care Management Program      Other Visit Diagnoses     Need for follow-up by         Relevant Orders    Ambulatory Referral to Social Work Care Management Program        BMI Counseling: Body mass index is 28 7 kg/m²  Follow-up plan was not completed due to patient receiving palliative care  Depression Screening and Follow-up Plan: Patient was screened for depression during today's encounter  They screened negative with a PHQ-2 score of 0  Preventive health issues were discussed with patient, and age appropriate screening tests were ordered as noted in patient's After Visit Summary  Personalized health advice and appropriate referrals for health education or preventive services given if needed, as noted in patient's After Visit Summary       History of Present Illness:     Patient presents for a Medicare Wellness Visit    HPI   Patient Care Team:  Mellissa rodriguez PCP - General (Family Medicine)  Yulisa Osborne DO (Family Medicine)     Review of Systems:     Review of Systems     Problem List:     Patient Active Problem List   Diagnosis   • Shortness of breath   • Prolonged Q-T interval on ECG   • Suspected COVID-19 virus infection   • Hypertension   • Encounter to establish care   • Medicare annual wellness visit, subsequent   • Generalized weakness   • Need for vaccination   • Cough   • Gross hematuria   • Prostate cancer metastatic to bone [facility])   • Acute blood loss anemia   • Right shoulder pain   • Other constipation   • 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 [facility])   • Chronic gout without tophus      Past Medical and Surgical History:     Past Medical History:   Diagnosis Date   • Acute respiratory failure with hypoxia ([facility] [address] ) secondary to presumed COVID-19 infection 5/2/2020   • MICA (acute kidney injury) ([facility] [address] ) 7/28/2022   • Gout    • Hypertension    • Sinus congestion 7/7/2020     Past Surgical History:   Procedure Laterality Date   • IR BIOPSY BONE  8/2/2022   • IR NEPHROSTOMY TUBE PLACEMENT  7/28/2022      Family History:     Family History   Problem Relation Age of Onset   • Diabetes Mother    • Hypertension Mother    • Mental illness Mother    • Heart attack Son    • Heart attack Daughter       Social History:     Social History     Socioeconomic History   • Marital status: Single     Spouse name: None   • Number of children: None   • Years of education: None   • Highest education level: None   Occupational History   • None   Tobacco Use   • Smoking status: Former Smoker     Types: Cigarettes   • Smokeless tobacco: Never Used   • Tobacco comment: quit 24yrs ago   Vaping Use   • Vaping Use: Never used   Substance and Sexual Activity   • Alcohol use:  Yes   • Drug use: Never   • Sexual activity: None   Other Topics Concern   • None   Social History Narrative   • None     Social Determinants of Health     Financial Resource Strain: Not on file   Food Insecurity: Not on file   Transportation Needs: Not on file   Physical Activity: Not on file   Stress: Not on file   Social Connections: Not on file   Intimate Partner Violence: Not on file   Housing Stability: Low Risk    • Unable to Pay for Housing in the Last Year: No   • Number of Places Lived in the Last Year: 2   • Unstable Housing in the Last Year: No      Medications and Allergies:     Current Outpatient Medications   Medication Sig Dispense Refill   • allopurinol (ZYLOPRIM) 300 mg tablet Take 1 tablet (300 mg total) by mouth daily 30 tablet 0   • fluticasone (FLONASE) 50 mcg/act nasal spray 1 spray into each nostril daily 1 Bottle 0   • furosemide (LASIX) 20 mg tablet      • metoprolol succinate (TOPROL-XL) 100 mg 24 hr tablet Take 1 tablet (100 mg total) by mouth daily 30 tablet 0   • simvastatin (ZOCOR) 20 mg tablet      • sodium chloride, PF, 0 9 % 10 mL by Intracatheter route daily Intracatheter flushing daily  May substitute prefilled syringe with normal saline 10 mL vials, 10 mL syringes, and 18 g blunt needles 900 mL 1   • sodium chloride, PF, 0 9 % 10 mL by Intracatheter route daily Intracatheter flushing daily  May substitute prefilled syringe with normal saline 10 mL vials, 10 mL syringes, and 18 g blunt needles 900 mL 2   • tamsulosin (FLOMAX) 0 4 mg Take 1 capsule (0 4 mg total) by mouth daily with dinner 30 capsule 0   • bicalutamide (CASODEX) 50 mg tablet Take 1 tablet (50 mg total) by mouth daily for 30 doses 30 tablet 0   • finasteride (PROSCAR) 5 mg tablet Take 1 tablet (5 mg total) by mouth daily 30 tablet 0   • pantoprazole (PROTONIX) 40 mg tablet Take 1 tablet (40 mg total) by mouth daily 30 tablet 0     No current facility-administered medications for this visit  No Known Allergies   Immunizations:     Immunization History   Administered Date(s) Administered   • COVID-19 Pfizer vac (Burt-sucrose, gray cap) 12 yr+ IM 08/10/2022   • Pneumococcal Conjugate 13-Valent 07/07/2020      Health Maintenance:         Topic Date Due   • Colorectal Cancer Screening  Never done   • Hepatitis C Screening  Completed         Topic Date Due   • Pneumococcal Vaccine: 65+ Years (2 - PPSV23 or PCV20) 07/07/2021   • COVID-19 Vaccine (2 - Pfizer risk series) 08/31/2022   • Influenza Vaccine (1) Never done      Medicare Screening Tests and Risk Assessments:     Rosalba Garcia is here for his Subsequent Wellness visit   Last Medicare Wellness visit information reviewed, patient interviewed and updates made to the record today  Historian  Patient cannot answer questions due to cognitive impairment, intelluctual disability, or expressive limitations  Information provided by: family  Health Risk Assessment:   Patient rates overall health as poor  Patient feels that their physical health rating is much worse  Patient is dissatisfied with their life  Eyesight was rated as same  Hearing was rated as same  Patient feels that their emotional and mental health rating is much worse  Patients states they are never, rarely angry  Patient states they are always unusually tired/fatigued  Pain experienced in the last 7 days has been a lot  Patient's pain rating has been 10/10  Patient states that he has experienced weight loss or gain in last 6 months  Depression Screening:   PHQ-2 Score: 0      Fall Risk Screening: In the past year, patient has experienced: no history of falling in past year      Home Safety:  Patient has trouble with stairs inside or outside of their home  Patient has working smoke alarms and has working carbon monoxide detector  Home safety hazards include: none  Nutrition:   Current diet is Regular  Medications:   Patient is currently taking over-the-counter supplements  OTC medications include: see medication list  Patient is able to manage medications  Activities of Daily Living (ADLs)/Instrumental Activities of Daily Living (IADLs):   Walk and transfer into and out of bed and chair?: No  Dress and groom yourself?: No    Bathe or shower yourself?: No    Feed yourself?  No  Do your laundry/housekeeping?: No  Manage your money, pay your bills and track your expenses?: No  Make your own meals?: No    Do your own shopping?: No    Previous Hospitalizations:   Any hospitalizations or ED visits within the last 12 months?: Yes      Advance Care Planning:   Living will: No      Comments: Jonnathan Greene is MACIEL  Pt is currently in hospice care    PREVENTIVE SCREENINGS      Cardiovascular Screening:    General: Screening Current      Diabetes Screening:     General: Screening Current      Colorectal Cancer Screening:     General: Screening Not Indicated      Prostate Cancer Screening:    General: History Prostate Cancer      Osteoporosis Screening:    General: Screening Not Indicated      Abdominal Aortic Aneurysm (AAA) Screening:    Risk factors include: age between 73-67 yo and tobacco use        Lung Cancer Screening:     General: Screening Not Indicated      Hepatitis C Screening:    General: Screening Current    Screening, Brief Intervention, and Referral to Treatment (SBIRT)    Screening  Typical number of drinks in a day: 0  Typical number of drinks in a week: 0  Interpretation: Low risk drinking behavior      Single Item Drug Screening:  How often have you used an illegal drug (including marijuana) or a prescription medication for non-medical reasons in the past year? never    Single Item Drug Screen Score: 0  Interpretation: Negative screen for possible drug use disorder    Other Counseling Topics:   Pt is on hospice- prostate cancer metasteses    No exam data present     Physical Exam:     Ht 5' 10" (1 778 m)   Wt 90 7 kg (200 lb)   BMI 28 70 kg/m²     Physical Exam     TRICIA Segura

## 2022-10-13 NOTE — PROGRESS NOTES
OPCM  is responding to referral from Provider regarding patient being on hospice care and needing additional help at home with personal care  I reviewed the chart and patient has metastatic prostate cancer  Telephone call placed to patient's daughter, Ramona and I introduced myself and explained my role  She informed me that patient is presently living with her and was residing in [place]  She asked if she could call me back due to being on the phone with hospice  I will gladly provide needed assistance when she returns my call

## 2022-10-13 NOTE — ASSESSMENT & PLAN NOTE
Medicare wellness completed, communication with daughter  Patient is currently on hospice has prostate cancer  Did refer to  for healthcare aide  Patient is basically on comfort care

## 2022-10-13 NOTE — PROGRESS NOTES
Telephone call received from patient's daughter, Ramona and she informed me that the hospice nurse just left her house  She reports that her father is on [facility] and she is looking for more assistance with his personal care  Kelmadonna informed me that the  met with her and she gave her the telephone number for the [employer] and advised her to apply for the waiver  I informed Kathleens that the waiver can take a few months  I also discussed requesting respite care with hospice so that she can get a break  Kelmadonna was informed of respite as well  I spoke to Ramona about private duty care and explained that this is out of pocket  Kelmadonna was not given a list of private duty agencies and requested that I sent a list to her via her e-mail address  I sent a list to her e-mail as requested for her review  Ramona denies having any other needs that I can assist with at this time  I encouraged Ramona to work with the hospice staff for future needs and she agrees and was thankful for my outreach